# Patient Record
Sex: MALE | Race: WHITE | NOT HISPANIC OR LATINO
[De-identification: names, ages, dates, MRNs, and addresses within clinical notes are randomized per-mention and may not be internally consistent; named-entity substitution may affect disease eponyms.]

---

## 2018-07-16 ENCOUNTER — RESULT REVIEW (OUTPATIENT)
Age: 78
End: 2018-07-16

## 2022-11-01 ENCOUNTER — INPATIENT (INPATIENT)
Facility: HOSPITAL | Age: 82
LOS: 5 days | Discharge: HOME HEALTH | DRG: 57 | End: 2022-11-07
Attending: GENERAL ACUTE CARE HOSPITAL | Admitting: GENERAL ACUTE CARE HOSPITAL
Payer: COMMERCIAL

## 2022-11-01 VITALS
OXYGEN SATURATION: 96 % | TEMPERATURE: 98 F | WEIGHT: 175.05 LBS | HEIGHT: 67 IN | SYSTOLIC BLOOD PRESSURE: 121 MMHG | HEART RATE: 74 BPM | RESPIRATION RATE: 18 BRPM | DIASTOLIC BLOOD PRESSURE: 75 MMHG

## 2022-11-01 DIAGNOSIS — Z98.890 OTHER SPECIFIED POSTPROCEDURAL STATES: Chronic | ICD-10-CM

## 2022-11-01 DIAGNOSIS — R13.10 DYSPHAGIA, UNSPECIFIED: ICD-10-CM

## 2022-11-01 LAB
ALBUMIN SERPL ELPH-MCNC: 2.8 G/DL — LOW (ref 3.3–5)
ALP SERPL-CCNC: 97 U/L — SIGNIFICANT CHANGE UP (ref 40–120)
ALT FLD-CCNC: 21 U/L — SIGNIFICANT CHANGE UP (ref 10–45)
ANION GAP SERPL CALC-SCNC: 7 MMOL/L — SIGNIFICANT CHANGE UP (ref 5–17)
AST SERPL-CCNC: 31 U/L — SIGNIFICANT CHANGE UP (ref 10–40)
BASE EXCESS BLDV CALC-SCNC: 6.9 MMOL/L — HIGH (ref -2–3)
BASOPHILS # BLD AUTO: 0.04 K/UL — SIGNIFICANT CHANGE UP (ref 0–0.2)
BASOPHILS NFR BLD AUTO: 0.8 % — SIGNIFICANT CHANGE UP (ref 0–2)
BILIRUB SERPL-MCNC: 1.3 MG/DL — HIGH (ref 0.2–1.2)
BUN SERPL-MCNC: 11 MG/DL — SIGNIFICANT CHANGE UP (ref 7–23)
CA-I SERPL-SCNC: 1.17 MMOL/L — SIGNIFICANT CHANGE UP (ref 1.15–1.33)
CALCIUM SERPL-MCNC: 8.3 MG/DL — LOW (ref 8.4–10.5)
CHLORIDE BLDV-SCNC: 105 MMOL/L — SIGNIFICANT CHANGE UP (ref 96–108)
CHLORIDE SERPL-SCNC: 106 MMOL/L — SIGNIFICANT CHANGE UP (ref 96–108)
CO2 BLDV-SCNC: 35 MMOL/L — HIGH (ref 22–26)
CO2 SERPL-SCNC: 30 MMOL/L — SIGNIFICANT CHANGE UP (ref 22–31)
CREAT SERPL-MCNC: 1.01 MG/DL — SIGNIFICANT CHANGE UP (ref 0.5–1.3)
EGFR: 73 ML/MIN/1.73M2 — SIGNIFICANT CHANGE UP
EOSINOPHIL # BLD AUTO: 0.1 K/UL — SIGNIFICANT CHANGE UP (ref 0–0.5)
EOSINOPHIL NFR BLD AUTO: 1.9 % — SIGNIFICANT CHANGE UP (ref 0–6)
FLUAV AG NPH QL: SIGNIFICANT CHANGE UP
FLUBV AG NPH QL: SIGNIFICANT CHANGE UP
GAS PNL BLDV: 141 MMOL/L — SIGNIFICANT CHANGE UP (ref 136–145)
GAS PNL BLDV: SIGNIFICANT CHANGE UP
GLUCOSE BLDV-MCNC: 99 MG/DL — SIGNIFICANT CHANGE UP (ref 70–99)
GLUCOSE SERPL-MCNC: 108 MG/DL — HIGH (ref 70–99)
HCO3 BLDV-SCNC: 33 MMOL/L — HIGH (ref 22–29)
HCT VFR BLD CALC: 38.2 % — LOW (ref 39–50)
HCT VFR BLDA CALC: 39 % — SIGNIFICANT CHANGE UP (ref 39–51)
HGB BLD CALC-MCNC: 13.1 G/DL — SIGNIFICANT CHANGE UP (ref 12.6–17.4)
HGB BLD-MCNC: 12.9 G/DL — LOW (ref 13–17)
IMM GRANULOCYTES NFR BLD AUTO: 0.2 % — SIGNIFICANT CHANGE UP (ref 0–0.9)
LACTATE BLDV-MCNC: 1.6 MMOL/L — SIGNIFICANT CHANGE UP (ref 0.5–2)
LYMPHOCYTES # BLD AUTO: 1.04 K/UL — SIGNIFICANT CHANGE UP (ref 1–3.3)
LYMPHOCYTES # BLD AUTO: 20.3 % — SIGNIFICANT CHANGE UP (ref 13–44)
MAGNESIUM SERPL-MCNC: 2 MG/DL — SIGNIFICANT CHANGE UP (ref 1.6–2.6)
MCHC RBC-ENTMCNC: 31.5 PG — SIGNIFICANT CHANGE UP (ref 27–34)
MCHC RBC-ENTMCNC: 33.8 GM/DL — SIGNIFICANT CHANGE UP (ref 32–36)
MCV RBC AUTO: 93.2 FL — SIGNIFICANT CHANGE UP (ref 80–100)
MONOCYTES # BLD AUTO: 0.45 K/UL — SIGNIFICANT CHANGE UP (ref 0–0.9)
MONOCYTES NFR BLD AUTO: 8.8 % — SIGNIFICANT CHANGE UP (ref 2–14)
NEUTROPHILS # BLD AUTO: 3.49 K/UL — SIGNIFICANT CHANGE UP (ref 1.8–7.4)
NEUTROPHILS NFR BLD AUTO: 68 % — SIGNIFICANT CHANGE UP (ref 43–77)
NRBC # BLD: 0 /100 WBCS — SIGNIFICANT CHANGE UP (ref 0–0)
NT-PROBNP SERPL-SCNC: 424 PG/ML — HIGH (ref 0–300)
PCO2 BLDV: 52 MMHG — SIGNIFICANT CHANGE UP (ref 42–55)
PH BLDV: 7.41 — SIGNIFICANT CHANGE UP (ref 7.32–7.43)
PLATELET # BLD AUTO: 138 K/UL — LOW (ref 150–400)
PO2 BLDV: 28 MMHG — SIGNIFICANT CHANGE UP (ref 25–45)
POTASSIUM BLDV-SCNC: 2.6 MMOL/L — CRITICAL LOW (ref 3.5–5.1)
POTASSIUM SERPL-MCNC: 2.9 MMOL/L — CRITICAL LOW (ref 3.5–5.3)
POTASSIUM SERPL-SCNC: 2.9 MMOL/L — CRITICAL LOW (ref 3.5–5.3)
PROT SERPL-MCNC: 6.4 G/DL — SIGNIFICANT CHANGE UP (ref 6–8.3)
RBC # BLD: 4.1 M/UL — LOW (ref 4.2–5.8)
RBC # FLD: 15.7 % — HIGH (ref 10.3–14.5)
RSV RNA NPH QL NAA+NON-PROBE: SIGNIFICANT CHANGE UP
SAO2 % BLDV: 45.1 % — LOW (ref 67–88)
SARS-COV-2 RNA SPEC QL NAA+PROBE: SIGNIFICANT CHANGE UP
SODIUM SERPL-SCNC: 143 MMOL/L — SIGNIFICANT CHANGE UP (ref 135–145)
TROPONIN T, HIGH SENSITIVITY RESULT: 30 NG/L — SIGNIFICANT CHANGE UP (ref 0–51)
TROPONIN T, HIGH SENSITIVITY RESULT: 32 NG/L — SIGNIFICANT CHANGE UP (ref 0–51)
TSH SERPL-MCNC: 0.68 UIU/ML — SIGNIFICANT CHANGE UP (ref 0.27–4.2)
WBC # BLD: 5.13 K/UL — SIGNIFICANT CHANGE UP (ref 3.8–10.5)
WBC # FLD AUTO: 5.13 K/UL — SIGNIFICANT CHANGE UP (ref 3.8–10.5)

## 2022-11-01 PROCEDURE — 70450 CT HEAD/BRAIN W/O DYE: CPT | Mod: 26

## 2022-11-01 PROCEDURE — 99285 EMERGENCY DEPT VISIT HI MDM: CPT

## 2022-11-01 PROCEDURE — 71250 CT THORAX DX C-: CPT | Mod: 26,MA

## 2022-11-01 PROCEDURE — 71045 X-RAY EXAM CHEST 1 VIEW: CPT | Mod: 26

## 2022-11-01 PROCEDURE — 93010 ELECTROCARDIOGRAM REPORT: CPT

## 2022-11-01 RX ORDER — ATORVASTATIN CALCIUM 80 MG/1
40 TABLET, FILM COATED ORAL AT BEDTIME
Refills: 0 | Status: DISCONTINUED | OUTPATIENT
Start: 2022-11-01 | End: 2022-11-07

## 2022-11-01 RX ORDER — FLUDROCORTISONE ACETATE 0.1 MG/1
0.1 TABLET ORAL DAILY
Refills: 0 | Status: DISCONTINUED | OUTPATIENT
Start: 2022-11-01 | End: 2022-11-07

## 2022-11-01 RX ORDER — FUROSEMIDE 40 MG
40 TABLET ORAL DAILY
Refills: 0 | Status: DISCONTINUED | OUTPATIENT
Start: 2022-11-01 | End: 2022-11-03

## 2022-11-01 RX ORDER — POTASSIUM CHLORIDE 20 MEQ
10 PACKET (EA) ORAL
Refills: 0 | Status: COMPLETED | OUTPATIENT
Start: 2022-11-01 | End: 2022-11-01

## 2022-11-01 RX ORDER — MIDODRINE HYDROCHLORIDE 2.5 MG/1
5 TABLET ORAL THREE TIMES A DAY
Refills: 0 | Status: DISCONTINUED | OUTPATIENT
Start: 2022-11-01 | End: 2022-11-03

## 2022-11-01 RX ORDER — POTASSIUM CHLORIDE 20 MEQ
40 PACKET (EA) ORAL ONCE
Refills: 0 | Status: COMPLETED | OUTPATIENT
Start: 2022-11-01 | End: 2022-11-01

## 2022-11-01 RX ORDER — HEPARIN SODIUM 5000 [USP'U]/ML
5000 INJECTION INTRAVENOUS; SUBCUTANEOUS EVERY 8 HOURS
Refills: 0 | Status: DISCONTINUED | OUTPATIENT
Start: 2022-11-01 | End: 2022-11-07

## 2022-11-01 RX ORDER — ASPIRIN/CALCIUM CARB/MAGNESIUM 324 MG
81 TABLET ORAL DAILY
Refills: 0 | Status: DISCONTINUED | OUTPATIENT
Start: 2022-11-01 | End: 2022-11-07

## 2022-11-01 RX ORDER — DORZOLAMIDE HYDROCHLORIDE 20 MG/ML
1 SOLUTION/ DROPS OPHTHALMIC THREE TIMES A DAY
Refills: 0 | Status: DISCONTINUED | OUTPATIENT
Start: 2022-11-01 | End: 2022-11-07

## 2022-11-01 RX ORDER — LATANOPROST 0.05 MG/ML
1 SOLUTION/ DROPS OPHTHALMIC; TOPICAL AT BEDTIME
Refills: 0 | Status: DISCONTINUED | OUTPATIENT
Start: 2022-11-01 | End: 2022-11-07

## 2022-11-01 RX ADMIN — Medication 100 MILLIEQUIVALENT(S): at 15:03

## 2022-11-01 RX ADMIN — Medication 100 MILLIEQUIVALENT(S): at 17:26

## 2022-11-01 RX ADMIN — Medication 100 MILLIEQUIVALENT(S): at 16:09

## 2022-11-01 RX ADMIN — ATORVASTATIN CALCIUM 40 MILLIGRAM(S): 80 TABLET, FILM COATED ORAL at 22:10

## 2022-11-01 RX ADMIN — Medication 40 MILLIGRAM(S): at 22:11

## 2022-11-01 RX ADMIN — Medication 40 MILLIEQUIVALENT(S): at 20:13

## 2022-11-01 RX ADMIN — Medication 40 MILLIEQUIVALENT(S): at 15:03

## 2022-11-01 RX ADMIN — HEPARIN SODIUM 5000 UNIT(S): 5000 INJECTION INTRAVENOUS; SUBCUTANEOUS at 22:10

## 2022-11-01 NOTE — ED ADULT TRIAGE NOTE - CHIEF COMPLAINT QUOTE
Pt presents to ED with c/o slurred speech, uneasiness, blurred vision x months.  Pt had recent MRI showed history of TIA's    Pt has been seem by outpatient neuro and pt's daughter would like him to be evaluated.  PT takes daily 81 mg

## 2022-11-01 NOTE — ED PROVIDER NOTE - ATTENDING CONTRIBUTION TO CARE
attending Desiree: 82yM h/o HTN, HLD p/w worsening slurred speech, difficulty swallowing, orthopnea over the past month. Pt had recent outpatient MRI showed history of TIA 1 month ago, supposed to be adhering to thickened liquids diet but does not and daughter reports coughing fits after eating. Also with worsening LE edema b/l and orthopnea. Exam as above. Will obtain ekg, labs, review outpatient MRI, cxr eval for aspiration PNA, pt will require admission

## 2022-11-01 NOTE — H&P ADULT - HISTORY OF PRESENT ILLNESS
83yo M pmhx of HTN, HLD comes to ED w/ slurred speech, difficulty swallowing, orthopnea worsening over the past month. Pt had recent MRI showed history of TIA 1 month prior and was seen by outpatient neurologist Dr. Navya Roblero. Pt's daughter wanted him to be further evaluated due to the worsening symptoms prompting the visit to the ED today. Their symptoms are moderate, constant, non mediating with rest, associated with difficulty swallowing, slurred speech, attention deficit, cognitive slowing, anxiety, blurred vision. Denies chest pain, SOB, fall, trauma, n/v/d, sick contacts, urinary symptoms, stool symptoms. Patient denies hx of CHF however takes lasix regularly for peripheral edema 83yo M pmhx of HTN, HLD comes to ED w/ slurred speech, difficulty swallowing,  worsening over the past month.   overall sx started one month ago and pt was seen by neuro Dr. Navya Roblero..MRI per daughter showed "multiple strokes "   he was seen and worked up by cardio inclduing echo and 24 hr holter but jarquin "was negative "    Denies chest pain, SOB, fall, trauma, n/v/d, sick contacts, urinary symptoms.  reports cough to solids   has difficulty swallowing but Patient denies hx of CHF however takes lasix regularly for peripheral edema?

## 2022-11-01 NOTE — ED PROVIDER NOTE - OBJECTIVE STATEMENT
83yo M pmhx of HTN, HLD comes to ED w/ slurred speech, difficulty swallowing, orthopnea worsening over the past month. Pt had recent MRI showed history of TIA 1 month prior and was seen by outpatient neurologist Dr. Navya Roblero. Pt's daughter wanted him to be further evaluated due to the worsening symptoms prompting the visit to the ED today. Their symptoms are moderate, constant, non mediating with rest, associated with difficulty swallowing, slurred speech, attention deficit, cognitive slowing, anxiety, blurred vision. Denies chest pain, SOB, fall, trauma, n/v/d, sick contacts, urinary symptoms, stool symptoms. Patient denies hx of CHF however takes lasix regularly for peripheral edema.

## 2022-11-01 NOTE — ED PROVIDER NOTE - CLINICAL SUMMARY MEDICAL DECISION MAKING FREE TEXT BOX
Impression: 83yo M pmhx of HTN, HLD comes to ED w/ slurred speech, difficulty swallowing, orthopnea worsening over the past month. Their symptoms of slurred speech, difficulty swallowing, orthopnea and exam findings of b/l lower extremity swelling +1 pitting edema in the setting of recent TIA are concerning for worsening dysarthria from TIA sequela, CHF exacerbation.     Ordered labs, imaging, medications for diagnosis, management, and treatment.

## 2022-11-01 NOTE — ED ADULT NURSE REASSESSMENT NOTE - NS ED NURSE REASSESS COMMENT FT1
Pt A+Ox3, medication infusing, resting comfortably in stretcher. Pt and family at bedside aware of plan of care. Admitted to telemetry for difficulty swallowing and orthopnea; pending admission bed.

## 2022-11-01 NOTE — ED ADULT NURSE NOTE - NSIMPLEMENTINTERV_GEN_ALL_ED
Implemented All Fall Risk Interventions:  [place] to call system. Call bell, personal items and telephone within reach. Instruct patient to call for assistance. Room bathroom lighting operational. Non-slip footwear when patient is off stretcher. Physically safe environment: no spills, clutter or unnecessary equipment. Stretcher in lowest position, wheels locked, appropriate side rails in place. Provide visual cue, wrist band, yellow gown, etc. Monitor gait and stability. Monitor for mental status changes and reorient to person, place, and time. Review medications for side effects contributing to fall risk. Reinforce activity limits and safety measures with patient and family.

## 2022-11-01 NOTE — ED PROVIDER NOTE - NS ED ROS FT
Constitutional: no fevers, chills  HEENT: no cough, rhinorrhea  Cardiac: no chest pain, palpitations  Respiratory: no SOB  GI: no n/v, abd pain, bloody or dark stools  : no dysuria, frequency, or hematuria  MSK: no joint pain  Skin: no rashes  Neuro: difficulty swallowing, slurred speech. no headache, change in vision, focal weakness  Psych: negative

## 2022-11-01 NOTE — ED PROVIDER NOTE - PHYSICAL EXAMINATION
General: non-toxic, NAD  HEENT: NCAT, PERRL  Cardiac: RRR, no murmurs, 2+ peripheral pulses  Chest: CTAB  Abdomen: soft, non-distended, bowel sounds present, no ttp, no rebound or guarding  Extremities: b/l lower extremity swelling +1 pitting edema. no calf tenderness, or leg size discrepancies  Skin: no rashes  Neuro: AAOx4, 5+motor, sensory grossly intact  Psych: mood and affect appropriate

## 2022-11-02 DIAGNOSIS — Z86.79 PERSONAL HISTORY OF OTHER DISEASES OF THE CIRCULATORY SYSTEM: ICD-10-CM

## 2022-11-02 DIAGNOSIS — R53.1 WEAKNESS: ICD-10-CM

## 2022-11-02 DIAGNOSIS — I63.9 CEREBRAL INFARCTION, UNSPECIFIED: ICD-10-CM

## 2022-11-02 LAB
ALBUMIN SERPL ELPH-MCNC: 2.7 G/DL — LOW (ref 3.3–5)
ALP SERPL-CCNC: 88 U/L — SIGNIFICANT CHANGE UP (ref 40–120)
ALT FLD-CCNC: 21 U/L — SIGNIFICANT CHANGE UP (ref 10–45)
ANION GAP SERPL CALC-SCNC: 8 MMOL/L — SIGNIFICANT CHANGE UP (ref 5–17)
ANION GAP SERPL CALC-SCNC: 8 MMOL/L — SIGNIFICANT CHANGE UP (ref 5–17)
AST SERPL-CCNC: 28 U/L — SIGNIFICANT CHANGE UP (ref 10–40)
BASOPHILS # BLD AUTO: 0.04 K/UL — SIGNIFICANT CHANGE UP (ref 0–0.2)
BASOPHILS NFR BLD AUTO: 0.8 % — SIGNIFICANT CHANGE UP (ref 0–2)
BILIRUB SERPL-MCNC: 1.9 MG/DL — HIGH (ref 0.2–1.2)
BUN SERPL-MCNC: 9 MG/DL — SIGNIFICANT CHANGE UP (ref 7–23)
BUN SERPL-MCNC: 9 MG/DL — SIGNIFICANT CHANGE UP (ref 7–23)
CALCIUM SERPL-MCNC: 8.3 MG/DL — LOW (ref 8.4–10.5)
CALCIUM SERPL-MCNC: 8.3 MG/DL — LOW (ref 8.4–10.5)
CHLORIDE SERPL-SCNC: 106 MMOL/L — SIGNIFICANT CHANGE UP (ref 96–108)
CHLORIDE SERPL-SCNC: 107 MMOL/L — SIGNIFICANT CHANGE UP (ref 96–108)
CO2 SERPL-SCNC: 29 MMOL/L — SIGNIFICANT CHANGE UP (ref 22–31)
CO2 SERPL-SCNC: 30 MMOL/L — SIGNIFICANT CHANGE UP (ref 22–31)
CREAT SERPL-MCNC: 0.9 MG/DL — SIGNIFICANT CHANGE UP (ref 0.5–1.3)
CREAT SERPL-MCNC: 0.95 MG/DL — SIGNIFICANT CHANGE UP (ref 0.5–1.3)
EGFR: 79 ML/MIN/1.73M2 — SIGNIFICANT CHANGE UP
EGFR: 84 ML/MIN/1.73M2 — SIGNIFICANT CHANGE UP
EOSINOPHIL # BLD AUTO: 0.25 K/UL — SIGNIFICANT CHANGE UP (ref 0–0.5)
EOSINOPHIL NFR BLD AUTO: 5.1 % — SIGNIFICANT CHANGE UP (ref 0–6)
GLUCOSE SERPL-MCNC: 80 MG/DL — SIGNIFICANT CHANGE UP (ref 70–99)
GLUCOSE SERPL-MCNC: 87 MG/DL — SIGNIFICANT CHANGE UP (ref 70–99)
HCT VFR BLD CALC: 38.8 % — LOW (ref 39–50)
HGB BLD-MCNC: 12.8 G/DL — LOW (ref 13–17)
IMM GRANULOCYTES NFR BLD AUTO: 0.2 % — SIGNIFICANT CHANGE UP (ref 0–0.9)
LYMPHOCYTES # BLD AUTO: 1.31 K/UL — SIGNIFICANT CHANGE UP (ref 1–3.3)
LYMPHOCYTES # BLD AUTO: 26.7 % — SIGNIFICANT CHANGE UP (ref 13–44)
MAGNESIUM SERPL-MCNC: 1.9 MG/DL — SIGNIFICANT CHANGE UP (ref 1.6–2.6)
MAGNESIUM SERPL-MCNC: 1.9 MG/DL — SIGNIFICANT CHANGE UP (ref 1.6–2.6)
MCHC RBC-ENTMCNC: 31.4 PG — SIGNIFICANT CHANGE UP (ref 27–34)
MCHC RBC-ENTMCNC: 33 GM/DL — SIGNIFICANT CHANGE UP (ref 32–36)
MCV RBC AUTO: 95.1 FL — SIGNIFICANT CHANGE UP (ref 80–100)
MONOCYTES # BLD AUTO: 0.54 K/UL — SIGNIFICANT CHANGE UP (ref 0–0.9)
MONOCYTES NFR BLD AUTO: 11 % — SIGNIFICANT CHANGE UP (ref 2–14)
MRSA PCR RESULT.: SIGNIFICANT CHANGE UP
NEUTROPHILS # BLD AUTO: 2.75 K/UL — SIGNIFICANT CHANGE UP (ref 1.8–7.4)
NEUTROPHILS NFR BLD AUTO: 56.2 % — SIGNIFICANT CHANGE UP (ref 43–77)
NRBC # BLD: 0 /100 WBCS — SIGNIFICANT CHANGE UP (ref 0–0)
PHOSPHATE SERPL-MCNC: 1.9 MG/DL — LOW (ref 2.5–4.5)
PLATELET # BLD AUTO: 120 K/UL — LOW (ref 150–400)
POTASSIUM SERPL-MCNC: 3 MMOL/L — LOW (ref 3.5–5.3)
POTASSIUM SERPL-MCNC: 3.4 MMOL/L — LOW (ref 3.5–5.3)
POTASSIUM SERPL-SCNC: 3 MMOL/L — LOW (ref 3.5–5.3)
POTASSIUM SERPL-SCNC: 3.4 MMOL/L — LOW (ref 3.5–5.3)
PROT SERPL-MCNC: 6.4 G/DL — SIGNIFICANT CHANGE UP (ref 6–8.3)
RBC # BLD: 4.08 M/UL — LOW (ref 4.2–5.8)
RBC # FLD: 15.3 % — HIGH (ref 10.3–14.5)
S AUREUS DNA NOSE QL NAA+PROBE: DETECTED
SODIUM SERPL-SCNC: 143 MMOL/L — SIGNIFICANT CHANGE UP (ref 135–145)
SODIUM SERPL-SCNC: 145 MMOL/L — SIGNIFICANT CHANGE UP (ref 135–145)
WBC # BLD: 4.9 K/UL — SIGNIFICANT CHANGE UP (ref 3.8–10.5)
WBC # FLD AUTO: 4.9 K/UL — SIGNIFICANT CHANGE UP (ref 3.8–10.5)

## 2022-11-02 PROCEDURE — 76705 ECHO EXAM OF ABDOMEN: CPT | Mod: 26

## 2022-11-02 PROCEDURE — 93306 TTE W/DOPPLER COMPLETE: CPT | Mod: 26

## 2022-11-02 RX ORDER — LANOLIN ALCOHOL/MO/W.PET/CERES
3 CREAM (GRAM) TOPICAL ONCE
Refills: 0 | Status: COMPLETED | OUTPATIENT
Start: 2022-11-02 | End: 2022-11-02

## 2022-11-02 RX ORDER — THIAMINE MONONITRATE (VIT B1) 100 MG
100 TABLET ORAL DAILY
Refills: 0 | Status: DISCONTINUED | OUTPATIENT
Start: 2022-11-02 | End: 2022-11-07

## 2022-11-02 RX ORDER — POTASSIUM CHLORIDE 20 MEQ
10 PACKET (EA) ORAL
Refills: 0 | Status: COMPLETED | OUTPATIENT
Start: 2022-11-02 | End: 2022-11-02

## 2022-11-02 RX ORDER — POTASSIUM PHOSPHATE, MONOBASIC POTASSIUM PHOSPHATE, DIBASIC 236; 224 MG/ML; MG/ML
30 INJECTION, SOLUTION INTRAVENOUS EVERY 6 HOURS
Refills: 0 | Status: DISCONTINUED | OUTPATIENT
Start: 2022-11-02 | End: 2022-11-02

## 2022-11-02 RX ORDER — CHLORHEXIDINE GLUCONATE 213 G/1000ML
1 SOLUTION TOPICAL
Refills: 0 | Status: DISCONTINUED | OUTPATIENT
Start: 2022-11-02 | End: 2022-11-07

## 2022-11-02 RX ORDER — POTASSIUM PHOSPHATE, MONOBASIC POTASSIUM PHOSPHATE, DIBASIC 236; 224 MG/ML; MG/ML
30 INJECTION, SOLUTION INTRAVENOUS ONCE
Refills: 0 | Status: COMPLETED | OUTPATIENT
Start: 2022-11-02 | End: 2022-11-02

## 2022-11-02 RX ORDER — FOLIC ACID 0.8 MG
1 TABLET ORAL DAILY
Refills: 0 | Status: DISCONTINUED | OUTPATIENT
Start: 2022-11-02 | End: 2022-11-07

## 2022-11-02 RX ORDER — POTASSIUM CHLORIDE 20 MEQ
40 PACKET (EA) ORAL ONCE
Refills: 0 | Status: COMPLETED | OUTPATIENT
Start: 2022-11-02 | End: 2022-11-02

## 2022-11-02 RX ORDER — POTASSIUM PHOSPHATE, MONOBASIC POTASSIUM PHOSPHATE, DIBASIC 236; 224 MG/ML; MG/ML
30 INJECTION, SOLUTION INTRAVENOUS EVERY 6 HOURS
Refills: 0 | Status: COMPLETED | OUTPATIENT
Start: 2022-11-02 | End: 2022-11-03

## 2022-11-02 RX ADMIN — Medication 81 MILLIGRAM(S): at 12:14

## 2022-11-02 RX ADMIN — Medication 100 MILLIEQUIVALENT(S): at 08:27

## 2022-11-02 RX ADMIN — HEPARIN SODIUM 5000 UNIT(S): 5000 INJECTION INTRAVENOUS; SUBCUTANEOUS at 21:48

## 2022-11-02 RX ADMIN — POTASSIUM PHOSPHATE, MONOBASIC POTASSIUM PHOSPHATE, DIBASIC 83.33 MILLIMOLE(S): 236; 224 INJECTION, SOLUTION INTRAVENOUS at 12:15

## 2022-11-02 RX ADMIN — HEPARIN SODIUM 5000 UNIT(S): 5000 INJECTION INTRAVENOUS; SUBCUTANEOUS at 05:56

## 2022-11-02 RX ADMIN — DORZOLAMIDE HYDROCHLORIDE 1 DROP(S): 20 SOLUTION/ DROPS OPHTHALMIC at 05:55

## 2022-11-02 RX ADMIN — DORZOLAMIDE HYDROCHLORIDE 1 DROP(S): 20 SOLUTION/ DROPS OPHTHALMIC at 17:57

## 2022-11-02 RX ADMIN — DORZOLAMIDE HYDROCHLORIDE 1 DROP(S): 20 SOLUTION/ DROPS OPHTHALMIC at 21:48

## 2022-11-02 RX ADMIN — Medication 40 MILLIEQUIVALENT(S): at 12:14

## 2022-11-02 RX ADMIN — LATANOPROST 1 DROP(S): 0.05 SOLUTION/ DROPS OPHTHALMIC; TOPICAL at 22:10

## 2022-11-02 RX ADMIN — Medication 100 MILLIEQUIVALENT(S): at 03:54

## 2022-11-02 RX ADMIN — ATORVASTATIN CALCIUM 40 MILLIGRAM(S): 80 TABLET, FILM COATED ORAL at 21:48

## 2022-11-02 RX ADMIN — Medication 40 MILLIGRAM(S): at 05:56

## 2022-11-02 RX ADMIN — FLUDROCORTISONE ACETATE 0.1 MILLIGRAM(S): 0.1 TABLET ORAL at 05:56

## 2022-11-02 RX ADMIN — Medication 3 MILLIGRAM(S): at 22:10

## 2022-11-02 RX ADMIN — Medication 100 MILLIEQUIVALENT(S): at 01:50

## 2022-11-02 NOTE — CONSULT NOTE ADULT - PROBLEM SELECTOR RECOMMENDATION 2
+ orthostatics  c/w midodrine as ordered   continue to monitor + orthostatics  c/w midodrine and florinef as ordered   continue to monitor

## 2022-11-02 NOTE — PHYSICAL THERAPY INITIAL EVALUATION ADULT - GENERAL OBSERVATIONS, REHAB EVAL
Pt rec'd semisupine in bed, in NAD, +tele, +IV, dtr at bedside. Agreeable to PT. RN cleared pt to be seen.

## 2022-11-02 NOTE — PATIENT PROFILE ADULT - FALL HARM RISK - HARM RISK INTERVENTIONS
Assistance with ambulation/Assistance OOB with selected safe patient handling equipment/Communicate Risk of Fall with Harm to all staff/Discuss with provider need for PT consult/Monitor gait and stability/Provide patient with walking aids - walker, cane, crutches/Reinforce activity limits and safety measures with patient and family/Sit up slowly, dangle for a short time, stand at bedside before walking/Tailored Fall Risk Interventions/Visual Cue: Yellow wristband and red socks/Bed in lowest position, wheels locked, appropriate side rails in place/Call bell, personal items and telephone in reach/Instruct patient to call for assistance before getting out of bed or chair/Non-slip footwear when patient is out of bed/[place] to call system/Physically safe environment - no spills, clutter or unnecessary equipment/Purposeful Proactive Rounding/Room/bathroom lighting operational, light cord in reach

## 2022-11-02 NOTE — PROGRESS NOTE ADULT - SUBJECTIVE AND OBJECTIVE BOX
Date of service: 22 @ 19:44      Patient is a 82y old  Male who presents with a chief complaint of                                                              INTERVAL HPI/OVERNIGHT EVENTS:    REVIEW OF SYSTEMS:     CONSTITUTIONAL: No weakness, fevers or chills  EYES/ENT: No visual changes , no ear ache   NECK: No pain or stiffness  RESPIRATORY: No cough, wheezing,  No shortness of breath  CARDIOVASCULAR: No chest pain or palpitations  GASTROINTESTINAL: No abdominal pain  . No nausea, vomiting, or hematemesis; No diarrhea or constipation. No melena or hematochezia.  GENITOURINARY: No dysuria, frequency or hematuria  NEUROLOGICAL: No numbness or weakness  SKIN: No itching, burning, rashes, or lesions                                                                                                                                                                                                                                                                                 Medications:  MEDICATIONS  (STANDING):  aspirin enteric coated 81 milliGRAM(s) Oral daily  atorvastatin 40 milliGRAM(s) Oral at bedtime  chlorhexidine 2% Cloths 1 Application(s) Topical <User Schedule>  dorzolamide 2% Ophthalmic Solution 1 Drop(s) Both EYES three times a day  fludroCORTISONE 0.1 milliGRAM(s) Oral daily  furosemide   Injectable 40 milliGRAM(s) IV Push daily  heparin   Injectable 5000 Unit(s) SubCutaneous every 8 hours  latanoprost 0.005% Ophthalmic Solution 1 Drop(s) Both EYES at bedtime  midodrine. 5 milliGRAM(s) Oral three times a day    MEDICATIONS  (PRN):       Allergies    No Known Allergies    Intolerances      Vital Signs Last 24 Hrs  T(C): 36.6 (2022 11:13), Max: 36.9 (2022 21:31)  T(F): 97.9 (2022 11:13), Max: 98.4 (2022 21:31)  HR: 70 (2022 16:15) (62 - 75)  BP: 132/73 (2022 18:11) (125/68 - 190/81)  BP(mean): --  RR: 18 (2022 16:15) (16 - 18)  SpO2: 97% (2022 16:15) (94% - 97%)    Parameters below as of 2022 16:15  Patient On (Oxygen Delivery Method): room air      CAPILLARY BLOOD GLUCOSE           @ 07:01  -   @ 07:00  --------------------------------------------------------  IN: 0 mL / OUT: 2200 mL / NET: -2200 mL     @ 07:01  -   @ 19:44  --------------------------------------------------------  IN: 240 mL / OUT: 1250 mL / NET: -1010 mL      Physical Exam:    Daily     Daily Weight in k.3 (2022 08:32)  General: NAD   HEENT:  Nonicteric, PERRLA  CV:  RRR, S1S2   Lungs:  CTA B/L, no wheezes, rales, rhonchi  Abdomen:  Soft, non-tender, no distended, positive BS  Extremities: edema   Neuro:  AAOx3, non-focal, grossly intact                                                                                                                                                                                                                                                                                                LABS:                               12.8   4.90  )-----------( 120      ( 2022 06:44 )             38.8                          143  |  106  |  9   ----------------------------<  80  3.4<L>   |  29  |  0.95    Ca    8.3<L>      2022 06:44  Phos  1.9       Mg     1.9         TPro  6.4  /  Alb  2.7<L>  /  TBili  1.9<H>  /  DBili  x   /  AST  28  /  ALT  21  /  AlkPhos  88                         RADIOLOGY & ADDITIONAL TESTS         I personally reviewed: [  ]EKG   [  ]CXR    [  ] CT      A/P:         Discussed with :     [place] consultants' Notes   Time spent :

## 2022-11-02 NOTE — CONSULT NOTE ADULT - SUBJECTIVE AND OBJECTIVE BOX
CHIEF COMPLAINT:     HISTORY OF PRESENT ILLNESS:  81yo M pmhx of HTN, HLD comes to ED w/ slurred speech, difficulty swallowing, worsening over the past month.   overall sx started one month ago and pt was seen by neuro Dr. Navya Roblero.  MRI per daughter showed "multiple strokes "   he was seen and worked up by cardio including echo and 24 hr holter but jarquin "was negative "   Denies chest pain, SOB, fall, trauma, n/v/d, sick contacts, urinary symptoms.  reports cough to solids   has difficulty swallowing but Patient denies hx of CHF however takes lasix regularly for peripheral edema?    Cardiology consulted for cardiac management.  Received history as stated above from his daughter who is bedside.  Denies chest pain, SOB, palpitations.       PAST MEDICAL & SURGICAL HISTORY:  H/O orthostatic hypotension    CVA (cerebrovascular accident)    History of hip surgery    MEDICATIONS:  aspirin enteric coated 81 milliGRAM(s) Oral daily  furosemide   Injectable 40 milliGRAM(s) IV Push daily  heparin   Injectable 5000 Unit(s) SubCutaneous every 8 hours  midodrine. 5 milliGRAM(s) Oral three times a day    atorvastatin 40 milliGRAM(s) Oral at bedtime  fludroCORTISONE 0.1 milliGRAM(s) Oral daily    chlorhexidine 2% Cloths 1 Application(s) Topical <User Schedule>  dorzolamide 2% Ophthalmic Solution 1 Drop(s) Both EYES three times a day  latanoprost 0.005% Ophthalmic Solution 1 Drop(s) Both EYES at bedtime    FAMILY HISTORY:  No pertinent family history in first degree relatives    SOCIAL HISTORY:    [ ] Non-smoker  [ ] Smoker  [ ] Alcohol    Allergies    No Known Allergies    Intolerances    REVIEW OF SYSTEMS:  CONSTITUTIONAL: No fever, weight loss, + fatigue  EYES: No eye pain, visual disturbances, or discharge  ENMT:  No difficulty hearing, tinnitus, vertigo; No sinus or throat pain  NECK: No pain or stiffness  RESPIRATORY: No cough, wheezing, chills or hemoptysis; No Shortness of Breath  CARDIOVASCULAR: No chest pain, palpitations, passing out, dizziness, or leg swelling  GASTROINTESTINAL: No abdominal or epigastric pain. No nausea, vomiting, or hematemesis; No diarrhea or constipation. No melena or hematochezia.  GENITOURINARY: No dysuria, frequency, hematuria, or incontinence  NEUROLOGICAL: No headaches, memory loss, loss of strength, numbness, or tremors  SKIN: No itching, burning, rashes, or lesions   LYMPH Nodes: No enlarged glands  ENDOCRINE: No heat or cold intolerance; No hair loss  MUSCULOSKELETAL: No joint pain or swelling; No muscle, back, or extremity pain  PSYCHIATRIC: No depression, anxiety, mood swings, or difficulty sleeping  HEME/LYMPH: No easy bruising, or bleeding gums  ALLERY AND IMMUNOLOGIC: No hives or eczema	    [ ] All others negative	  [ ] Unable to obtain    PHYSICAL EXAM:  T(C): 36.6 (11-02-22 @ 05:42), Max: 36.9 (11-01-22 @ 21:31)  HR: 62 (11-02-22 @ 05:42) (62 - 72)  BP: 155/73 (11-02-22 @ 05:42) (125/68 - 197/84)  RR: 18 (11-02-22 @ 05:42) (16 - 18)  SpO2: 95% (11-02-22 @ 05:42) (95% - 98%)  Wt(kg): --  I&O's Summary    01 Nov 2022 07:01  -  02 Nov 2022 07:00  --------------------------------------------------------  IN: 0 mL / OUT: 2200 mL / NET: -2200 mL    02 Nov 2022 07:01  -  02 Nov 2022 13:08  --------------------------------------------------------  IN: 0 mL / OUT: 750 mL / NET: -750 mL    Appearance: NAD	  HEENT:  dry oral mucosa, PERRL, EOMI	  Lymphatic: No lymphadenopathy  Cardiovascular: Normal S1 S2, No JVD, No murmurs, No edema  Respiratory: Decreased BS  Psychiatry: A & O x 3, Mood & affect appropriate  Gastrointestinal: Soft, Non-tender, + BS	  Skin: No rashes, No ecchymoses, No cyanosis	  Neurologic: Non-focal  Extremities: Normal range of motion, No clubbing, cyanosis or edema  Vascular: Peripheral pulses palpable 2+ bilaterally    TELEMETRY: SR 	60-70  ECG:  < from: Transthoracic Echocardiogram (11.02.22 @ 06:26) >  Patient name: JE AKERS  YOB: 1940   Age: 82 (M)   MR#: [id number]  Study Date: 11/2/2022  [facility]imah Vas[facility] RDCS  Study quality: Technically fair  Referring Physician: Pablo Conde MD  Blood Pressure: 155/73 mmHg  Height: 170 cm  Weight: 79 kg  BSA: 1.9 m2  ------------------------------------------------------------------------  PROCEDURE: Transthoracic echocardiogram with 2-D, M-Mode  and complete spectral and color flow Doppler.  INDICATION: Abnormal electrocardiogram (ECG) (EKG) (R94.31)  ------------------------------------------------------------------------  Dimensions:    Normal Values:  LA:     3.9    2.0 - 4.0 cm  Ao:     3.7    2.0 - 3.8 cm  SEPTUM: 1.0    0.6 - 1.2 cm  PWT:0.8    0.6 - 1.1 cm  LVIDd:  3.9    3.0 - 5.6 cm  LVIDs:  2.2    1.8 - 4.0 cm  Derived variables:  LVMI: 55 g/m2  RWT: 0.41  Fractional short: 44 %  EF (Visual Estimate):  %  EF ([place] Rule): 78 %Doppler Peak Velocity (m/sec):  AoV=1.5  ------------------------------------------------------------------------  Observations:  Mitral Valve: Normal mitral valve. Minimal mitral  regurgitation.  Aortic Valve/Aorta: Calcified trileaflet aortic valve with  normal opening. Peak transaortic valve gradient equals 9 mm  Hg. No aortic valve regurgitation seen. Peak left  ventricular outflow tract gradient equals 7 mm Hg, mean  gradient is equal to 4 mm Hg, LVOT velocity time integral  equals 32 cm.  Aortic Root: 3.7 cm.  Ascending Aorta: 3.9 cm.  LVOT diameter: 2 cm.  Left Atrium: Normal left atrium.  LA volume index = 26  cc/m2.  Left Ventricle: Hyperdynamic left ventricular systolic  function. There is a false tendon in the LV cavity (normal  variant). Normal left ventricular internal dimensions and  wall thicknesses. Normal diastolic function.  Right Heart: Normal right atrium. Normal right ventricular  size and function. Normal tricuspid valve. Minimal  tricuspid regurgitation. Pulmonic valve not well  visualized, probably normal. No pulmonic regurgitation.  Pericardium/Pleura: Normal pericardium with no pericardial  effusion.  Hemodynamic: Estimated right atrial pressure equals 3 mmHg.  Inadequate tricuspid regurgitation Doppler envelope  precludes estimation of RVSP.  ------------------------------------------------------------------------  Conclusions:  1. Normal mitral valve. Minimal mitral regurgitation.  2. Calcified trileaflet aortic valve with normal opening.  No aortic valve regurgitation seen.  3. Hyperdynamic left ventricular systolic function. There  is a false tendon in the LV cavity (normal variant).  4. Normal right ventricular size and function.  *** No previous Echo exam.  ------------------------------------------------------------------------  Confirmed on  11/2/2022 - 10:07:37 by Anthony Mosqueda M.D.  ------------------------------------------------------------------------    < end of copied text >  	  RADIOLOGY: < from: CT Head No Cont (11.01.22 @ 18:40) >  ACC: [id number] EXAM:  CT BRAIN                          PROCEDURE DATE:  11/01/2022      INTERPRETATION:  CLINICAL INDICATION: Worsening dysphagia    TECHNIQUE: Noncontrast CT of the head was performed.    Multiple contiguous axial images were acquired from the skull base to the   vertex without the administration of intravenous contrast. Coronal and   sagittal reformations were made.    COMPARISON: None.    FINDINGS:  Mild prominence of the sulci and ventricles are consistent with   age-appropriate volume loss. There is no intraparenchymal hematoma, mass   effect or midline shift. The basal cisterns are patent.  No abnormal   extra-axial fluid collections are present.    Mild mucosal thickening of the bilateral ethmoid and sphenoid sinuses.   The mastoid air cells and middle ear cavities are clear. The intraorbital   compartments are unremarkable.    The soft tissues of the scalp are unremarkable. The calvarium is intact.      IMPRESSION:    No acute hemorrhage or mass effect.    --- End of Report ---    < end of copied text >  < from: CT Chest No Cont (11.01.22 @ 15:34) >  ACC: [id number] EXAM:  CT CHEST                          PROCEDURE DATE:  11/01/2022      INTERPRETATION:  CLINICAL INFORMATION: Worsening orthopnea and dysphagia.    COMPARISON: Chest x-ray 11/1/2022.    CONTRAST/COMPLICATIONS: None.    PROCEDURE:  CT scan of the chest was obtained without intravenous contrast.    FINDINGS:    LYMPH NODES/MEDIASTINUM: No lymphadenopathy. Lower paraesophageal varices.    HEART/VASCULATURE: Aortic and coronary artery calcifications. Enlarged   heart. No pericardial effusion.    AIRWAYS/LUNGS/PLEURA: Clear lungs. No pleural effusions or endobronchial   lesions. Mild air trapping.    UPPER ABDOMEN: Nodular shrunken liver, compatible with cirrhosis. Right   upper pole renal cyst measuring 4 cm. Small volume ascites. Surgical   clips in the right upper quadrant.    BONES/SOFT TISSUES: Degenerative changes.    IMPRESSION:    Clear lungs.    --- End of Report ---    < end of copied text >  < from: Xray Chest 1 View AP/PA (11.01.22 @ 13:30) >  ACC: [id number] EXAM:  XR CHEST AP OR PA 1V                          PROCEDURE DATE:  11/01/2022      INTERPRETATION:  CLINICAL INFORMATION: Chest Pain    EXAM: Frontal view of the chest.    COMPARISON: No similar prior studies available for comparison.    FINDINGS:      The heart size cannot be assessed on this projection.  The lungs are clear.  No pleural effusion or pneumothorax.    IMPRESSION:  Clear lungs.    --- End of Report ---    < end of copied text >    OTHER: 	  	  LABS:	 	    CARDIAC MARKERS: Troponin T, High Sensitivity Result: 30: Specimen not hemolyzed Troponin T, High Sensitivity Result: 32: Specimen not hemolyzed                         12.8   4.90  )-----------( 120      ( 02 Nov 2022 06:44 )             38.8     11-02    143  |  106  |  9   ----------------------------<  80  3.4<L>   |  29  |  0.95    Ca    8.3<L>      02 Nov 2022 06:44  Phos  1.9     11-02  Mg     1.9     11-02    TPro  6.4  /  Alb  2.7<L>  /  TBili  1.9<H>  /  DBili  x   /  AST  28  /  ALT  21  /  AlkPhos  88  11-02    proBNP: Serum Pro-Brain Natriuretic Peptide: 424 pg/mL (11-01 @ 13:47)    Lipid Profile:   HgA1c:   TSH: Thyroid Stimulating Hormone, Serum: 0.68 uIU/mL (11-01 @ 13:47)

## 2022-11-02 NOTE — PHYSICAL THERAPY INITIAL EVALUATION ADULT - ADDITIONAL COMMENTS
Per pt and dtr at bedside, pt lives in a house with wife. Has 5 steps to enter (+BHR) and bedroom/bathroom on main level. Reports being independent with functional mobility/ADLs using cane. Reports he owns RW as well. +drive locally. Reporting blurry vision, however pt also reports this is not new 2/2 he has glaucoma and uses reading and distances glasses.

## 2022-11-02 NOTE — PHYSICAL THERAPY INITIAL EVALUATION ADULT - PERTINENT HX OF CURRENT PROBLEM, REHAB EVAL
81yo M pmhx of HTN, HLD comes to ED w/ slurred speech, difficulty swallowing,  worsening over the past month. Overall, sx started one month ago and pt was seen by neuro Dr. Navya Roblero. MRI per daughter showed "multiple strokes". He was seen and worked up by cardio including echo and 24 hr holter but jarquin "was negative". Denies chest pain, SOB, fall, trauma, n/v/d, sick contacts, urinary symptoms. Reports cough to solids. Has difficulty swallowing but Patient denies hx of CHF however takes lasix regularly for peripheral edema? CXR/CT Chest: clear lungs, CT Head: negative. US Abdomen (RUQ): Heterogeneous liver with coarsened echotexture and nodular contour consistent with cirrhosis.

## 2022-11-03 LAB
A1C WITH ESTIMATED AVERAGE GLUCOSE RESULT: 5.3 % — SIGNIFICANT CHANGE UP (ref 4–5.6)
ANION GAP SERPL CALC-SCNC: 5 MMOL/L — SIGNIFICANT CHANGE UP (ref 5–17)
BUN SERPL-MCNC: 10 MG/DL — SIGNIFICANT CHANGE UP (ref 7–23)
CALCIUM SERPL-MCNC: 8.1 MG/DL — LOW (ref 8.4–10.5)
CHLORIDE SERPL-SCNC: 107 MMOL/L — SIGNIFICANT CHANGE UP (ref 96–108)
CHOLEST SERPL-MCNC: 106 MG/DL — SIGNIFICANT CHANGE UP
CO2 SERPL-SCNC: 31 MMOL/L — SIGNIFICANT CHANGE UP (ref 22–31)
CREAT SERPL-MCNC: 0.89 MG/DL — SIGNIFICANT CHANGE UP (ref 0.5–1.3)
EGFR: 84 ML/MIN/1.73M2 — SIGNIFICANT CHANGE UP
ESTIMATED AVERAGE GLUCOSE: 105 MG/DL — SIGNIFICANT CHANGE UP (ref 68–114)
FOLATE SERPL-MCNC: 6.6 NG/ML — SIGNIFICANT CHANGE UP
GLUCOSE SERPL-MCNC: 85 MG/DL — SIGNIFICANT CHANGE UP (ref 70–99)
HDLC SERPL-MCNC: 46 MG/DL — SIGNIFICANT CHANGE UP
LIPID PNL WITH DIRECT LDL SERPL: 50 MG/DL — SIGNIFICANT CHANGE UP
MAGNESIUM SERPL-MCNC: 1.9 MG/DL — SIGNIFICANT CHANGE UP (ref 1.6–2.6)
NON HDL CHOLESTEROL: 60 MG/DL — SIGNIFICANT CHANGE UP
PHOSPHATE SERPL-MCNC: 4.2 MG/DL — SIGNIFICANT CHANGE UP (ref 2.5–4.5)
POTASSIUM SERPL-MCNC: 3.6 MMOL/L — SIGNIFICANT CHANGE UP (ref 3.5–5.3)
POTASSIUM SERPL-SCNC: 3.6 MMOL/L — SIGNIFICANT CHANGE UP (ref 3.5–5.3)
SODIUM SERPL-SCNC: 143 MMOL/L — SIGNIFICANT CHANGE UP (ref 135–145)
TRIGL SERPL-MCNC: 51 MG/DL — SIGNIFICANT CHANGE UP
TSH SERPL-MCNC: 0.85 UIU/ML — SIGNIFICANT CHANGE UP (ref 0.27–4.2)
VIT B12 SERPL-MCNC: 677 PG/ML — SIGNIFICANT CHANGE UP (ref 232–1245)

## 2022-11-03 RX ORDER — MIDODRINE HYDROCHLORIDE 2.5 MG/1
5 TABLET ORAL THREE TIMES A DAY
Refills: 0 | Status: DISCONTINUED | OUTPATIENT
Start: 2022-11-03 | End: 2022-11-07

## 2022-11-03 RX ORDER — LANOLIN ALCOHOL/MO/W.PET/CERES
3 CREAM (GRAM) TOPICAL ONCE
Refills: 0 | Status: COMPLETED | OUTPATIENT
Start: 2022-11-03 | End: 2022-11-03

## 2022-11-03 RX ORDER — LISINOPRIL 2.5 MG/1
2.5 TABLET ORAL DAILY
Refills: 0 | Status: DISCONTINUED | OUTPATIENT
Start: 2022-11-03 | End: 2022-11-05

## 2022-11-03 RX ADMIN — Medication 1 TABLET(S): at 12:07

## 2022-11-03 RX ADMIN — POTASSIUM PHOSPHATE, MONOBASIC POTASSIUM PHOSPHATE, DIBASIC 83.33 MILLIMOLE(S): 236; 224 INJECTION, SOLUTION INTRAVENOUS at 06:56

## 2022-11-03 RX ADMIN — HEPARIN SODIUM 5000 UNIT(S): 5000 INJECTION INTRAVENOUS; SUBCUTANEOUS at 16:17

## 2022-11-03 RX ADMIN — MIDODRINE HYDROCHLORIDE 5 MILLIGRAM(S): 2.5 TABLET ORAL at 06:55

## 2022-11-03 RX ADMIN — DORZOLAMIDE HYDROCHLORIDE 1 DROP(S): 20 SOLUTION/ DROPS OPHTHALMIC at 06:55

## 2022-11-03 RX ADMIN — DORZOLAMIDE HYDROCHLORIDE 1 DROP(S): 20 SOLUTION/ DROPS OPHTHALMIC at 21:32

## 2022-11-03 RX ADMIN — DORZOLAMIDE HYDROCHLORIDE 1 DROP(S): 20 SOLUTION/ DROPS OPHTHALMIC at 16:17

## 2022-11-03 RX ADMIN — Medication 3 MILLIGRAM(S): at 21:38

## 2022-11-03 RX ADMIN — CHLORHEXIDINE GLUCONATE 1 APPLICATION(S): 213 SOLUTION TOPICAL at 06:56

## 2022-11-03 RX ADMIN — Medication 100 MILLIGRAM(S): at 12:07

## 2022-11-03 RX ADMIN — Medication 40 MILLIGRAM(S): at 06:55

## 2022-11-03 RX ADMIN — ATORVASTATIN CALCIUM 40 MILLIGRAM(S): 80 TABLET, FILM COATED ORAL at 21:31

## 2022-11-03 RX ADMIN — POTASSIUM PHOSPHATE, MONOBASIC POTASSIUM PHOSPHATE, DIBASIC 83.33 MILLIMOLE(S): 236; 224 INJECTION, SOLUTION INTRAVENOUS at 00:56

## 2022-11-03 RX ADMIN — HEPARIN SODIUM 5000 UNIT(S): 5000 INJECTION INTRAVENOUS; SUBCUTANEOUS at 06:55

## 2022-11-03 RX ADMIN — FLUDROCORTISONE ACETATE 0.1 MILLIGRAM(S): 0.1 TABLET ORAL at 06:55

## 2022-11-03 RX ADMIN — LISINOPRIL 2.5 MILLIGRAM(S): 2.5 TABLET ORAL at 17:25

## 2022-11-03 RX ADMIN — LATANOPROST 1 DROP(S): 0.05 SOLUTION/ DROPS OPHTHALMIC; TOPICAL at 21:32

## 2022-11-03 RX ADMIN — Medication 81 MILLIGRAM(S): at 12:07

## 2022-11-03 RX ADMIN — Medication 1 MILLIGRAM(S): at 12:07

## 2022-11-03 RX ADMIN — HEPARIN SODIUM 5000 UNIT(S): 5000 INJECTION INTRAVENOUS; SUBCUTANEOUS at 21:32

## 2022-11-03 NOTE — SWALLOW BEDSIDE ASSESSMENT ADULT - ESOPHAGEAL PHASE
Intermittent eructation s/p trials. Patient endorsed intermittent "regurgitation" episodes over the past year that have worsened.

## 2022-11-03 NOTE — SWALLOW BEDSIDE ASSESSMENT ADULT - COMMENTS
Neurology consult-->Impression: 1) Strokes are chronic, no acute strokes on MRI --> B/L cerebellar can contribute to unsteady gait   2) weakness and mental status changes over the last year likely 2/2 neurodegenerative d/o 3) orthostasis  Cardiology consult for Cardiac management.     11/01 CXR IMPRESSION: Clear lungs.  CT HEAD IMPRESSION: No acute hemorrhage or mass effect.    SWALLOW HISTORY: No reports in SCM or in PACS prior to this admission.

## 2022-11-03 NOTE — SWALLOW BEDSIDE ASSESSMENT ADULT - PHARYNGEAL PHASE
mild latency in the swallow response, palpable hyolaryngeal elevation, multiple swallows (1-3x) indicative of pharyngeal residue. No overt s/s aspiration observed with all trials administered. mild latency in the swallow response, palpable hyolaryngeal elevation, multiple swallows (1-3x) indicative of pharyngeal residue for all trials. No overt s/s aspiration observed with all trials administered.

## 2022-11-03 NOTE — PROGRESS NOTE ADULT - ASSESSMENT
83yo M pmhx of orthostatics Hypotension , Edema , HLD comes to ED w/ slurred speech, difficulty swallowing,  worsening over the past month.   overall sx started one month ago and pt was seen by neuro Dr. Navya Roblero..MRI per daughter showed "multiple strokes "   he was seen and worked up by cardio inclduing echo and 24 hr holter but jarquin "was negative "    Denies chest pain, SOB, fall, trauma, n/v/d, sick contacts, urinary symptoms.  reports cough to solids   has difficulty swallowing but Patient denies hx of CHF however takes lasix regularly for peripheral edema?     - Slurred speech/ dysphagia:  neuro checks   neuro consult noted : likley old infarcts ..no need for further eval   cardio input     - dysphagia : s/s     - cirrhosis on CT :US noted   pt with hx of cirrhosis     - edema:? sec t chf +/- florinef   dc lasix    orthostatic hypotension : midodrine and florinef    HTN urgency : will start small dose of lisinopril   maintain -160

## 2022-11-03 NOTE — PROGRESS NOTE ADULT - SUBJECTIVE AND OBJECTIVE BOX
Date of service: 22 @ 17:46      Patient is a 82y old  Male who presents with a chief complaint of                                                              INTERVAL HPI/OVERNIGHT EVENTS:    REVIEW OF SYSTEMS:     CONSTITUTIONAL: No weakness, fevers or chills  EYES/ENT: No visual changes , no ear ache   NECK: No pain or stiffness  RESPIRATORY: No cough, wheezing,  No shortness of breath  CARDIOVASCULAR: No chest pain or palpitations  GASTROINTESTINAL: No abdominal pain  . No nausea, vomiting, or hematemesis; No diarrhea or constipation. No melena or hematochezia.  GENITOURINARY: No dysuria, frequency or hematuria  NEUROLOGICAL: No numbness or weakness  SKIN: No itching, burning, rashes, or lesions                                                                                                                                                                                                                                                                                 Medications:  MEDICATIONS  (STANDING):  aspirin enteric coated 81 milliGRAM(s) Oral daily  atorvastatin 40 milliGRAM(s) Oral at bedtime  chlorhexidine 2% Cloths 1 Application(s) Topical <User Schedule>  dorzolamide 2% Ophthalmic Solution 1 Drop(s) Both EYES three times a day  fludroCORTISONE 0.1 milliGRAM(s) Oral daily  folic acid 1 milliGRAM(s) Oral daily  heparin   Injectable 5000 Unit(s) SubCutaneous every 8 hours  latanoprost 0.005% Ophthalmic Solution 1 Drop(s) Both EYES at bedtime  lisinopril 2.5 milliGRAM(s) Oral daily  midodrine. 5 milliGRAM(s) Oral three times a day  multivitamin 1 Tablet(s) Oral daily  thiamine 100 milliGRAM(s) Oral daily    MEDICATIONS  (PRN):       Allergies    No Known Allergies    Intolerances      Vital Signs Last 24 Hrs  T(C): 36.6 (2022 16:20), Max: 36.6 (2022 16:20)  T(F): 97.8 (2022 16:20), Max: 97.8 (2022 16:20)  HR: 61 (2022 16:20) (61 - 69)  BP: 195/83 (2022 16:20) (132/73 - 195/83)  BP(mean): --  RR: 18 (2022 16:20) (18 - 18)  SpO2: 96% (2022 16:20) (93% - 99%)    Parameters below as of 2022 11:00  Patient On (Oxygen Delivery Method): room air      CAPILLARY BLOOD GLUCOSE           @ 07:  -   @ 07:00  --------------------------------------------------------  IN: 240 mL / OUT: 1250 mL / NET: -1010 mL     @ 07:  -   @ 17:46  --------------------------------------------------------  IN: 480 mL / OUT: 0 mL / NET: 480 mL      Physical Exam:    Daily     Daily Weight in k.3 (2022 08:41)  General:  Well appearing, NAD, not cachetic  HEENT:  Nonicteric, PERRLA  CV:  RRR, S1S2   Lungs:  CTA B/L, no wheezes, rales, rhonchi  Abdomen:  Soft, non-tender, no distended, positive BS  Extremities:  no edema   Neuro:  AAOx3, non-focal, grossly intact                                                                                                                                                                                                                                                                                                LABS:                               12.8   4.90  )-----------( 120      ( 2022 06:44 )             38.8                          143  |  107  |  10  ----------------------------<  85  3.6   |  31  |  0.89    Ca    8.1<L>      2022 07:40  Phos  4.2       Mg     1.9         TPro  6.4  /  Alb  2.7<L>  /  TBili  1.9<H>  /  DBili  x   /  AST  28  /  ALT  21  /  AlkPhos  88                         RADIOLOGY & ADDITIONAL TESTS         I personally reviewed: [  ]EKG   [  ]CXR    [  ] CT      A/P:         Discussed with :     [place] consultants' Notes   Time spent :

## 2022-11-03 NOTE — SWALLOW BEDSIDE ASSESSMENT ADULT - SLP PERTINENT HISTORY OF CURRENT PROBLEM
81yo M with HTN, HLD, prior strokes, orthostatics hypotension follows with neuro Dr. Navya Roblero p/w generalized weakness, slurred speech, difficulty swallowing,  worsening over the past month but has been worsening for the last year.  started asa 2 weeks ago after MRI showed chronic appearing infarcts/ neuro called for evaluation.  MRI brain 10/5/22: FLAIR changes c/w small vessel disease.  multiple chornic tiny bilateral cerebellar infarcts. small left frontal DVA; chronic post fossa subdural hygroma 6mm

## 2022-11-03 NOTE — PROGRESS NOTE ADULT - ASSESSMENT
83yo M pmhx of HTN, HLD comes to ED w/ slurred speech, difficulty swallowing, worsening over the past month.

## 2022-11-03 NOTE — SWALLOW BEDSIDE ASSESSMENT ADULT - SWALLOW EVAL: PATIENT/FAMILY GOALS STATEMENT
Daughter Viji at the bedside endorsed pt received an MBS in October 2021 at [facility] with recommendations mildly thick liquids, however no aspiration observed. No changes to solids. Pt has had a baseline cough for the past year (not seen at time of evaluation). Daughter Viji at the bedside endorsed pt received an MBS in October 2021 at [facility] with recommendations mildly thick liquids, however no aspiration observed. No recommendations for changes to solids. Daughter stated pt has been drinking 1 drink per day that is thickened, however rest of liquid intake is thin. Pt has had a baseline cough for the past year (not seen at time of evaluation). Daughter Viji at the bedside endorsed pt received an MBS in October 2021 at [facility] with recommendations for soft-bite sized solids and mildly thick liquids, however no aspiration observed on examination as per daughter. Daughter stated pt has been drinking 1 drink per day that is thickened, however rest of liquid intake is thin. Pt has had a baseline cough for the past year (not seen at time of evaluation).

## 2022-11-03 NOTE — PROGRESS NOTE ADULT - SUBJECTIVE AND OBJECTIVE BOX
Subjective: Patient seen and examined. No new events except as noted.     REVIEW OF SYSTEMS:    CONSTITUTIONAL: + weakness, fevers or chills  EYES/ENT: No visual changes;  No vertigo or throat pain   NECK: No pain or stiffness  RESPIRATORY: No cough, wheezing, hemoptysis; No shortness of breath  CARDIOVASCULAR: No chest pain or palpitations  GASTROINTESTINAL: No abdominal or epigastric pain. No nausea, vomiting, or hematemesis; No diarrhea or constipation. No melena or hematochezia.  GENITOURINARY: No dysuria, frequency or hematuria  NEUROLOGICAL: No numbness or weakness  SKIN: No itching, burning, rashes, or lesions   All other review of systems is negative unless indicated above.    MEDICATIONS:  MEDICATIONS  (STANDING):  aspirin enteric coated 81 milliGRAM(s) Oral daily  atorvastatin 40 milliGRAM(s) Oral at bedtime  chlorhexidine 2% Cloths 1 Application(s) Topical <User Schedule>  dorzolamide 2% Ophthalmic Solution 1 Drop(s) Both EYES three times a day  fludroCORTISONE 0.1 milliGRAM(s) Oral daily  folic acid 1 milliGRAM(s) Oral daily  furosemide   Injectable 40 milliGRAM(s) IV Push daily  heparin   Injectable 5000 Unit(s) SubCutaneous every 8 hours  latanoprost 0.005% Ophthalmic Solution 1 Drop(s) Both EYES at bedtime  midodrine. 5 milliGRAM(s) Oral three times a day  multivitamin 1 Tablet(s) Oral daily  thiamine 100 milliGRAM(s) Oral daily    PHYSICAL EXAM:  T(C): 36.3 (11-03-22 @ 11:00), Max: 36.5 (11-02-22 @ 20:42)  HR: 63 (11-03-22 @ 11:00) (61 - 70)  BP: 179/90 (11-03-22 @ 12:04) (132/73 - 194/79)  RR: 18 (11-03-22 @ 11:00) (18 - 18)  SpO2: 95% (11-03-22 @ 11:00) (93% - 99%)  Wt(kg): --  I&O's Summary    02 Nov 2022 07:01  -  03 Nov 2022 07:00  --------------------------------------------------------  IN: 240 mL / OUT: 1250 mL / NET: -1010 mL    Appearance: Normal	  HEENT: Normal oral mucosa, PERRL, EOMI	  Lymphatic: No lymphadenopathy , no edema  Cardiovascular: Normal S1 S2, No JVD, No murmurs , Peripheral pulses palpable 2+ bilaterally  Respiratory: Lungs clear to auscultation, normal effort 	  Gastrointestinal:  Soft, Non-tender, + BS	  Skin: No rashes, No ecchymoses, No cyanosis, warm to touch  Musculoskeletal: Normal range of motion, normal strength  Psychiatry:  Mood & affect appropriate  Ext: No edema    LABS:    CARDIAC MARKERS:                       12.8   4.90  )-----------( 120      ( 02 Nov 2022 06:44 )             38.8     11-03    143  |  107  |  10  ----------------------------<  85  3.6   |  31  |  0.89    Ca    8.1<L>      03 Nov 2022 07:40  Phos  4.2     11-03  Mg     1.9     11-03    TPro  6.4  /  Alb  2.7<L>  /  TBili  1.9<H>  /  DBili  x   /  AST  28  /  ALT  21  /  AlkPhos  88  11-02    proBNP:   Lipid Profile:   HgA1c:   TSH:     TELEMETRY: 	    ECG:  	  RADIOLOGY:   DIAGNOSTIC TESTING:  [ ] Echocardiogram:  [ ]  Catheterization:  [ ] Stress Test:    OTHER: 	 Subjective: Patient seen and examined. No new events except as noted.   Pt feels ok, concerned about it blood pressure.  Discussed with Dr. Manriquez -190 this am but orthostatics still significantly positive.  Would d/c lasix for now, pt is -3.2L with decreased PO intake - S&S today, soft diet - rec IV maintenance fluids if remains with decreased PO intake.      REVIEW OF SYSTEMS:    CONSTITUTIONAL: + weakness, fevers or chills  EYES/ENT: No visual changes;  No vertigo or throat pain   NECK: No pain or stiffness  RESPIRATORY: No cough, wheezing, hemoptysis; No shortness of breath  CARDIOVASCULAR: No chest pain or palpitations  GASTROINTESTINAL: No abdominal or epigastric pain. No nausea, vomiting, or hematemesis; No diarrhea or constipation. No melena or hematochezia.  GENITOURINARY: No dysuria, frequency or hematuria  NEUROLOGICAL: No numbness or weakness  SKIN: No itching, burning, rashes, or lesions   All other review of systems is negative unless indicated above.    MEDICATIONS:  MEDICATIONS  (STANDING):  aspirin enteric coated 81 milliGRAM(s) Oral daily  atorvastatin 40 milliGRAM(s) Oral at bedtime  chlorhexidine 2% Cloths 1 Application(s) Topical <User Schedule>  dorzolamide 2% Ophthalmic Solution 1 Drop(s) Both EYES three times a day  fludroCORTISONE 0.1 milliGRAM(s) Oral daily  folic acid 1 milliGRAM(s) Oral daily  furosemide   Injectable 40 milliGRAM(s) IV Push daily  heparin   Injectable 5000 Unit(s) SubCutaneous every 8 hours  latanoprost 0.005% Ophthalmic Solution 1 Drop(s) Both EYES at bedtime  midodrine. 5 milliGRAM(s) Oral three times a day  multivitamin 1 Tablet(s) Oral daily  thiamine 100 milliGRAM(s) Oral daily    PHYSICAL EXAM:  T(C): 36.3 (11-03-22 @ 11:00), Max: 36.5 (11-02-22 @ 20:42)  HR: 63 (11-03-22 @ 11:00) (61 - 70)  BP: 179/90 (11-03-22 @ 12:04) (132/73 - 194/79)  RR: 18 (11-03-22 @ 11:00) (18 - 18)  SpO2: 95% (11-03-22 @ 11:00) (93% - 99%)  Wt(kg): --  I&O's Summary    02 Nov 2022 07:01  -  03 Nov 2022 07:00  --------------------------------------------------------  IN: 240 mL / OUT: 1250 mL / NET: -1010 mL    Appearance: NAD  HEENT: dry oral mucosa, PERRL, EOMI	  Lymphatic: No lymphadenopathy , no edema  Cardiovascular: Normal S1 S2, No JVD, No murmurs , Peripheral pulses palpable 2+ bilaterally  Respiratory: Lungs clear to auscultation, normal effort 	  Gastrointestinal:  Soft, Non-tender, + BS	  Skin: No rashes, No ecchymoses, No cyanosis, warm to touch  Musculoskeletal: Normal range of motion, normal strength  Psychiatry:  Mood & affect appropriate  Ext: + BL LE edeam    LABS:    CARDIAC MARKERS:                       12.8   4.90  )-----------( 120      ( 02 Nov 2022 06:44 )             38.8     11-03    143  |  107  |  10  ----------------------------<  85  3.6   |  31  |  0.89    Ca    8.1<L>      03 Nov 2022 07:40  Phos  4.2     11-03  Mg     1.9     11-03    TPro  6.4  /  Alb  2.7<L>  /  TBili  1.9<H>  /  DBili  x   /  AST  28  /  ALT  21  /  AlkPhos  88  11-02    proBNP:   Lipid Profile:   HgA1c:   TSH:     TELEMETRY: 	    ECG:  	  RADIOLOGY:   DIAGNOSTIC TESTING:  [ ] Echocardiogram:  [ ]  Catheterization:  [ ] Stress Test:    OTHER:

## 2022-11-03 NOTE — SWALLOW BEDSIDE ASSESSMENT ADULT - ASR SWALLOW ASPIRATION MONITOR
Monitor for s/s aspiration/laryngeal penetration. If noted:  D/C p.o. intake, provide non-oral nutrition/hydration/meds, and contact this service @ [phone]/change of breathing pattern/cough/gurgly voice/fever/pneumonia/throat clearing/upper respiratory infection

## 2022-11-03 NOTE — SWALLOW BEDSIDE ASSESSMENT ADULT - SWALLOW EVAL: DIAGNOSIS
Pt is an 81 y/o male p/w generalized weakness, slurred speech, and difficulty swallowing. MRI brain 10/5/22: FLAIR changes c/w small vessel disease. Multiple chronic tiny bilateral cerebellar infarcts. Pt p/w suspected pharyngo-esophageal dysphagia remarkable for mild latency in the swallow response, palpable hyolaryngeal elevation, multiple swallows (1-3x) indicative of pharyngeal residue, and intermittent eructation s/p trials. No overt s/s aspiration observed with all trials administered. Patient endorsed intermittent "regurgitation" episodes over the past year that have worsened.

## 2022-11-03 NOTE — SWALLOW BEDSIDE ASSESSMENT ADULT - SLP GENERAL OBSERVATIONS
Pt encountered upright in bed, on room air, awake/alert, Aox4. Daughter Viji at the bedside. Hoarse vocal quality that pt endorsed has slightly worsened over the past year ?LPR. Speech grossly intelligible, no dysarthria present. Patient able to follow directives and answer open ended questions for purposes of exam.

## 2022-11-04 PROCEDURE — 74230 X-RAY XM SWLNG FUNCJ C+: CPT | Mod: 26

## 2022-11-04 RX ADMIN — FLUDROCORTISONE ACETATE 0.1 MILLIGRAM(S): 0.1 TABLET ORAL at 05:40

## 2022-11-04 RX ADMIN — LATANOPROST 1 DROP(S): 0.05 SOLUTION/ DROPS OPHTHALMIC; TOPICAL at 22:00

## 2022-11-04 RX ADMIN — MIDODRINE HYDROCHLORIDE 5 MILLIGRAM(S): 2.5 TABLET ORAL at 05:39

## 2022-11-04 RX ADMIN — DORZOLAMIDE HYDROCHLORIDE 1 DROP(S): 20 SOLUTION/ DROPS OPHTHALMIC at 13:12

## 2022-11-04 RX ADMIN — HEPARIN SODIUM 5000 UNIT(S): 5000 INJECTION INTRAVENOUS; SUBCUTANEOUS at 22:01

## 2022-11-04 RX ADMIN — DORZOLAMIDE HYDROCHLORIDE 1 DROP(S): 20 SOLUTION/ DROPS OPHTHALMIC at 05:43

## 2022-11-04 RX ADMIN — DORZOLAMIDE HYDROCHLORIDE 1 DROP(S): 20 SOLUTION/ DROPS OPHTHALMIC at 22:00

## 2022-11-04 RX ADMIN — HEPARIN SODIUM 5000 UNIT(S): 5000 INJECTION INTRAVENOUS; SUBCUTANEOUS at 13:14

## 2022-11-04 RX ADMIN — Medication 100 MILLIGRAM(S): at 12:15

## 2022-11-04 RX ADMIN — LISINOPRIL 2.5 MILLIGRAM(S): 2.5 TABLET ORAL at 05:40

## 2022-11-04 RX ADMIN — CHLORHEXIDINE GLUCONATE 1 APPLICATION(S): 213 SOLUTION TOPICAL at 05:40

## 2022-11-04 RX ADMIN — Medication 81 MILLIGRAM(S): at 12:15

## 2022-11-04 RX ADMIN — Medication 1 MILLIGRAM(S): at 12:15

## 2022-11-04 RX ADMIN — Medication 1 TABLET(S): at 12:15

## 2022-11-04 RX ADMIN — HEPARIN SODIUM 5000 UNIT(S): 5000 INJECTION INTRAVENOUS; SUBCUTANEOUS at 05:40

## 2022-11-04 NOTE — SWALLOW VFSS/MBS ASSESSMENT ADULT - UNSUCCESSFUL STRATEGIES TRIALED DURING PROCEDURE
nonproductive volitional cough following clinician cue productive volitional cough following clinician cue Supraglottic swallow: PAS 8/nonproductive volitional cough following clinician cue

## 2022-11-04 NOTE — PROGRESS NOTE ADULT - SUBJECTIVE AND OBJECTIVE BOX
Subjective: Patient seen and examined. No new events except as noted.     REVIEW OF SYSTEMS:    CONSTITUTIONAL: No weakness, fevers or chills  EYES/ENT: No visual changes;  No vertigo or throat pain   NECK: No pain or stiffness  RESPIRATORY: No cough, wheezing, hemoptysis; No shortness of breath  CARDIOVASCULAR: No chest pain or palpitations  GASTROINTESTINAL: No abdominal or epigastric pain. No nausea, vomiting, or hematemesis; No diarrhea or constipation. No melena or hematochezia.  GENITOURINARY: No dysuria, frequency or hematuria  NEUROLOGICAL: No numbness or weakness  SKIN: No itching, burning, rashes, or lesions   All other review of systems is negative unless indicated above.    MEDICATIONS:  MEDICATIONS  (STANDING):  aspirin enteric coated 81 milliGRAM(s) Oral daily  atorvastatin 40 milliGRAM(s) Oral at bedtime  chlorhexidine 2% Cloths 1 Application(s) Topical <User Schedule>  dorzolamide 2% Ophthalmic Solution 1 Drop(s) Both EYES three times a day  fludroCORTISONE 0.1 milliGRAM(s) Oral daily  folic acid 1 milliGRAM(s) Oral daily  heparin   Injectable 5000 Unit(s) SubCutaneous every 8 hours  latanoprost 0.005% Ophthalmic Solution 1 Drop(s) Both EYES at bedtime  lisinopril 2.5 milliGRAM(s) Oral daily  midodrine. 5 milliGRAM(s) Oral three times a day  multivitamin 1 Tablet(s) Oral daily  thiamine 100 milliGRAM(s) Oral daily      PHYSICAL EXAM:  T(C): 36.3 (11-04-22 @ 04:18), Max: 36.6 (11-03-22 @ 16:20)  HR: 60 (11-04-22 @ 04:18) (60 - 78)  BP: 130/68 (11-04-22 @ 04:18) (129/77 - 195/83)  RR: 18 (11-04-22 @ 04:18) (18 - 18)  SpO2: 94% (11-04-22 @ 04:18) (94% - 96%)  Wt(kg): --  I&O's Summary    03 Nov 2022 07:01  -  04 Nov 2022 07:00  --------------------------------------------------------  IN: 480 mL / OUT: 0 mL / NET: 480 mL          Appearance: Normal	  HEENT:   Normal oral mucosa, PERRL, EOMI	  Lymphatic: No lymphadenopathy , no edema  Cardiovascular: Normal S1 S2, No JVD, No murmurs , Peripheral pulses palpable 2+ bilaterally  Respiratory: Lungs clear to auscultation, normal effort 	  Gastrointestinal:  Soft, Non-tender, + BS	  Skin: No rashes, No ecchymoses, No cyanosis, warm to touch  Musculoskeletal: Normal range of motion, normal strength  Psychiatry:  Mood & affect appropriate  Ext: No edema      LABS:    CARDIAC MARKERS:            11-03    143  |  107  |  10  ----------------------------<  85  3.6   |  31  |  0.89    Ca    8.1<L>      03 Nov 2022 07:40  Phos  4.2     11-03  Mg     1.9     11-03      proBNP:   Lipid Profile:   HgA1c:   TSH:             TELEMETRY: 	    ECG:  	  RADIOLOGY:   DIAGNOSTIC TESTING:  [ ] Echocardiogram:  [ ]  Catheterization:  [ ] Stress Test:    OTHER:

## 2022-11-04 NOTE — SWALLOW VFSS/MBS ASSESSMENT ADULT - ORAL PHASE
Residue in oral cavity/Incomplete tongue to palate contact/Uncontrolled bolus / spillover in justin-pharynx within functional limits/Residue in oral cavity

## 2022-11-04 NOTE — SWALLOW VFSS/MBS ASSESSMENT ADULT - ORAL PHASE COMMENTS
min-moderate residue in oral cavity Mildly thick liquids: premature spillage up to max in valleculae and minimum along aryepiglottic folds Mildly thick liquids: premature spillage up to max in valleculae and minimum along aryepiglottic folds  min oral cavity residue

## 2022-11-04 NOTE — SWALLOW VFSS/MBS ASSESSMENT ADULT - ROSENBEK'S PENETRATION ASPIRATION SCALE
(8) contrast passes glottis, visible subglottic residue remains, absent patient response (aspiration) Compensatory strategies employed, however unsuccessful/(8) contrast passes glottis, visible subglottic residue remains, absent patient response (aspiration)

## 2022-11-04 NOTE — PROGRESS NOTE ADULT - ASSESSMENT
81yo M pmhx of orthostatics Hypotension , Edema , HLD comes to ED w/ slurred speech, difficulty swallowing,  worsening over the past month.   overall sx started one month ago and pt was seen by neuro Dr. Navya Roblero..MRI per daughter showed "multiple strokes "   he was seen and worked up by cardio inclduing echo and 24 hr holter but jarquin "was negative "    Denies chest pain, SOB, fall, trauma, n/v/d, sick contacts, urinary symptoms.  reports cough to solids   has difficulty swallowing but Patient denies hx of CHF however takes lasix regularly for peripheral edema?     - Slurred speech/ dysphagia:  neuro checks   neuro consult noted : likley old infarcts ..no need for further eval   cardio input     - dysphagia : d/w S/S : failed MBS   will re eval       - cirrhosis on CT :US noted   pt with hx of cirrhosis     - edema:? sec t chf +/- florinef   dc lasix    orthostatic hypotension : midodrine and florinef    HTN urgency : will start small dose of lisinopril   maintain -160

## 2022-11-04 NOTE — SWALLOW VFSS/MBS ASSESSMENT ADULT - ASPIRATION DURING THE SWALLOW - SILENT
Material from over laryngeal surface of the epiglottis and over arytenoids; pt inconsistently sensate w/ cough; cued throat clear/cough does not appear to fully eject material from laryngeal vestibule./Trace/Mild Aspiration from material along the laryngeal surface of the epiglottis and over arytenoids; pt inconsistently sensate with cough; w/ volitional and cued cough, material ejected from vocal folds, however, material remains in laryngeal vestibule./Mild/Moderate Aspiration from material along the laryngeal surface of the epiglottis and over arytenoids; pt inconsistently sensate with cough; w/ volitional and cued cough, material ejected from vocal folds and sub-glottic space, however, min material remains in laryngeal vestibule./Mild/Moderate Aspiration from material over laryngeal surface of the epiglottis and over arytenoids; pt inconsistently sensate w/ cough; cued throat clear/cough does not appear to fully eject material from laryngeal vestibule./Trace/Mild

## 2022-11-04 NOTE — SWALLOW VFSS/MBS ASSESSMENT ADULT - DIAGNOSTIC IMPRESSIONS
Pt is an 83yo M with HTN, HLD, prior strokes, orthostatics hypotension p/w generalized weakness, slurred speech, difficulty swallowing, noted w/ chronic b/l cerebellar infarcts. Pt p/w mild oral and severe pharyngeal dysphagia. Swallow characterized by premature spillage of less viscous consistencies, severe pharyngeal residue with reduced clearance despite multiple swallows, with GROSS ASPIRATION with puree and all thickened liquids. Pt remains at high risk of aspiration with P.O. diet. Esophageal Phase: trace retention and air distention noted throughout trials.     Unsuccessful compensatory strategies: Supraglottic swallow unsuccessful in maintaining airway protection w/ noted continued aspiration on remaining material in airway.    Disorders: reduced tongue-palate seal, delayed initiation of swallow, reduced base of tongue retraction, reduced/absent epiglottis inversion, reduced anterior hyo-laryngeal excursion, pharyngeal contractibility. reduced UES dilation and distention. Pt is an 83yo M with HTN, HLD, prior strokes, orthostatics hypotension p/w generalized weakness, slurred speech, difficulty swallowing, noted w/ chronic b/l cerebellar infarcts. Pt p/w mild oral and severe pharyngeal dysphagia. Swallow characterized by premature spillage of less viscous consistencies, severe pharyngeal residue, multiple swallows clears material to min-mod residue, with GROSS ASPIRATION with puree and all thickened liquids. Pt remains at high risk of aspiration with P.O. diet. Esophageal Phase: trace retention and air distention noted throughout trials.     Unsuccessful compensatory strategies: Supraglottic swallow unsuccessful in maintaining airway protection w/ noted continued aspiration on remaining material in airway.    Disorders: reduced tongue-palate seal, delayed initiation of swallow, reduced base of tongue retraction, reduced/absent epiglottis inversion, reduced anterior hyo-laryngeal excursion, pharyngeal contractibility. reduced UES dilation and distention. Pt is an 83yo M with HTN, HLD, prior strokes, orthostatics hypotension p/w generalized weakness, slurred speech, difficulty swallowing, noted w/ chronic b/l cerebellar infarcts. Pt p/w mild oral and severe pharyngeal dysphagia. Swallow characterized by premature spillage of less viscous consistencies, severe pharyngeal residue, multiple swallows clears material to min-mod residue, with GROSS ASPIRATION with puree and all thickened liquids. Pt remains at high risk of aspiration with P.O. diet. Esophageal Phase: trace retention and air distention noted throughout trials.     Unsuccessful compensatory strategies: Supraglottic swallow unsuccessful in maintaining airway protection and resulted in aspiration     Disorders: reduced tongue-palate seal, delayed initiation of swallow, reduced base of tongue retraction, reduced/absent epiglottis inversion, reduced anterior hyo-laryngeal excursion, pharyngeal contractibility. reduced UES dilation and distention.

## 2022-11-04 NOTE — PROGRESS NOTE ADULT - SUBJECTIVE AND OBJECTIVE BOX
Date of service: 11-04-22 @ 15:55      Patient is a 82y old  Male who presents with a chief complaint of                                                              INTERVAL HPI/OVERNIGHT EVENTS:    REVIEW OF SYSTEMS:     CONSTITUTIONAL: No weakness, fevers or chills  EYES/ENT: No visual changes , no ear ache   NECK: No pain or stiffness  RESPIRATORY: No cough, wheezing,  No shortness of breath  CARDIOVASCULAR: No chest pain or palpitations  GASTROINTESTINAL: No abdominal pain  . No nausea, vomiting, or hematemesis; No diarrhea or constipation. No melena or hematochezia.  GENITOURINARY: No dysuria, frequency or hematuria  NEUROLOGICAL: No numbness or weakness  SKIN: No itching, burning, rashes, or lesions                                                                                                                                                                                                                                                                                 Medications:  MEDICATIONS  (STANDING):  aspirin enteric coated 81 milliGRAM(s) Oral daily  atorvastatin 40 milliGRAM(s) Oral at bedtime  chlorhexidine 2% Cloths 1 Application(s) Topical <User Schedule>  dorzolamide 2% Ophthalmic Solution 1 Drop(s) Both EYES three times a day  fludroCORTISONE 0.1 milliGRAM(s) Oral daily  folic acid 1 milliGRAM(s) Oral daily  heparin   Injectable 5000 Unit(s) SubCutaneous every 8 hours  latanoprost 0.005% Ophthalmic Solution 1 Drop(s) Both EYES at bedtime  lisinopril 2.5 milliGRAM(s) Oral daily  midodrine. 5 milliGRAM(s) Oral three times a day  multivitamin 1 Tablet(s) Oral daily  thiamine 100 milliGRAM(s) Oral daily    MEDICATIONS  (PRN):       Allergies    No Known Allergies    Intolerances      Vital Signs Last 24 Hrs  T(C): 36.9 (04 Nov 2022 12:24), Max: 36.9 (04 Nov 2022 12:24)  T(F): 98.4 (04 Nov 2022 12:24), Max: 98.4 (04 Nov 2022 12:24)  HR: 67 (04 Nov 2022 12:24) (60 - 78)  BP: 162/71 (04 Nov 2022 12:24) (129/77 - 195/83)  BP(mean): --  RR: 18 (04 Nov 2022 12:24) (18 - 18)  SpO2: 97% (04 Nov 2022 12:24) (94% - 97%)    Parameters below as of 04 Nov 2022 12:24  Patient On (Oxygen Delivery Method): room air      CAPILLARY BLOOD GLUCOSE          11-03 @ 07:01  -  11-04 @ 07:00  --------------------------------------------------------  IN: 480 mL / OUT: 0 mL / NET: 480 mL    11-04 @ 07:01  -  11-04 @ 15:55  --------------------------------------------------------  IN: 0 mL / OUT: 0 mL / NET: 0 mL      Physical Exam:    Daily     Daily   General:  Well appearing, NAD, not cachetic  HEENT:  Nonicteric, PERRLA  CV:  RRR, S1S2   Lungs:  CTA B/L, no wheezes, rales, rhonchi  Abdomen:  Soft, non-tender, no distended, positive BS  Extremities:  2+ pulses, no c/c, no edema  Skin:  Warm and dry, no rashes  :  No salinas  Neuro:  AAOx3, non-focal, grossly intact                                                                                                                                                                                                                                                                                                LABS:                            11-03    143  |  107  |  10  ----------------------------<  85  3.6   |  31  |  0.89    Ca    8.1<L>      03 Nov 2022 07:40  Phos  4.2     11-03  Mg     1.9     11-03                         RADIOLOGY & ADDITIONAL TESTS         I personally reviewed: [  ]EKG   [  ]CXR    [  ] CT      A/P:         Discussed with :     [place] consultants' Notes   Time spent :

## 2022-11-04 NOTE — SWALLOW VFSS/MBS ASSESSMENT ADULT - ASPIRATION AFTER SWALLOW - SILENT
With repeat dry swallows, material noted to further descend along anterior surface of trachea. from remaining material in laryngeal vestibule and vocal folds With repeat dry swallows, material noted to further descend along anterior surface of trachea. from remaining material in laryngeal vestibule and vocal folds/Mild

## 2022-11-05 DIAGNOSIS — R13.10 DYSPHAGIA, UNSPECIFIED: ICD-10-CM

## 2022-11-05 PROCEDURE — 74230 X-RAY XM SWLNG FUNCJ C+: CPT | Mod: 26

## 2022-11-05 RX ORDER — LISINOPRIL 2.5 MG/1
2.5 TABLET ORAL
Refills: 0 | Status: DISCONTINUED | OUTPATIENT
Start: 2022-11-05 | End: 2022-11-06

## 2022-11-05 RX ORDER — LANOLIN ALCOHOL/MO/W.PET/CERES
3 CREAM (GRAM) TOPICAL ONCE
Refills: 0 | Status: COMPLETED | OUTPATIENT
Start: 2022-11-05 | End: 2022-11-05

## 2022-11-05 RX ADMIN — HEPARIN SODIUM 5000 UNIT(S): 5000 INJECTION INTRAVENOUS; SUBCUTANEOUS at 05:30

## 2022-11-05 RX ADMIN — DORZOLAMIDE HYDROCHLORIDE 1 DROP(S): 20 SOLUTION/ DROPS OPHTHALMIC at 05:29

## 2022-11-05 RX ADMIN — HEPARIN SODIUM 5000 UNIT(S): 5000 INJECTION INTRAVENOUS; SUBCUTANEOUS at 13:38

## 2022-11-05 RX ADMIN — ATORVASTATIN CALCIUM 40 MILLIGRAM(S): 80 TABLET, FILM COATED ORAL at 21:39

## 2022-11-05 RX ADMIN — LATANOPROST 1 DROP(S): 0.05 SOLUTION/ DROPS OPHTHALMIC; TOPICAL at 21:39

## 2022-11-05 RX ADMIN — Medication 3 MILLIGRAM(S): at 23:18

## 2022-11-05 RX ADMIN — HEPARIN SODIUM 5000 UNIT(S): 5000 INJECTION INTRAVENOUS; SUBCUTANEOUS at 21:38

## 2022-11-05 RX ADMIN — DORZOLAMIDE HYDROCHLORIDE 1 DROP(S): 20 SOLUTION/ DROPS OPHTHALMIC at 21:39

## 2022-11-05 RX ADMIN — CHLORHEXIDINE GLUCONATE 1 APPLICATION(S): 213 SOLUTION TOPICAL at 05:30

## 2022-11-05 RX ADMIN — DORZOLAMIDE HYDROCHLORIDE 1 DROP(S): 20 SOLUTION/ DROPS OPHTHALMIC at 13:40

## 2022-11-05 NOTE — SWALLOW VFSS/MBS ASSESSMENT ADULT - POSITIONING
+ Chin tuck + L head turn w/ chin tuck w/ x2 subsequent swallows w/ supraglottic swallow./Lateral + L head turn. + L head turn w/ 2 subsequent swallows. + R head turn w/ 2 subsequent swallows. + R head turn w/ chin tuck. + L head turn w/ chin tuck w/ supraglottic swallow. + L head turn w/ chin tuck w/ 2 subsequent swallows w/ supraglottic swallow./Lateral

## 2022-11-05 NOTE — PROGRESS NOTE ADULT - ASSESSMENT
81yo M pmhx of HTN, HLD comes to ED w/ slurred speech, difficulty swallowing, worsening over the past month.

## 2022-11-05 NOTE — SWALLOW VFSS/MBS ASSESSMENT ADULT - RECOMMENDED CONSISTENCY
Based on results of this exam, primary recommendations for NPO, with non-oral nutrition/hydration/medications and repeat MBS
NPO, with non-oral nutrition/hydration/medications.

## 2022-11-05 NOTE — CONSULT NOTE ADULT - PROBLEM SELECTOR RECOMMENDATION 9
- Diet per SLP  - Can consider scopolamine to aid with secretion management   - Likely neurologic in etiology, recommend MRI   - No further ENT intervention at this time   - Call with questions or concerns
slurred speech, dysphagia    - CTH - negative    - CT A/P - cirrohosis - pending abd U/S  TTE - EF 78%, min MR, false tendon in the LV cavity (normal variant)  monitor on tele   neuro checks  neuro consult

## 2022-11-05 NOTE — SWALLOW VFSS/MBS ASSESSMENT ADULT - UNSUCCESSFUL STRATEGIES TRIALED DURING PROCEDURE
chin tuck + L head turn w/ chin tuck w/ x2 subsequent swallows w/ supraglottic swallow + L head turn. + L head turn w/ 2 subsequent swallows. + R head turn w/ 2 subsequent swallows. + R head turn w/ chin tuck. + L head turn w/ chin tuck w/ supraglottic swallow. + L head turn w/ chin tuck w/ 2 subsequent swallows w/ supraglottic swallow.

## 2022-11-05 NOTE — SWALLOW VFSS/MBS ASSESSMENT ADULT - ASPIRATION DURING THE SWALLOW - SILENT
over the laryngeal surface of the epiglottis/Mild Trace-moderate aspiration inconsistently observed during the swallow over the laryngeal surface of the epiglottis and over the arytenoids. Pt inconsistently sensate w/ cough; cued throat clear/cough does not appear to fully eject material from anterior surface of trachea

## 2022-11-05 NOTE — SWALLOW VFSS/MBS ASSESSMENT ADULT - MODE OF PRESENTATION
spoon spoon/fed by clinician Of note, end of study-->SOME of thin puree trials mislabeled as "THICK - tsp L head turn chin tuck x2 swallows supraglottic", which are all thin puree trials./spoon/fed by clinician

## 2022-11-05 NOTE — SWALLOW VFSS/MBS ASSESSMENT ADULT - SLP GENERAL OBSERVATIONS
Pt received in radiology suite, secure in DANIEL chair. Aox4, pleasant and cooperative, able to follow all complex directives, verbally expresses all wants/needs. Pt reports baseline phlegm w/ coughing up tan colored secretions prior to exam., which was also observed throughout study.
Pt received in radiology suite, secure in DANIEL chair. Aox4, pleasant and cooperative, able to follow all complex directives, verbally expresses all wants/needs. Pt reports baseline phlegm w/ coughing up tan colored secretions prior to exam.

## 2022-11-05 NOTE — SWALLOW VFSS/MBS ASSESSMENT ADULT - SPECIFY REASON(S)
to instrumentally assess swallow safety/function; r/o dysphagia.
to instrumentally assess swallow safety/function; r/o dysphagia.

## 2022-11-05 NOTE — SWALLOW VFSS/MBS ASSESSMENT ADULT - DIAGNOSTIC IMPRESSIONS
Pt is an 83yo M with HTN, HLD, prior strokes, orthostatics hypotension p/w generalized weakness, slurred speech, difficulty swallowing, noted w/ chronic b/l cerebellar infarcts. Pt p/w mild oral and severe pharyngeal dysphagia of unknown etiology given no acute changes. Given conservative textures with implementation of numerous compensatory strategies; airway protection is not maintained resulting in gross aspiration of all textures. Given swallow profile, patient remains at high risk for aspiration PNA. However, patient has been consuming a nonrestrictive diet at home for the past year with no notable complications and no reported acute changes on head/chest imaging. Suggest patient/caregiver/physician discussion regarding goals of care as they relate to nutrition/hydration. Consider (a) NPO with long-term enteral feeding route, which does not eliminate risk for aspiration PNA & will likely negatively impact QOL; versus (b) PO comfort feeding despite high risk for PNA/malnutrition/dehydration.    Disorders: reduced lingual strength, reduced base of tongue retraction, reduced/absent epiglottis inversion, reduced anterior hyo-laryngeal excursion, pharyngeal contractibility, reduced UES dilation and distension

## 2022-11-05 NOTE — SWALLOW VFSS/MBS ASSESSMENT ADULT - ADDITIONAL INFORMATION
+Anterior cervical osteophytes ~C3-7   +epiglottis abutting osteophyte ~C3 on initial dry swallow   +calcifications of anterior tracheal wall  +calcifications of laryngeal structures
+Anterior cervical osteophytes ~C3-7   +epiglottis abutting osteophyte ~C3 on initial dry swallow   +calcifications of anterior tracheal wall  +calcifications of laryngeal structures

## 2022-11-05 NOTE — PROGRESS NOTE ADULT - ASSESSMENT
81yo M pmhx of orthostatics Hypotension , Edema , HLD comes to ED w/ slurred speech, difficulty swallowing,  worsening over the past month.   overall sx started one month ago and pt was seen by neuro Dr. Navya Roblero..MRI per daughter showed "multiple strokes "   he was seen and worked up by cardio inclduing echo and 24 hr holter but jarquin "was negative "    Denies chest pain, SOB, fall, trauma, n/v/d, sick contacts, urinary symptoms.  reports cough to solids   has difficulty swallowing but Patient denies hx of CHF however takes lasix regularly for peripheral edema?     - Slurred speech/ dysphagia:  neuro checks   neuro consult noted : likley old infarcts ..no need for further eval   cardio input     - dysphagia : d/w S/S : failed MBS   consult GI       - cirrhosis on CT :US noted   pt with hx of cirrhosis     - edema:? sec t chf +/- florinef   dc lasix    orthostatic hypotension : midodrine and florinef    HTN urgency : increase  lisinopril   maintain -160

## 2022-11-05 NOTE — SWALLOW VFSS/MBS ASSESSMENT ADULT - ESOPHAGEAL STAGE
Trace retention and air distension noted in proximal esophagus Trace retention and air distention noted in proximal esophagus

## 2022-11-05 NOTE — PROGRESS NOTE ADULT - SUBJECTIVE AND OBJECTIVE BOX
Neurology Progress Note    S: Patient seen and examined. No new events overnight.  going for  eval this AM     Medication:  aspirin enteric coated 81 milliGRAM(s) Oral daily  atorvastatin 40 milliGRAM(s) Oral at bedtime  chlorhexidine 2% Cloths 1 Application(s) Topical <User Schedule>  dorzolamide 2% Ophthalmic Solution 1 Drop(s) Both EYES three times a day  fludroCORTISONE 0.1 milliGRAM(s) Oral daily  folic acid 1 milliGRAM(s) Oral daily  heparin   Injectable 5000 Unit(s) SubCutaneous every 8 hours  latanoprost 0.005% Ophthalmic Solution 1 Drop(s) Both EYES at bedtime  lisinopril 2.5 milliGRAM(s) Oral daily  midodrine. 5 milliGRAM(s) Oral three times a day  multivitamin 1 Tablet(s) Oral daily  thiamine 100 milliGRAM(s) Oral daily      Vitals:  Vital Signs Last 24 Hrs  T(C): 36.6 (05 Nov 2022 09:25), Max: 36.9 (04 Nov 2022 12:24)  T(F): 97.8 (05 Nov 2022 09:25), Max: 98.5 (05 Nov 2022 04:33)  HR: 70 (05 Nov 2022 09:25) (67 - 77)  BP: 155/77 (05 Nov 2022 09:25) (153/73 - 162/71)  BP(mean): --  RR: 18 (05 Nov 2022 09:25) (18 - 18)  SpO2: 95% (05 Nov 2022 09:25) (92% - 97%)    Parameters below as of 05 Nov 2022 09:25  Patient On (Oxygen Delivery Method): room air    Orthostatic VS    11-04-22 @ 10:02  Lying BP: 178/76 HR: 67   Sitting BP: 151/73 HR: 70  Standing BP: 136/75 HR: 76  Site: --   Mode: --      General Exam:   General Appearance: Appropriately dressed and in no acute distress       Head: Normocephalic, atraumatic and no dysmorphic features  Ear, Nose, and Throat: Moist mucous membranes  CVS: S1S2+  Resp: No SOB, no wheeze or rhonchi  Abd: soft NTND  Extremities: No edema, no cyanosis  Skin: No bruises, no rashes     Neurological Exam:  Mental Status: Awake, alert and oriented x 2.  Able to follow simple and complex verbal commands. Able to name and repeat. fluent speech. No obvious aphasia or dysarthria noted.   Cranial Nerves: PERRL, EOMI, VFFC, sensation V1-V3 intact,  no obvious facial asymmetry , equal elevation of palate, scm/trap 5/5, tongue is midline on protrusion. no obvious papilledema on fundoscopic exam. Hearing is grossly intact.   Motor: Normal bulk, tone and strength throughout. Fine finger movements were intact and symmetric. no tremors or drift noted.    Sensation: Intact to light touch and pinprick throughout. no right/left confusion. no extinction to tactile on DSS.   Reflexes: 1+ throughout at biceps, brachioradialis, triceps, patellars and ankles bilaterally and equal. No clonus. R toe and L toe were both downgoing.  Coordination: No dysmetria on FNF or HKS  Gait: unsteady     I personally reviewed the below data/images/labs:

## 2022-11-05 NOTE — SWALLOW VFSS/MBS ASSESSMENT ADULT - SLP PERTINENT HISTORY OF CURRENT PROBLEM
81yo M with HTN, HLD, prior strokes, orthostatics hypotension follows with neuro Dr. Navya Roblero p/w generalized weakness, slurred speech, difficulty swallowing,  worsening over the past month but has been worsening for the last year.  started asa 2 weeks ago after MRI showed chronic appearing infarcts/ neuro called for evaluation.  MRI brain 10/5/22: FLAIR changes c/w small vessel disease.  multiple chornic tiny bilateral cerebellar infarcts. small left frontal DVA; chronic post fossa subdural hygroma 6mm
81yo M with HTN, HLD, prior strokes, orthostatics hypotension follows with neuro Dr. Navya Roblero p/w generalized weakness, slurred speech, difficulty swallowing,  worsening over the past month but has been worsening for the last year.  started asa 2 weeks ago after MRI showed chronic appearing infarcts/ neuro called for evaluation.  MRI brain 10/5/22: FLAIR changes c/w small vessel disease.  multiple chornic tiny bilateral cerebellar infarcts. small left frontal DVA; chronic post fossa subdural hygroma 6mm

## 2022-11-05 NOTE — SWALLOW VFSS/MBS ASSESSMENT ADULT - ORAL PHASE
Trace oral residue Piecemeal deglutition; mild-moderate oral residue Piecemeal deglutition; mild oral residue/Reduced anterior - posterior transport Piecemeal deglutition; mild oral residue/Delayed oral transit time

## 2022-11-05 NOTE — SWALLOW VFSS/MBS ASSESSMENT ADULT - THE ABOVE FINDINGS WERE DISCUSSED WITH
pt; daughter; JENNIFER Jamison; MD Conde/Physician/Nursing/Patient/Family
ADELIA Hanna, CHAVO Addison, patient's daughter, patient

## 2022-11-05 NOTE — CONSULT NOTE ADULT - SUBJECTIVE AND OBJECTIVE BOX
CC: dysphagia     HPI: 83yo M pmhx of HTN, HLD comes to ED w/ slurred speech, difficulty swallowing, worsening over the past month. Overall sx started one month ago and pt was seen by neuro Dr. Navya Roblero. MRI per daughter showed "multiple strokes." He was seen and worked up by cardio including echo and 24 hr holter but jarquin "was negative." Reports cough to solids, has difficulty swallowing, and has a hoarse voice. Also reports a chronic dry cough not associated with food or drink as well. Reportedly had MBS done 1 year ago which did not demonstrate aspiration however recommended thickened liquids at that time. Pt had MBS done today which demonstrated gross aspiration of all textures. Pt denies any pain but admits to sensation of acid reflux. Denies odynophagia, dysphonia, sob, dyspnea.      PAST MEDICAL & SURGICAL HISTORY:  H/O orthostatic hypotension      CVA (cerebrovascular accident)      History of hip surgery        Allergies    No Known Allergies    Intolerances      MEDICATIONS  (STANDING):  aspirin enteric coated 81 milliGRAM(s) Oral daily  atorvastatin 40 milliGRAM(s) Oral at bedtime  chlorhexidine 2% Cloths 1 Application(s) Topical <User Schedule>  dorzolamide 2% Ophthalmic Solution 1 Drop(s) Both EYES three times a day  fludroCORTISONE 0.1 milliGRAM(s) Oral daily  folic acid 1 milliGRAM(s) Oral daily  heparin   Injectable 5000 Unit(s) SubCutaneous every 8 hours  latanoprost 0.005% Ophthalmic Solution 1 Drop(s) Both EYES at bedtime  lisinopril 2.5 milliGRAM(s) Oral daily  midodrine. 5 milliGRAM(s) Oral three times a day  multivitamin 1 Tablet(s) Oral daily  thiamine 100 milliGRAM(s) Oral daily    MEDICATIONS  (PRN):      Social History: no pertinent social history     Family history:  No pertinent family history in first degree relatives.    ROS:   ENT: all negative except as noted in HPI   CV: denies palpitations  Pulm: denies SOB, cough, hemoptysis  GI: denies change in appetite, indigestion, n/v  : denies pertinent urinary symptoms, urgency  Neuro: denies numbness/tingling, loss of sensation  Psych: denies anxiety  MS: denies muscle weakness, instability  Heme: denies easy bruising or bleeding  Endo: denies heat/cold intolerance, excessive sweating  Vascular: denies LE edema    Vital Signs Last 24 Hrs  T(C): 36.9 (05 Nov 2022 12:30), Max: 36.9 (05 Nov 2022 04:33)  T(F): 98.4 (05 Nov 2022 12:30), Max: 98.5 (05 Nov 2022 04:33)  HR: 68 (05 Nov 2022 12:30) (68 - 77)  BP: 180/84 (05 Nov 2022 12:30) (153/73 - 180/84)  BP(mean): --  RR: 18 (05 Nov 2022 12:30) (18 - 18)  SpO2: 95% (05 Nov 2022 12:30) (92% - 95%)    Parameters below as of 05 Nov 2022 12:30  Patient On (Oxygen Delivery Method): room air           PHYSICAL EXAM:  Gen: NAD  Skin: No rashes, bruises, or lesions  Head: Normocephalic, Atraumatic  Face: no edema, erythema, or fluctuance. Parotid glands soft without mass  Eyes: no scleral injection  Nose: Nares bilaterally patent, no discharge  Mouth: No Stridor / Drooling / Trismus.  Mucosa moist, tongue/uvula midline, oropharynx clear  Neck: Flat, supple, no lymphadenopathy, trachea midline, no masses  Lymphatic: No lymphadenopathy  Resp: breathing easily, no stridor  CV: no peripheral edema/cyanosis  GI: nondistended   Peripheral vascular: no JVD or edema  Neuro: facial nerve intact, no facial droop        Fiberoptic Indirect laryngoscopy:  (Scope #2 used)  Reason for Laryngoscopy: dysphagia     Patient was unable to cooperate with mirror.  +pooling of secretions with gross aspiration. B/L vocal cords mobile with good contact, however +poor excursion. +Small benign appearing cyst in right vallecular space. Nasopharynx, oropharynx, and hypopharynx clear, no bleeding. Tongue base, posterior pharyngeal wall, epiglottis, and subglottis appear normal. No erythema, edema or lesions. Airway patent, no foreign body visualized. No glottic/supraglottic edema. Arytenoids, vestibular folds, ventricles, pyriform sinuses, and aryepiglottic folds appear normal bilaterally.

## 2022-11-05 NOTE — PROGRESS NOTE ADULT - SUBJECTIVE AND OBJECTIVE BOX
Subjective: Patient seen and examined. No new events except as noted.     REVIEW OF SYSTEMS:    CONSTITUTIONAL: + weakness, fevers or chills  EYES/ENT: No visual changes;  No vertigo or throat pain   NECK: No pain or stiffness  RESPIRATORY: No cough, wheezing, hemoptysis; No shortness of breath  CARDIOVASCULAR: No chest pain or palpitations  GASTROINTESTINAL: No abdominal or epigastric pain. No nausea, vomiting, or hematemesis; No diarrhea or constipation. No melena or hematochezia.  GENITOURINARY: No dysuria, frequency or hematuria  NEUROLOGICAL: No numbness or weakness  SKIN: No itching, burning, rashes, or lesions   All other review of systems is negative unless indicated above.    MEDICATIONS:  MEDICATIONS  (STANDING):  aspirin enteric coated 81 milliGRAM(s) Oral daily  atorvastatin 40 milliGRAM(s) Oral at bedtime  chlorhexidine 2% Cloths 1 Application(s) Topical <User Schedule>  dorzolamide 2% Ophthalmic Solution 1 Drop(s) Both EYES three times a day  fludroCORTISONE 0.1 milliGRAM(s) Oral daily  folic acid 1 milliGRAM(s) Oral daily  heparin   Injectable 5000 Unit(s) SubCutaneous every 8 hours  latanoprost 0.005% Ophthalmic Solution 1 Drop(s) Both EYES at bedtime  lisinopril 2.5 milliGRAM(s) Oral daily  midodrine. 5 milliGRAM(s) Oral three times a day  multivitamin 1 Tablet(s) Oral daily  thiamine 100 milliGRAM(s) Oral daily    PHYSICAL EXAM:  Vital Signs Last 24 Hrs  T(C): 36.9 (05 Nov 2022 04:33), Max: 36.9 (04 Nov 2022 12:24)  T(F): 98.5 (05 Nov 2022 04:33), Max: 98.5 (05 Nov 2022 04:33)  HR: 70 (05 Nov 2022 04:33) (67 - 77)  BP: 159/81 (05 Nov 2022 04:33) (153/73 - 162/71)  BP(mean): --  RR: 18 (05 Nov 2022 04:33) (18 - 18)  SpO2: 92% (05 Nov 2022 04:33) (92% - 97%)    Parameters below as of 05 Nov 2022 04:33  Patient On (Oxygen Delivery Method): room air    I&O's Summary    04 Nov 2022 07:01  -  05 Nov 2022 07:00  --------------------------------------------------------  IN: 0 mL / OUT: 0 mL / NET: 0 mL    Appearance: Normal	  HEENT: Normal oral mucosa, PERRL, EOMI	  Lymphatic: No lymphadenopathy , no edema  Cardiovascular: Normal S1 S2, No JVD, No murmurs , Peripheral pulses palpable 2+ bilaterally  Respiratory: Lungs clear to auscultation, normal effort 	  Gastrointestinal:  Soft, Non-tender, + BS	  Skin: No rashes, No ecchymoses, No cyanosis, warm to touch  Musculoskeletal: Normal range of motion, normal strength  Psychiatry:  Mood & affect appropriate  Ext: No edema    LABS:    CARDIAC MARKERS:    11-03    143  |  107  |  10  ----------------------------<  85  3.6   |  31  |  0.89    Ca    8.1<L>      03 Nov 2022 07:40  Phos  4.2     11-03  Mg     1.9     11-03    proBNP:   Lipid Profile:   HgA1c:   TSH:     TELEMETRY: SR 60-80  ECG:  	  RADIOLOGY:   DIAGNOSTIC TESTING:  [ ] Echocardiogram:  [ ]  Catheterization:  [ ] Stress Test:    OTHER:

## 2022-11-05 NOTE — SWALLOW VFSS/MBS ASSESSMENT ADULT - LARYNGEAL PENETRATION DURING THE SWALLOW - SILENT
deep; over the laryngeal surface of the epiglottis, not fully ejected. Trace material in laryngeal vestibule/Moderate

## 2022-11-05 NOTE — SWALLOW VFSS/MBS ASSESSMENT ADULT - ASPIRATION AFTER SWALLOW - SILENT
with repeat dry swallows, moderate material noted to descend below the vocal cords along the anterior surface of the trachea. Pt inconsistently sensate with cough, which is ineffective in reducing/eliminating material from subglottic space. with repeat dry swallows, inconsistent trace-moderate material noted to descend below the vocal cords along the anterior surface of the trachea. Pt inconsistently sensate with cough, which is ineffective in reducing/eliminating material from subglottic space.

## 2022-11-05 NOTE — PROGRESS NOTE ADULT - ASSESSMENT
81yo M with HTN, HLD, prior strokes, orthostatics hypotension follows with neuro Dr. Navya Roblero p/w generalized weakness, slurred speech, difficulty swallowing,  worsening over the past month but has been worsening for the last year.  started asa 2 weeks ago after MRI showed chronic appearing infarcts/ neuro called for evaluation.    MRI brain 10/5/22: FLAIR changes c/w small vessel disease.  multiple chornic tiny bilateral cerebellar infarcts. small left frontal DVA; chronic post fossa subdural hygroma 6mm   MRA H unremarkable   + orthostasis   TSH WNL   CTH no acute findings   TTE done   o/e AAOx2-3, FISCHER   A1c 5.3  LDL 50   Impression:   1) Strokes are chronic, no acute strokes on MRI --> B/L cerebellar can contribute to unsteady gait   2) weakness and mental status changes over the last year likely 2/2 neurodegenerative d/o   3) orthostasis     - f/u SS eval   - severely  orthostatics BP lying 169/78--> standing 112/70 --> on midodrine.  compression stockings ; fludrocortisone added. trend orthostatics  -  ASA 81mg PO daily.  - High dose statin therapy - atorvastatin 40mg PO daily. LDL goal <70mg/dL.  - CD    - eeg in or outpatient   - cardio recs appreciated   - telemetry  - RPR, TSH, B12   - PT/OT/SS/SLP, OOBC  - check FS, glucose control <180  - GI/DVT ppx  - Thank you for allowing me to participate in the care of this patient. Call with questions.   - spoke with daughter at bedside   Lonnie Guaman MD  Vascular Neurology  Office: [phone]  83yo M with HTN, HLD, prior strokes, orthostatics hypotension follows with neuro Dr. Navya Roblero p/w generalized weakness, slurred speech, difficulty swallowing,  worsening over the past month but has been worsening for the last year.  started asa 2 weeks ago after MRI showed chronic appearing infarcts/ neuro called for evaluation.    MRI brain 10/5/22: FLAIR changes c/w small vessel disease.  multiple chornic tiny bilateral cerebellar infarcts. 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    3) orthostasis     - f/u SS eval   - would alsoi consider sending Myasthenia labs to look for alternative explanation of dysphagia.  send Ach Abs (binding, blocking, modulating) and also MUSK Ab   - may also benefit from FDG PET brain imaging to look for hypometabolism in brainstem, putamen    - severely  orthostatics BP lying 169/78--> standing 112/70 --> on midodrine.  compression stockings ; fludrocortisone added. trend orthostatics  -  ASA 81mg PO daily.  - High dose statin therapy - atorvastatin 40mg PO daily. LDL goal <70mg/dL.  - CD    - eeg in or outpatient   - cardio recs appreciated   - telemetry  - RPR, TSH, B12   - PT/OT/SS/SLP, OOBC  - check FS, glucose control <180  - GI/DVT ppx  - Thank you for allowing me to participate in the care of this patient. Call with questions.   - spoke with daughter at bedside   Lonnie Guaman MD  Vascular Neurology  Office: [phone]

## 2022-11-05 NOTE — PROGRESS NOTE ADULT - SUBJECTIVE AND OBJECTIVE BOX
Date of service: 22 @ 23:51      Patient is a 82y old  Male who presents with a chief complaint of CVA (2022 16:59)                                                               INTERVAL HPI/OVERNIGHT EVENTS:    REVIEW OF SYSTEMS:     CONSTITUTIONAL: No weakness, fevers or chills  EYES/ENT: No visual changes , no ear ache   NECK: No pain or stiffness  RESPIRATORY: No cough, wheezing,  No shortness of breath  CARDIOVASCULAR: No chest pain or palpitations  GASTROINTESTINAL: No abdominal pain  . No nausea, vomiting, or hematemesis; No diarrhea or constipation. No melena or hematochezia.  GENITOURINARY: No dysuria, frequency or hematuria  NEUROLOGICAL: No numbness or weakness  SKIN: No itching, burning, rashes, or lesions                                                                                                                                                                                                                                                                                 Medications:  MEDICATIONS  (STANDING):  aspirin enteric coated 81 milliGRAM(s) Oral daily  atorvastatin 40 milliGRAM(s) Oral at bedtime  chlorhexidine 2% Cloths 1 Application(s) Topical <User Schedule>  dorzolamide 2% Ophthalmic Solution 1 Drop(s) Both EYES three times a day  fludroCORTISONE 0.1 milliGRAM(s) Oral daily  folic acid 1 milliGRAM(s) Oral daily  heparin   Injectable 5000 Unit(s) SubCutaneous every 8 hours  latanoprost 0.005% Ophthalmic Solution 1 Drop(s) Both EYES at bedtime  lisinopril 2.5 milliGRAM(s) Oral two times a day  midodrine. 5 milliGRAM(s) Oral three times a day  multivitamin 1 Tablet(s) Oral daily  thiamine 100 milliGRAM(s) Oral daily    MEDICATIONS  (PRN):       Allergies    No Known Allergies    Intolerances      Vital Signs Last 24 Hrs  T(C): 36.9 (2022 20:05), Max: 36.9 (2022 04:33)  T(F): 98.5 (2022 20:05), Max: 98.5 (2022 04:33)  HR: 64 (2022 20:05) (64 - 70)  BP: 149/75 (2022 20:05) (149/75 - 187/81)  BP(mean): --  RR: 17 (2022 20:05) (17 - 18)  SpO2: 96% (2022 20:05) (92% - 96%)    Parameters below as of 2022 20:05  Patient On (Oxygen Delivery Method): room air      CAPILLARY BLOOD GLUCOSE           @ 07:01  -   @ 07:00  --------------------------------------------------------  IN: 0 mL / OUT: 0 mL / NET: 0 mL      Physical Exam:    Daily     Daily Weight in k.6 (2022 12:36)  General:  Well appearing, NAD, not cachetic  HEENT:  Nonicteric, PERRLA  CV:  RRR, S1S2   Lungs:  scaterred ronchi   Abdomen:  Soft, non-tender, no distended, positive BS  Extremities:  edema   Neuro:  AAOx3, non-focal, grossly intact                                                                                                                                                                                                                                                                                                LABS:                                                     RADIOLOGY & ADDITIONAL TESTS         I personally reviewed: [  ]EKG   [  ]CXR    [  ] CT      A/P:         Discussed with :     [place] consultants' Notes   Time spent :

## 2022-11-05 NOTE — SWALLOW VFSS/MBS ASSESSMENT ADULT - COMMENTS
Neurology consult-->Impression: 1) Strokes are chronic, no acute strokes on MRI 10/5/22--> B/L cerebellar can contribute to unsteady gait   2) weakness and mental status changes over the last year likely 2/2 neurodegenerative d/o 3) orthostasis  Cardiology consult for Cardiac management.     11/01 CXR IMPRESSION: Clear lungs.  CT HEAD IMPRESSION: No acute hemorrhage or mass effect.    Initial bedside evaluation 11/3/22: Rx for puree/thin liquids and MBS given pt reports of frequent regurgitation to r/o esophageal component to dysphagia.
Neurology consult-->Impression: 1) Strokes are chronic, no acute strokes on MRI 10/5/22--> B/L cerebellar can contribute to unsteady gait   2) weakness and mental status changes over the last year likely 2/2 neurodegenerative d/o 3) orthostasis  Cardiology consult for Cardiac management.     11/01 CXR IMPRESSION: Clear lungs.  CT HEAD IMPRESSION: No acute hemorrhage or mass effect.    Initial bedside evaluation 11/3/22: Rx for soft-bite sized/thin liquids and MBS given pt reports of frequent regurgitation to r/o esophageal component to dysphagia.   Patient seen for MBS 11/4 with recommendations for NPO, with non-oral nutrition/hydration/medications given gross aspiration with all textures. Repeat MBS recommended to employ compensatory strategies and assess effectiveness in airway protection.

## 2022-11-05 NOTE — PROGRESS NOTE ADULT - SUBJECTIVE AND OBJECTIVE BOX
Received call from speech and swallow specialist today. Patient had a repeat failed MBS. NPO was recommended, however patient is requesting to eat. Case d/w daughter at bedside, who would like to abide by patient's wishes and allow him to eat mince, moist diet. Patient and daughter are fully aware of recommended NPO, until further alternative is found. Patient's family is not considering PEG insertion or any other alternative for now. They are clearly aware of high aspiration risks if oral diet is re-initiated. They are also aware of possible chocking, aspiration due and impending respiratory failure with oral diet. Orders to be placed for mince moist diet as per patient and family's request

## 2022-11-06 LAB — BILIRUB DIRECT SERPL-MCNC: 0.5 MG/DL — HIGH (ref 0–0.3)

## 2022-11-06 RX ORDER — LANOLIN ALCOHOL/MO/W.PET/CERES
3 CREAM (GRAM) TOPICAL AT BEDTIME
Refills: 0 | Status: DISCONTINUED | OUTPATIENT
Start: 2022-11-06 | End: 2022-11-07

## 2022-11-06 RX ORDER — ROBINUL 0.2 MG/ML
1 INJECTION INTRAMUSCULAR; INTRAVENOUS DAILY
Refills: 0 | Status: DISCONTINUED | OUTPATIENT
Start: 2022-11-06 | End: 2022-11-07

## 2022-11-06 RX ORDER — SENNA PLUS 8.6 MG/1
2 TABLET ORAL AT BEDTIME
Refills: 0 | Status: DISCONTINUED | OUTPATIENT
Start: 2022-11-06 | End: 2022-11-07

## 2022-11-06 RX ORDER — FLUTICASONE PROPIONATE 50 MCG
1 SPRAY, SUSPENSION NASAL
Refills: 0 | Status: DISCONTINUED | OUTPATIENT
Start: 2022-11-06 | End: 2022-11-07

## 2022-11-06 RX ORDER — LORATADINE 10 MG/1
10 TABLET ORAL DAILY
Refills: 0 | Status: DISCONTINUED | OUTPATIENT
Start: 2022-11-06 | End: 2022-11-07

## 2022-11-06 RX ADMIN — LORATADINE 10 MILLIGRAM(S): 10 TABLET ORAL at 18:22

## 2022-11-06 RX ADMIN — Medication 1 TABLET(S): at 11:20

## 2022-11-06 RX ADMIN — Medication 1 MILLIGRAM(S): at 11:20

## 2022-11-06 RX ADMIN — Medication 5 MILLIGRAM(S): at 23:04

## 2022-11-06 RX ADMIN — Medication 3 MILLIGRAM(S): at 23:04

## 2022-11-06 RX ADMIN — LATANOPROST 1 DROP(S): 0.05 SOLUTION/ DROPS OPHTHALMIC; TOPICAL at 21:35

## 2022-11-06 RX ADMIN — HEPARIN SODIUM 5000 UNIT(S): 5000 INJECTION INTRAVENOUS; SUBCUTANEOUS at 21:34

## 2022-11-06 RX ADMIN — Medication 1 SPRAY(S): at 18:23

## 2022-11-06 RX ADMIN — ATORVASTATIN CALCIUM 40 MILLIGRAM(S): 80 TABLET, FILM COATED ORAL at 21:34

## 2022-11-06 RX ADMIN — DORZOLAMIDE HYDROCHLORIDE 1 DROP(S): 20 SOLUTION/ DROPS OPHTHALMIC at 21:34

## 2022-11-06 RX ADMIN — FLUDROCORTISONE ACETATE 0.1 MILLIGRAM(S): 0.1 TABLET ORAL at 05:10

## 2022-11-06 RX ADMIN — Medication 81 MILLIGRAM(S): at 11:19

## 2022-11-06 RX ADMIN — SENNA PLUS 2 TABLET(S): 8.6 TABLET ORAL at 23:04

## 2022-11-06 RX ADMIN — HEPARIN SODIUM 5000 UNIT(S): 5000 INJECTION INTRAVENOUS; SUBCUTANEOUS at 05:09

## 2022-11-06 RX ADMIN — DORZOLAMIDE HYDROCHLORIDE 1 DROP(S): 20 SOLUTION/ DROPS OPHTHALMIC at 05:11

## 2022-11-06 RX ADMIN — MIDODRINE HYDROCHLORIDE 5 MILLIGRAM(S): 2.5 TABLET ORAL at 17:12

## 2022-11-06 RX ADMIN — CHLORHEXIDINE GLUCONATE 1 APPLICATION(S): 213 SOLUTION TOPICAL at 05:11

## 2022-11-06 RX ADMIN — LISINOPRIL 2.5 MILLIGRAM(S): 2.5 TABLET ORAL at 05:12

## 2022-11-06 RX ADMIN — HEPARIN SODIUM 5000 UNIT(S): 5000 INJECTION INTRAVENOUS; SUBCUTANEOUS at 13:23

## 2022-11-06 RX ADMIN — DORZOLAMIDE HYDROCHLORIDE 1 DROP(S): 20 SOLUTION/ DROPS OPHTHALMIC at 13:23

## 2022-11-06 RX ADMIN — Medication 100 MILLIGRAM(S): at 11:20

## 2022-11-06 NOTE — CONSULT NOTE ADULT - SUBJECTIVE AND OBJECTIVE BOX
oropharyngeal dysphagia, declining peg  possible early cirrhosis  no inpt GI intervention at this time  full note to follow Chief Complaint:  Patient is a 82y old  Male who presents with a chief complaint of CVA (2022 16:59)      Date of service: 22 @ 19:01    HPI:    The patient is a 82 year old man with orthostatic hypotension who presented with slurred speech.     The patient denies dysphagia, nausea and vomiting, abdominal pain, diarrhea, unintentional weight loss, change in bowel habits or NSAID use.    He reports a history of heavy alcohol use but he stopped about 30 years ago.    He has no known history of liver diseae.    He was diagnosed with dysphagia but he does not feel any difficulty.  Allergies:  No Known Allergies      Home Medications:    Hospital Medications:  aspirin enteric coated 81 milliGRAM(s) Oral daily  atorvastatin 40 milliGRAM(s) Oral at bedtime  chlorhexidine 2% Cloths 1 Application(s) Topical <User Schedule>  dorzolamide 2% Ophthalmic Solution 1 Drop(s) Both EYES three times a day  fludroCORTISONE 0.1 milliGRAM(s) Oral daily  fluticasone propionate 50 MICROgram(s)/spray Nasal Spray 1 Spray(s) Both Nostrils two times a day  folic acid 1 milliGRAM(s) Oral daily  glycopyrrolate 1 milliGRAM(s) Oral daily  heparin   Injectable 5000 Unit(s) SubCutaneous every 8 hours  latanoprost 0.005% Ophthalmic Solution 1 Drop(s) Both EYES at bedtime  loratadine 10 milliGRAM(s) Oral daily  midodrine. 5 milliGRAM(s) Oral three times a day  multivitamin 1 Tablet(s) Oral daily  thiamine 100 milliGRAM(s) Oral daily      PMHX/PSHX:  H/O orthostatic hypotension    CVA (cerebrovascular accident)    History of hip surgery        Family history:  No pertinent family history in first degree relatives        Social History:   Denies ethanol use.  Denies illicit drug use.    ROS:     General:  No wt loss, fevers, chills, night sweats, fatigue,   Eyes:  Good vision, no reported pain  ENT:  No sore throat, pain, runny nose, dysphagia  CV:  No pain, palpitations, hypo/hypertension  Resp:  No dyspnea, cough, tachypnea, wheezing  GI:  See HPI  :  No pain, bleeding, incontinence, nocturia  Muscle:  No pain, weakness  Neuro:  No weakness, tingling, memory problems  Psych:  No fatigue, insomnia, mood problems, depression  Endocrine:  No polyuria, polydipsia, cold/heat intolerance  Heme:  No petechiae, ecchymosis, easy bruisability  Integumentary:  No rash, edema      PHYSICAL EXAM:     GENERAL:  Appears stated age, well-groomed, well-nourished, no distress  HEENT:  NC/AT,  conjunctivae anicteric, clear and pink,   NECK: supple, trachea midline  CHEST:  Full & symmetric excursion, no increased effort, breath sounds clear  HEART:  Regular rhythm, no JVD  ABDOMEN:  Soft, non-tender, non-distended, normoactive bowel sounds,  no masses , no hepatosplenomegaly  EXTREMITIES:  no cyanosis,clubbing or edema  SKIN:  No rash, erythema, or, ecchymoses, no jaundice  NEURO:  Alert, non-focal, no asterixis  PSYCH: Appropriate affect, oriented to place and time  RECTAL: Deferred      Vital Signs:  Vital Signs Last 24 Hrs  T(C): 36.5 (2022 11:04), Max: 36.9 (2022 20:05)  T(F): 97.7 (2022 11:04), Max: 98.5 (2022 20:05)  HR: 68 (2022 17:11) (62 - 71)  BP: 126/72 (2022 17:11) (126/72 - 165/76)  BP(mean): --  RR: 18 (2022 11:04) (17 - 18)  SpO2: 96% (2022 11:04) (95% - 96%)    Parameters below as of 2022 11:04  Patient On (Oxygen Delivery Method): room air      Daily     Daily Weight in k.5 (2022 07:28)    LABS: Labs personally reviewed by me:                    Imaging personally reviewed by me:

## 2022-11-06 NOTE — PROGRESS NOTE ADULT - SUBJECTIVE AND OBJECTIVE BOX
Date of service: 22 @ 19:45      Patient is a 82y old  Male who presents with a chief complaint of CVA (2022 16:59)                                                               INTERVAL HPI/OVERNIGHT EVENTS:    REVIEW OF SYSTEMS:     CONSTITUTIONAL: No weakness, fevers or chills  EYES/ENT: No visual changes , no ear ache   NECK: No pain or stiffness  RESPIRATORY: No cough, wheezing,  No shortness of breath  CARDIOVASCULAR: No chest pain or palpitations  GASTROINTESTINAL: No abdominal pain  . No nausea, vomiting, or hematemesis; No diarrhea or constipation. No melena or hematochezia.  GENITOURINARY: No dysuria, frequency or hematuria  NEUROLOGICAL: No numbness or weakness  SKIN: No itching, burning, rashes, or lesions                                                                                                                                                                                                                                                                                 Medications:  MEDICATIONS  (STANDING):  aspirin enteric coated 81 milliGRAM(s) Oral daily  atorvastatin 40 milliGRAM(s) Oral at bedtime  chlorhexidine 2% Cloths 1 Application(s) Topical <User Schedule>  dorzolamide 2% Ophthalmic Solution 1 Drop(s) Both EYES three times a day  fludroCORTISONE 0.1 milliGRAM(s) Oral daily  fluticasone propionate 50 MICROgram(s)/spray Nasal Spray 1 Spray(s) Both Nostrils two times a day  folic acid 1 milliGRAM(s) Oral daily  glycopyrrolate 1 milliGRAM(s) Oral daily  heparin   Injectable 5000 Unit(s) SubCutaneous every 8 hours  latanoprost 0.005% Ophthalmic Solution 1 Drop(s) Both EYES at bedtime  loratadine 10 milliGRAM(s) Oral daily  midodrine. 5 milliGRAM(s) Oral three times a day  multivitamin 1 Tablet(s) Oral daily  thiamine 100 milliGRAM(s) Oral daily    MEDICATIONS  (PRN):       Allergies    No Known Allergies    Intolerances      Vital Signs Last 24 Hrs  T(C): 36.5 (2022 11:04), Max: 36.9 (2022 20:05)  T(F): 97.7 (2022 11:04), Max: 98.5 (2022 20:05)  HR: 68 (2022 17:11) (62 - 71)  BP: 126/72 (2022 17:11) (126/72 - 165/76)  BP(mean): --  RR: 18 (2022 11:04) (17 - 18)  SpO2: 96% (2022 11:04) (95% - 96%)    Parameters below as of 2022 11:04  Patient On (Oxygen Delivery Method): room air      CAPILLARY BLOOD GLUCOSE           @ 07:01  -   @ 19:45  --------------------------------------------------------  IN: 480 mL / OUT: 0 mL / NET: 480 mL      Physical Exam:    Daily     Daily Weight in k.5 (2022 07:28)  General:  Well appearing, NAD, not cachetic  HEENT:  Nonicteric, PERRLA  CV:  RRR, S1S2   Lungs:  CTA B/L, no wheezes, rales, rhonchi  Abdomen:  Soft, non-tender, no distended, positive BS  Extremities:  2+ pulses, no c/c, no edema  Skin:  Warm and dry, no rashes  :  No [place]  Neuro:  AAOx3, non-focal, grossly intact                                                                                                                                                                                                                                                                                                LABS:                                                     RADIOLOGY & ADDITIONAL TESTS         I personally reviewed: [  ]EKG   [  ]CXR    [  ] CT      A/P:         Discussed with :     [place] consultants' Notes   Time spent :

## 2022-11-06 NOTE — PROGRESS NOTE ADULT - ASSESSMENT
81yo M pmhx of orthostatics Hypotension , Edema , HLD comes to ED w/ slurred speech, difficulty swallowing,  worsening over the past month.   overall sx started one month ago and pt was seen by neuro Dr. Navya Roblero..MRI per daughter showed "multiple strokes "   he was seen and worked up by cardio inclduing echo and 24 hr holter but jarquin "was negative "    Denies chest pain, SOB, fall, trauma, n/v/d, sick contacts, urinary symptoms.  reports cough to solids   has difficulty swallowing but Patient denies hx of CHF however takes lasix regularly for peripheral edema?     - Slurred speech/ dysphagia:  neuro checks   neuro consult noted : likley old infarcts ..no need for further eval   cardio input     - dysphagia : d/w S/S : failed MBS   consult GI : refusing Peg   cont diet as ordered       - cirrhosis on CT :US noted   pt with hx of cirrhosis     - edema:? sec t chf +/- florinef   dc lasix    orthostatic hypotension : midodrine and florinef    HTN urgency : increase  lisinopril   maintain -160

## 2022-11-06 NOTE — CONSULT NOTE ADULT - ASSESSMENT
81yo M with HTN, HLD, prior strokes, orthostatics hypotension follows with neuro Dr. Navya Roblero p/w generalized weakness, slurred speech, difficulty swallowing,  worsening over the past month but has been worsening for the last year.  started asa 2 weeks ago after MRI showed chronic appearing infarcts/ neuro called for evaluation.    MRI brain 10/5/22: FLAIR changes c/w small vessel disease.  multiple chornic tiny bilateral cerebellar infarcts. small left frontal DVA; chronic post fossa subdural hygroma 6mm   MRA H unremarkable   + orthostasis   TSH WNL   CTH no acute findings   TTE done   o/e AAOx2-3, FISCHER     Impression:   1) Strokes are chronic, no acute strokes on MRI --> B/L cerebellar can contribute to unsteady gait   2) weakness and mental status changes over the last year likely 2/2 neurodegenerative d/o   3) orthostasis     - severely  orthostatics BP lying 169/78--> standing 112/70 --> on midodrine.  compression stockings   -  ASA 81mg PO daily.  - High dose statin therapy - atorvastatin 40mg PO daily. LDL goal <70mg/dL.  - CD    - eeg in or outpatient   - Hemoglobin A1c and lipid panel  - cardio recs appreciated   - telemetry  - RPR, TSH, B12   - PT/OT/SS/SLP, OOBC  - check FS, glucose control <180  - GI/DVT ppx  - Counseling on diet, exercise, and medication adherence was done  - Counseling on smoking cessation and alcohol consumption offered when appropriate.  - Pain assessed and judicious use of narcotics when appropriate was discussed.    - Stroke education given when appropriate.  - Importance of fall prevention discussed.   - Differential diagnosis and plan of care discussed with patient and/or family and primary team  - Thank you for allowing me to participate in the care of this patient. Call with questions.   - spoke with daughter at bedside   Lonnie Guaman MD  Vascular Neurology  Office: [phone] 
82 year old man with possible CVA, orthostatics  -hepatitis panel, inr ordered to evaluate cirrhosis  -pt understands risk of aspiration, but he is not interested in PEG tube at this time.  -orthostatic management per medicine/cardiology  -outpt GI follow up        Advanced care planning forms were discussed. Code status including forceful chest compressions, defibrillation and intubation were discussed. The risks benefits and alternatives to pertinent gastrointestinal procedures and interventions were discussed in detail and all questions were answered. Duration: 15 Minutes.    [facility]  oJse Lau M.D.   [address] [place], [place]  Office: [phone]  
83yo M pmhx of HTN, HLD comes to ED w/ slurred speech, difficulty swallowing, worsening over the past month. Pt had MBS done today which demonstrated gross aspiration of all textures. Indirect laryngoscopy performed today showed pooling of secretions with gross aspiration, b/v vocal cords mobile with good contact however noted to have poor excursion, also noted a small benign appearing cyst in the right vallecular space. 
81yo M pmhx of HTN, HLD comes to ED w/ slurred speech, difficulty swallowing, worsening over the past month.

## 2022-11-06 NOTE — PROGRESS NOTE ADULT - SUBJECTIVE AND OBJECTIVE BOX
Subjective: Patient seen and examined. No new events except as noted.     REVIEW OF SYSTEMS:    CONSTITUTIONAL:+ weakness, fevers or chills  EYES/ENT: No visual changes;  No vertigo or throat pain   NECK: No pain or stiffness  RESPIRATORY: No cough, wheezing, hemoptysis; No shortness of breath  CARDIOVASCULAR: No chest pain or palpitations  GASTROINTESTINAL: No abdominal or epigastric pain. No nausea, vomiting, or hematemesis; No diarrhea or constipation. No melena or hematochezia.  GENITOURINARY: No dysuria, frequency or hematuria  NEUROLOGICAL: No numbness or weakness  SKIN: No itching, burning, rashes, or lesions   All other review of systems is negative unless indicated above.    MEDICATIONS:  MEDICATIONS  (STANDING):  aspirin enteric coated 81 milliGRAM(s) Oral daily  atorvastatin 40 milliGRAM(s) Oral at bedtime  chlorhexidine 2% Cloths 1 Application(s) Topical <User Schedule>  dorzolamide 2% Ophthalmic Solution 1 Drop(s) Both EYES three times a day  fludroCORTISONE 0.1 milliGRAM(s) Oral daily  folic acid 1 milliGRAM(s) Oral daily  heparin   Injectable 5000 Unit(s) SubCutaneous every 8 hours  latanoprost 0.005% Ophthalmic Solution 1 Drop(s) Both EYES at bedtime  lisinopril 2.5 milliGRAM(s) Oral two times a day  midodrine. 5 milliGRAM(s) Oral three times a day  multivitamin 1 Tablet(s) Oral daily  thiamine 100 milliGRAM(s) Oral daily      PHYSICAL EXAM:  T(C): 36.5 (11-06-22 @ 04:20), Max: 36.9 (11-05-22 @ 12:30)  HR: 69 (11-06-22 @ 04:20) (64 - 71)  BP: 162/74 (11-06-22 @ 04:20) (149/75 - 187/81)  RR: 18 (11-06-22 @ 04:20) (17 - 18)  SpO2: 95% (11-06-22 @ 04:20) (94% - 96%)  Wt(kg): --  I&O's Summary        Appearance: NAD  HEENT:   Dry oral mucosa, PERRL, EOMI	  Lymphatic: No lymphadenopathy , no edema  Cardiovascular: Normal S1 S2, No JVD, No murmurs , Peripheral pulses palpable 2+ bilaterally  Respiratory: Lungs clear to auscultation, normal effort 	  Gastrointestinal:  Soft, Non-tender, + BS	  Skin: No rashes, No ecchymoses, No cyanosis, warm to touch  Musculoskeletal: Normal range of motion, normal strength  Psychiatry:  Mood & affect appropriate  Ext: No edema      LABS:    CARDIAC MARKERS:                  proBNP:   Lipid Profile:   HgA1c:   TSH:             TELEMETRY: 	  SR 60-80  ECG:  	  RADIOLOGY:   DIAGNOSTIC TESTING:  [ ] Echocardiogram:  [ ]  Catheterization:  [ ] Stress Test:    OTHER:

## 2022-11-06 NOTE — PROGRESS NOTE ADULT - ASSESSMENT
83yo M with HTN, HLD, prior strokes, orthostatics hypotension follows with neuro Dr. Navya Roblero p/w generalized weakness, slurred speech, difficulty swallowing,  worsening over the past month but has been worsening for the last year.  started asa 2 weeks ago after MRI showed chronic appearing infarcts/ neuro called for evaluation.    MRI brain 10/5/22: FLAIR changes c/w small vessel disease.  multiple chornic tiny bilateral cerebellar infarcts. 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 --> refusing feeding tube, now on diet   - would alsoi consider sending Myasthenia labs to look for alternative explanation of dysphagia.  send Ach Abs (binding, blocking, modulating) and also MUSK Ab   - may also benefit from FDG PET brain imaging to look for hypometabolism in brainstem, putamen    - severely  orthostatics BP lying 169/78--> standing 112/70 --> on midodrine.  compression stockings ; fludrocortisone added. trend orthostatics  -  ASA 81mg PO daily.  - High dose statin therapy - atorvastatin 40mg PO daily. LDL goal <70mg/dL.  - CD    - eeg in or outpatient   - cardio recs appreciated   - telemetry  - RPR, TSH, B12   - PT/OT/SS/SLP, OOBC  - check FS, glucose control <180  - GI/DVT ppx  - Thank you for allowing me to participate in the care of this patient. Call with questions.   - spoke with daughter at bedside   Lonnie Guaman MD  Vascular Neurology  Office: [phone]

## 2022-11-06 NOTE — PROGRESS NOTE ADULT - SUBJECTIVE AND OBJECTIVE BOX
Neurology Progress Note    S: Patient seen and examined. No new events overnight. failed SS but on diet, refusing feeding tube     Medication:    MEDICATIONS  (STANDING):  aspirin enteric coated 81 milliGRAM(s) Oral daily  atorvastatin 40 milliGRAM(s) Oral at bedtime  chlorhexidine 2% Cloths 1 Application(s) Topical <User Schedule>  dorzolamide 2% Ophthalmic Solution 1 Drop(s) Both EYES three times a day  fludroCORTISONE 0.1 milliGRAM(s) Oral daily  folic acid 1 milliGRAM(s) Oral daily  heparin   Injectable 5000 Unit(s) SubCutaneous every 8 hours  latanoprost 0.005% Ophthalmic Solution 1 Drop(s) Both EYES at bedtime  midodrine. 5 milliGRAM(s) Oral three times a day  multivitamin 1 Tablet(s) Oral daily  thiamine 100 milliGRAM(s) Oral daily    MEDICATIONS  (PRN):        Vitals:  Vital Signs Last 24 Hrs  T(C): 36.5 (11-06-22 @ 04:20), Max: 36.9 (11-05-22 @ 12:30)  T(F): 97.7 (11-06-22 @ 04:20), Max: 98.5 (11-05-22 @ 20:05)  HR: 69 (11-06-22 @ 04:20) (64 - 71)  BP: 162/74 (11-06-22 @ 04:20) (149/75 - 187/81)  BP(mean): --  RR: 18 (11-06-22 @ 04:20) (17 - 18)  SpO2: 95% (11-06-22 @ 04:20) (94% - 96%)    Orthostatic VS  11-05-22 @ 12:30  Lying BP: 180/84 HR: 68  Sitting BP: 158/76 HR: 72  Standing BP: 125/76 HR: 76  Site: upper left arm  Mode: electronic      Parameters below as of 05 Nov 2022 09:25  Patient On (Oxygen Delivery Method): room air    Orthostatic VS    11-04-22 @ 10:02  Lying BP: 178/76 HR: 67   Sitting BP: 151/73 HR: 70  Standing BP: 136/75 HR: 76  Site: --   Mode: --      General Exam:   General Appearance: Appropriately dressed and in no acute distress       Head: Normocephalic, atraumatic and no dysmorphic features  Ear, Nose, and Throat: Moist mucous membranes  CVS: S1S2+  Resp: No SOB, no wheeze or rhonchi  Abd: soft NTND  Extremities: No edema, no cyanosis  Skin: No bruises, no rashes     Neurological Exam:  Mental Status: Awake, alert and oriented x 2.  Able to follow simple and complex verbal commands. Able to name and repeat. fluent speech. No obvious aphasia or dysarthria noted.   Cranial Nerves: PERRL, EOMI, VFFC, sensati-3 on V1-V3 intact,  no obvious facial asymmetry , equal elevation of palate, scm/trap 5/5, tongue is midline on protrusion. no obvious papilledema on fundoscopic exam. Hearing is grossly intact.   Motor: Normal bulk, tone and strength throughout. Fine finger movements were intact and symmetric. no tremors or drift noted.    Sensation: Intact to light touch and pinprick throughout. no right/left confusion. no extinction to tactile on DSS.   Reflexes: 1+ throughout at biceps, brachioradialis, triceps, patellars and ankles bilaterally and equal. No clonus. R toe and L toe were both downgoing.  Coordination: No dysmetria on FNF    Gait: unsteady     I personally reviewed the below data/images/labs:      < from: CT Head No Cont (11.01.22 @ 18:40) >    ACC: [id number] EXAM:  CT BRAIN                          PROCEDURE DATE:  11/01/2022          INTERPRETATION:  CLINICAL INDICATION: Worsening dysphagia    TECHNIQUE: Noncontrast CT of the head was performed.    Multiple contiguous axial images were acquired from the skull base to the   vertex without the administration of intravenous contrast. Coronal and   sagittal reformations were made.    COMPARISON: None.    FINDINGS:  Mild prominence of the sulci and ventricles are consistent with   age-appropriate volume loss. There is no intraparenchymal hematoma, mass   effect or midline shift. The basal cisterns are patent.  No abnormal   extra-axial fluid collections are present.    Mild mucosal thickening of the bilateral ethmoid and sphenoid sinuses.   The mastoid air cells and middle ear cavities are clear. The intraorbital   compartments are unremarkable.    The soft tissues of the scalp are unremarkable. The calvarium is intact.      IMPRESSION:    No acute hemorrhage or mass effect.    --- End of Report ---           JORDAN CHRISTOPHER MD; Resident Radiology  This document has been electronically signed.  KADI MONIQUE MD; Attending Radiologist  This document has been electronically signed. Nov 1 2022  7:30PM    < end of copied text >  < from: Xray Cinesophagram Swallow Function w/ Contrast (11.04.22 @ 11:47) >    ACC: [id number] EXAM:  XR SWAL FUNC NICKY VID CON STDY                          PROCEDURE DATE:  11/04/2022          INTERPRETATION:  CLINICAL INFORMATION: Dysphagia.    TECHNIQUE: A cinesophagram was performed under fluoroscopy in conjunction   with speech pathology.    FLUOROSCOPY TIME: 112 seconds.      IMPRESSION:    Laryngeal penetration and aspiration.    For further information and recommendations, please refer to the speech   pathologist's final report, which is available for review in the   electronic medical record.    --- End of Report ---          ZULEMA STEWART MD; Resident Radiologist  This document has been electronically signed.  BIBI MORLEY MD; Attending Radiologist  This document has been electronically signed. Nov 4 2022  4:34PM    < end of copied text >

## 2022-11-07 ENCOUNTER — TRANSCRIPTION ENCOUNTER (OUTPATIENT)
Age: 82
End: 2022-11-07

## 2022-11-07 VITALS
HEART RATE: 78 BPM | OXYGEN SATURATION: 97 % | RESPIRATION RATE: 18 BRPM | SYSTOLIC BLOOD PRESSURE: 133 MMHG | DIASTOLIC BLOOD PRESSURE: 79 MMHG

## 2022-11-07 LAB
APTT BLD: 33.8 SEC — SIGNIFICANT CHANGE UP (ref 27.5–35.5)
BILIRUB DIRECT SERPL-MCNC: 0.5 MG/DL — HIGH (ref 0–0.3)
BILIRUB INDIRECT FLD-MCNC: 1.1 MG/DL — HIGH (ref 0.2–1)
BILIRUB SERPL-MCNC: 1.6 MG/DL — HIGH (ref 0.2–1.2)
HAV IGM SER-ACNC: SIGNIFICANT CHANGE UP
HBV CORE IGM SER-ACNC: SIGNIFICANT CHANGE UP
HBV SURFACE AG SER-ACNC: SIGNIFICANT CHANGE UP
HCV AB S/CO SERPL IA: 0.07 S/CO — SIGNIFICANT CHANGE UP (ref 0–0.99)
HCV AB SERPL-IMP: SIGNIFICANT CHANGE UP
INR BLD: 1.26 RATIO — HIGH (ref 0.88–1.16)
PROTHROM AB SERPL-ACNC: 14.5 SEC — HIGH (ref 10.5–13.4)

## 2022-11-07 PROCEDURE — 85018 HEMOGLOBIN: CPT

## 2022-11-07 PROCEDURE — 83036 HEMOGLOBIN GLYCOSYLATED A1C: CPT

## 2022-11-07 PROCEDURE — 82803 BLOOD GASES ANY COMBINATION: CPT

## 2022-11-07 PROCEDURE — 85025 COMPLETE CBC W/AUTO DIFF WBC: CPT

## 2022-11-07 PROCEDURE — 85730 THROMBOPLASTIN TIME PARTIAL: CPT

## 2022-11-07 PROCEDURE — 86043 ACETYLCHOLN RCPTR MODLG ANTB: CPT

## 2022-11-07 PROCEDURE — 84132 ASSAY OF SERUM POTASSIUM: CPT

## 2022-11-07 PROCEDURE — 93306 TTE W/DOPPLER COMPLETE: CPT

## 2022-11-07 PROCEDURE — 83880 ASSAY OF NATRIURETIC PEPTIDE: CPT

## 2022-11-07 PROCEDURE — 84100 ASSAY OF PHOSPHORUS: CPT

## 2022-11-07 PROCEDURE — 95816 EEG AWAKE AND DROWSY: CPT | Mod: 26

## 2022-11-07 PROCEDURE — 97161 PT EVAL LOW COMPLEX 20 MIN: CPT

## 2022-11-07 PROCEDURE — 92610 EVALUATE SWALLOWING FUNCTION: CPT

## 2022-11-07 PROCEDURE — 82248 BILIRUBIN DIRECT: CPT

## 2022-11-07 PROCEDURE — 82947 ASSAY GLUCOSE BLOOD QUANT: CPT

## 2022-11-07 PROCEDURE — 86041 ACETYLCHOLN RCPTR BNDNG ANTB: CPT

## 2022-11-07 PROCEDURE — 96376 TX/PRO/DX INJ SAME DRUG ADON: CPT

## 2022-11-07 PROCEDURE — 71045 X-RAY EXAM CHEST 1 VIEW: CPT

## 2022-11-07 PROCEDURE — 86042 ACETYLCHOLN RCPTR BLCKG ANTB: CPT

## 2022-11-07 PROCEDURE — 94640 AIRWAY INHALATION TREATMENT: CPT

## 2022-11-07 PROCEDURE — 86366 MUSCLE-SPECIFIC KINASE ANTB: CPT

## 2022-11-07 PROCEDURE — 36415 COLL VENOUS BLD VENIPUNCTURE: CPT

## 2022-11-07 PROCEDURE — 80074 ACUTE HEPATITIS PANEL: CPT

## 2022-11-07 PROCEDURE — 83605 ASSAY OF LACTIC ACID: CPT

## 2022-11-07 PROCEDURE — 85014 HEMATOCRIT: CPT

## 2022-11-07 PROCEDURE — 84484 ASSAY OF TROPONIN QUANT: CPT

## 2022-11-07 PROCEDURE — 82607 VITAMIN B-12: CPT

## 2022-11-07 PROCEDURE — 87637 SARSCOV2&INF A&B&RSV AMP PRB: CPT

## 2022-11-07 PROCEDURE — 84443 ASSAY THYROID STIM HORMONE: CPT

## 2022-11-07 PROCEDURE — 92611 MOTION FLUOROSCOPY/SWALLOW: CPT

## 2022-11-07 PROCEDURE — 74230 X-RAY XM SWLNG FUNCJ C+: CPT

## 2022-11-07 PROCEDURE — 87640 STAPH A DNA AMP PROBE: CPT

## 2022-11-07 PROCEDURE — 82746 ASSAY OF FOLIC ACID SERUM: CPT

## 2022-11-07 PROCEDURE — 76705 ECHO EXAM OF ABDOMEN: CPT

## 2022-11-07 PROCEDURE — 82330 ASSAY OF CALCIUM: CPT

## 2022-11-07 PROCEDURE — 99285 EMERGENCY DEPT VISIT HI MDM: CPT

## 2022-11-07 PROCEDURE — 95816 EEG AWAKE AND DROWSY: CPT

## 2022-11-07 PROCEDURE — 71250 CT THORAX DX C-: CPT | Mod: MA

## 2022-11-07 PROCEDURE — 87641 MR-STAPH DNA AMP PROBE: CPT

## 2022-11-07 PROCEDURE — 85610 PROTHROMBIN TIME: CPT

## 2022-11-07 PROCEDURE — 80053 COMPREHEN METABOLIC PANEL: CPT

## 2022-11-07 PROCEDURE — 80061 LIPID PANEL: CPT

## 2022-11-07 PROCEDURE — 82435 ASSAY OF BLOOD CHLORIDE: CPT

## 2022-11-07 PROCEDURE — 70450 CT HEAD/BRAIN W/O DYE: CPT | Mod: MA

## 2022-11-07 PROCEDURE — 96374 THER/PROPH/DIAG INJ IV PUSH: CPT

## 2022-11-07 PROCEDURE — 83735 ASSAY OF MAGNESIUM: CPT

## 2022-11-07 PROCEDURE — 84295 ASSAY OF SERUM SODIUM: CPT

## 2022-11-07 PROCEDURE — 80048 BASIC METABOLIC PNL TOTAL CA: CPT

## 2022-11-07 PROCEDURE — 82247 BILIRUBIN TOTAL: CPT

## 2022-11-07 PROCEDURE — 97116 GAIT TRAINING THERAPY: CPT

## 2022-11-07 PROCEDURE — 97530 THERAPEUTIC ACTIVITIES: CPT

## 2022-11-07 RX ORDER — LISINOPRIL 2.5 MG/1
1 TABLET ORAL
Qty: 0 | Refills: 0 | DISCHARGE

## 2022-11-07 RX ORDER — FUROSEMIDE 40 MG
1 TABLET ORAL
Qty: 0 | Refills: 0 | DISCHARGE

## 2022-11-07 RX ORDER — MIDODRINE HYDROCHLORIDE 2.5 MG/1
1 TABLET ORAL
Qty: 0 | Refills: 0 | DISCHARGE

## 2022-11-07 RX ORDER — LORATADINE 10 MG/1
1 TABLET ORAL
Qty: 0 | Refills: 0 | DISCHARGE
Start: 2022-11-07

## 2022-11-07 RX ORDER — FLUTICASONE PROPIONATE 50 MCG
1 SPRAY, SUSPENSION NASAL
Qty: 0 | Refills: 0 | DISCHARGE
Start: 2022-11-07

## 2022-11-07 RX ORDER — FLUDROCORTISONE ACETATE 0.1 MG/1
1 TABLET ORAL
Qty: 0 | Refills: 0 | DISCHARGE

## 2022-11-07 RX ORDER — LISINOPRIL 2.5 MG/1
1 TABLET ORAL
Qty: 30 | Refills: 0
Start: 2022-11-07 | End: 2022-12-06

## 2022-11-07 RX ORDER — THIAMINE MONONITRATE (VIT B1) 100 MG
1 TABLET ORAL
Qty: 0 | Refills: 0 | DISCHARGE
Start: 2022-11-07

## 2022-11-07 RX ORDER — SENNA PLUS 8.6 MG/1
2 TABLET ORAL
Qty: 0 | Refills: 0 | DISCHARGE
Start: 2022-11-07

## 2022-11-07 RX ORDER — FOLIC ACID 0.8 MG
1 TABLET ORAL
Qty: 0 | Refills: 0 | DISCHARGE
Start: 2022-11-07

## 2022-11-07 RX ADMIN — HEPARIN SODIUM 5000 UNIT(S): 5000 INJECTION INTRAVENOUS; SUBCUTANEOUS at 05:34

## 2022-11-07 RX ADMIN — HEPARIN SODIUM 5000 UNIT(S): 5000 INJECTION INTRAVENOUS; SUBCUTANEOUS at 14:41

## 2022-11-07 RX ADMIN — Medication 1 MILLIGRAM(S): at 12:39

## 2022-11-07 RX ADMIN — Medication 81 MILLIGRAM(S): at 12:39

## 2022-11-07 RX ADMIN — LORATADINE 10 MILLIGRAM(S): 10 TABLET ORAL at 12:40

## 2022-11-07 RX ADMIN — DORZOLAMIDE HYDROCHLORIDE 1 DROP(S): 20 SOLUTION/ DROPS OPHTHALMIC at 05:33

## 2022-11-07 RX ADMIN — Medication 1 TABLET(S): at 12:39

## 2022-11-07 RX ADMIN — ROBINUL 1 MILLIGRAM(S): 0.2 INJECTION INTRAMUSCULAR; INTRAVENOUS at 12:40

## 2022-11-07 RX ADMIN — Medication 100 MILLIGRAM(S): at 12:39

## 2022-11-07 RX ADMIN — FLUDROCORTISONE ACETATE 0.1 MILLIGRAM(S): 0.1 TABLET ORAL at 05:33

## 2022-11-07 RX ADMIN — MIDODRINE HYDROCHLORIDE 5 MILLIGRAM(S): 2.5 TABLET ORAL at 12:51

## 2022-11-07 RX ADMIN — DORZOLAMIDE HYDROCHLORIDE 1 DROP(S): 20 SOLUTION/ DROPS OPHTHALMIC at 14:40

## 2022-11-07 RX ADMIN — CHLORHEXIDINE GLUCONATE 1 APPLICATION(S): 213 SOLUTION TOPICAL at 05:33

## 2022-11-07 RX ADMIN — Medication 1 SPRAY(S): at 05:34

## 2022-11-07 NOTE — PROVIDER CONTACT NOTE (OTHER) - RECOMMENDATIONS
Notify provider. Manual checked. Pt does not want any tylenol for pain. Stated he already spoke with Dr. Conde regarding pain.
BP Medications?
recheck vitals?
no oral medications

## 2022-11-07 NOTE — PROGRESS NOTE ADULT - SUBJECTIVE AND OBJECTIVE BOX
Chief Complaint:  Patient is a 82y old  Male who presents with a chief complaint of CVA (2022 16:59)      Date of service 22 @ 13:41      Interval Events:   Patient seen and examined.   BM this morning. No abdominal pain, n/v.  Eager to be discharged.     Hospital Medications:  aspirin enteric coated 81 milliGRAM(s) Oral daily  atorvastatin 40 milliGRAM(s) Oral at bedtime  bisacodyl 5 milliGRAM(s) Oral at bedtime  chlorhexidine 2% Cloths 1 Application(s) Topical <User Schedule>  dorzolamide 2% Ophthalmic Solution 1 Drop(s) Both EYES three times a day  fludroCORTISONE 0.1 milliGRAM(s) Oral daily  fluticasone propionate 50 MICROgram(s)/spray Nasal Spray 1 Spray(s) Both Nostrils two times a day  folic acid 1 milliGRAM(s) Oral daily  glycopyrrolate 1 milliGRAM(s) Oral daily  heparin   Injectable 5000 Unit(s) SubCutaneous every 8 hours  latanoprost 0.005% Ophthalmic Solution 1 Drop(s) Both EYES at bedtime  loratadine 10 milliGRAM(s) Oral daily  melatonin 3 milliGRAM(s) Oral at bedtime  midodrine. 5 milliGRAM(s) Oral three times a day  multivitamin 1 Tablet(s) Oral daily  senna 2 Tablet(s) Oral at bedtime  thiamine 100 milliGRAM(s) Oral daily        Review of Systems:  General:  No wt loss, fevers, chills, night sweats, fatigue,   Eyes:  Good vision, no reported pain  ENT:  No sore throat, pain, runny nose, dysphagia  CV:  No pain, palpitations, hypo/hypertension  Resp:  No dyspnea, cough, tachypnea, wheezing  GI:  See HPI  :  No pain, bleeding, incontinence, nocturia  Muscle:  No pain, weakness  Neuro:  No weakness, tingling, memory problems  Psych:  No fatigue, insomnia, mood problems, depression  Endocrine:  No polyuria, polydipsia, cold/heat intolerance  Heme:  No petechiae, ecchymosis, easy bruisability  Integumentary:  No rash, edema    PHYSICAL EXAM:   Vital Signs:  Vital Signs Last 24 Hrs  T(C): 36.3 (2022 11:19), Max: 36.8 (2022 05:21)  T(F): 97.4 (2022 11:19), Max: 98.3 (2022 05:21)  HR: 78 (2022 12:47) (61 - 78)  BP: 133/79 (2022 12:47) (126/72 - 193/95)  BP(mean): --  RR: 18 (2022 12:47) (18 - 18)  SpO2: 97% (:47) (94% - 97%)    Parameters below as of 2022 12:47  Patient On (Oxygen Delivery Method): room air      Daily     Daily Weight in k.7 (2022 07:25)      PHYSICAL EXAM:     GENERAL:  Appears stated age, well-groomed, well-nourished, no distress  HEENT:  NC/AT,  conjunctivae anicteric, clear and pink,   NECK: supple, trachea midline  CHEST:  Full & symmetric excursion, no increased effort, breath sounds clear  HEART:  Regular rhythm, no JVD  ABDOMEN:  Soft, non-tender, non-distended, normoactive bowel sounds,  no masses , no hepatosplenomegaly  EXTREMITIES:  no cyanosis,clubbing or edema  SKIN:  No rash, erythema, or, ecchymoses, no jaundice  NEURO:  Alert, non-focal, no asterixis  PSYCH: Appropriate affect, oriented to place and time  RECTAL: Deferred      LABS Personally reviewed by me:          TPro  x   /  Alb  x   /  TBili  1.6<H>  /  DBili  0.5<H>  /  AST  x   /  ALT  x   /  AlkPhos  x   11-07      PT/INR - ( 2022 05:52 )   PT: 14.5 sec;   INR: 1.26 ratio         PTT - ( 2022 05:52 )  PTT:33.8 sec          Imaging personally reviewed by me:

## 2022-11-07 NOTE — DISCHARGE NOTE PROVIDER - HOSPITAL COURSE
" 81yo M with HTN, HLD, prior strokes, orthostatics hypotension follows with neuro Dr. Navya Roblero p/w generalized weakness, slurred speech, difficulty swallowing,  worsening over the past month but has been worsening for the last year.  started asa 2 weeks ago after MRI showed chronic appearing infarcts/ neuro called for evaluation.  MRI brain 10/5/22: FLAIR changes c/w small vessel disease.  multiple chornic tiny bilateral cerebellar infarcts. small left frontal DVA; chronic post fossa subdural hygroma 6mm . MRA H unremarkable . CTH no acute findings . 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  Per  Neurology  may also benefit from FDG PET brain imaging to look for hypometabolism in brainstem, putamen .  Pt failed SS eval  --> refusing feeding tube, now on diet . Pt with  severe  orthostatics BP lying 169/78--> standing 112/70 --> on midodrine.  compression stockings ; fludrocortisone added. continuing -  ASA 81mg PO daily,  High dose statin therapy - atorvastatin 40mg PO daily. seen by cardiology for orthostatic hypotension. OFF Lasix and Lisinopril. Allowing permissive hypertension. Discharged home with Home PT and speech therapy.

## 2022-11-07 NOTE — PROGRESS NOTE ADULT - PROVIDER SPECIALTY LIST ADULT
Internal Medicine
Gastroenterology
Neurology
Cardiology
Internal Medicine
Internal Medicine
Neurology
Neurology
Cardiology
Cardiology
Internal Medicine
Cardiology
Cardiology

## 2022-11-07 NOTE — PROGRESS NOTE ADULT - PROBLEM SELECTOR PROBLEM 1
Generalized weakness

## 2022-11-07 NOTE — PROVIDER CONTACT NOTE (OTHER) - ACTION/TREATMENT ORDERED:
provider wants to draw labs and then retake blood pressure. blood pressure was 125/68 on recheck.   provider ok with patient pulse oximetry being checked with Q4 vitals.
No oral medications
Provider at bedside. Rechecked bp. Bladder Scan x1. Continue to monitor bp. Pt Orthostatic + started on midodrine and florinef. Okay with bp 170-180. Bowel regimen ordered.
Provider ordered no intervention

## 2022-11-07 NOTE — PROVIDER CONTACT NOTE (OTHER) - BACKGROUND
Patient admitted for Dysphagia, patient is NPO
Patient admitted for Dysphagia
Pt admitted for Dysphagia
pt admitted for slurred speech and is a neuro Q4, vitals Q4 patient. pt is NPO.

## 2022-11-07 NOTE — DISCHARGE NOTE NURSING/CASE MANAGEMENT/SOCIAL WORK - NSDCPEFALRISK_GEN_ALL_CORE
For information on Fall & Injury Prevention, visit: [email or web address] OR  [email or web address] OR  https://www.cdc.gov/steadi/patient.html

## 2022-11-07 NOTE — PROVIDER CONTACT NOTE (OTHER) - REASON
Patient NPO, cannot give oral medications
Patient's BP is180/84
Pt bp elevated, c/o of 4/10 right lower abdominal pain
elevated BP/ not continuous pulse ox

## 2022-11-07 NOTE — PROGRESS NOTE ADULT - SUBJECTIVE AND OBJECTIVE BOX
Date of service: 22 @ 23:24      Patient is a 82y old  Male who presents with a chief complaint of CVA (2022 16:59)                                                               INTERVAL HPI/OVERNIGHT EVENTS:    REVIEW OF SYSTEMS:     CONSTITUTIONAL: No weakness, fevers or chills  EYES/ENT: No visual changes , no ear ache   NECK: No pain or stiffness  RESPIRATORY: No cough, wheezing,  No shortness of breath  CARDIOVASCULAR: No chest pain or palpitations  GASTROINTESTINAL: No abdominal pain  . No nausea, vomiting, or hematemesis; No diarrhea or constipation. No melena or hematochezia.  GENITOURINARY: No dysuria, frequency or hematuria  NEUROLOGICAL: No numbness or weakness  SKIN: No itching, burning, rashes, or lesions                                                                                                                                                                                                                                                                                 Medications:  MEDICATIONS  (STANDING):  aspirin enteric coated 81 milliGRAM(s) Oral daily  atorvastatin 40 milliGRAM(s) Oral at bedtime  bisacodyl 5 milliGRAM(s) Oral at bedtime  chlorhexidine 2% Cloths 1 Application(s) Topical <User Schedule>  dorzolamide 2% Ophthalmic Solution 1 Drop(s) Both EYES three times a day  fludroCORTISONE 0.1 milliGRAM(s) Oral daily  fluticasone propionate 50 MICROgram(s)/spray Nasal Spray 1 Spray(s) Both Nostrils two times a day  folic acid 1 milliGRAM(s) Oral daily  glycopyrrolate 1 milliGRAM(s) Oral daily  heparin   Injectable 5000 Unit(s) SubCutaneous every 8 hours  latanoprost 0.005% Ophthalmic Solution 1 Drop(s) Both EYES at bedtime  loratadine 10 milliGRAM(s) Oral daily  melatonin 3 milliGRAM(s) Oral at bedtime  midodrine. 5 milliGRAM(s) Oral three times a day  multivitamin 1 Tablet(s) Oral daily  senna 2 Tablet(s) Oral at bedtime  thiamine 100 milliGRAM(s) Oral daily    MEDICATIONS  (PRN):       Allergies    No Known Allergies    Intolerances      Vital Signs Last 24 Hrs  T(C): 36.3 (2022 11:19), Max: 36.8 (2022 05:21)  T(F): 97.4 (2022 11:19), Max: 98.3 (2022 05:21)  HR: 78 (2022 12:47) (61 - 78)  BP: 133/79 (2022 12:47) (133/79 - 162/78)  BP(mean): --  RR: 18 (2022 12:47) (18 - 18)  SpO2: 97% (2022 12:47) (94% - 97%)    Parameters below as of 2022 12:47  Patient On (Oxygen Delivery Method): room air      CAPILLARY BLOOD GLUCOSE           @ :  -   @ 07:00  --------------------------------------------------------  IN: 480 mL / OUT: 0 mL / NET: 480 mL     @ 07:  -   @ 23:24  --------------------------------------------------------  IN: 480 mL / OUT: 0 mL / NET: 480 mL      Physical Exam:    Daily     Daily Weight in k.7 (2022 07:25)  General:  Well appearing, NAD, not cachetic  HEENT:  Nonicteric, PERRLA  CV:  RRR, S1S2   Lungs:  CTA B/L, no wheezes, rales, rhonchi  Abdomen:  Soft, non-tender, no distended, positive BS  Extremities:  2+ pulses, no c/c, no edema  Skin:  Warm and dry, no rashes  :  No [place]  Neuro:  AAOx3, non-focal, grossly intact                                                                                                                                                                                                                                                                                                LABS:                                TPro  x   /  Alb  x   /  TBili  1.6<H>  /  DBili  0.5<H>  /  AST  x   /  ALT  x   /  AlkPhos  x                          RADIOLOGY & ADDITIONAL TESTS         I personally reviewed: [  ]EKG   [  ]CXR    [  ] CT      A/P:         Discussed with :     [place] consultants' Notes   Time spent :

## 2022-11-07 NOTE — PROGRESS NOTE ADULT - ASSESSMENT
83yo M with HTN, HLD, prior strokes, orthostatics hypotension follows with neuro Dr. Navya Roblero p/w generalized weakness, slurred speech, difficulty swallowing,  worsening over the past month but has been worsening for the last year.  started asa 2 weeks ago after MRI showed chronic appearing infarcts/ neuro called for evaluation.    MRI brain 10/5/22: FLAIR changes c/w small vessel disease.  multiple chornic tiny bilateral cerebellar infarcts. 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 --> refusing feeding tube, now on diet   - would alsoi consider sending Myasthenia labs to look for alternative explanation of dysphagia.  send Ach Abs (binding, blocking, modulating) and also MUSK Ab   - may also benefit from FDG PET brain imaging to look for hypometabolism in brainstem, putamen    - severely  orthostatics BP lying 169/78--> standing 112/70 --> on midodrine.  compression stockings ; fludrocortisone added. trend orthostatics  -  ASA 81mg PO daily.  - High dose statin therapy - atorvastatin 40mg PO daily. LDL goal <70mg/dL.  - CD    - eeg in or outpatient   - cardio recs appreciated   - telemetry  - RPR, TSH, B12  if not done   - PT/OT/SS/SLP, OOBC  - check FS, glucose control <180  - GI/DVT ppx  - Thank you for allowing me to participate in the care of this patient. Call with questions.   - spoke with daughter at bedside   Lonnie Guaman MD  Vascular Neurology  Office: [phone]

## 2022-11-07 NOTE — EEG REPORT - NS EEG TEXT BOX
JE AKERS [id number]     Study Date: 		11-07-22    --------------------------------------------------------------------------------------------------  History:  CC/ HPI Patient is a 82y old  Male who presents with a chief complaint of CVA (05 Nov 2022 16:59)    MEDICATIONS  (STANDING):  aspirin enteric coated 81 milliGRAM(s) Oral daily  atorvastatin 40 milliGRAM(s) Oral at bedtime  bisacodyl 5 milliGRAM(s) Oral at bedtime  chlorhexidine 2% Cloths 1 Application(s) Topical <User Schedule>  dorzolamide 2% Ophthalmic Solution 1 Drop(s) Both EYES three times a day  fludroCORTISONE 0.1 milliGRAM(s) Oral daily  fluticasone propionate 50 MICROgram(s)/spray Nasal Spray 1 Spray(s) Both Nostrils two times a day  folic acid 1 milliGRAM(s) Oral daily  glycopyrrolate 1 milliGRAM(s) Oral daily  heparin   Injectable 5000 Unit(s) SubCutaneous every 8 hours  latanoprost 0.005% Ophthalmic Solution 1 Drop(s) Both EYES at bedtime  loratadine 10 milliGRAM(s) Oral daily  melatonin 3 milliGRAM(s) Oral at bedtime  midodrine. 5 milliGRAM(s) Oral three times a day  multivitamin 1 Tablet(s) Oral daily  senna 2 Tablet(s) Oral at bedtime  thiamine 100 milliGRAM(s) Oral daily    --------------------------------------------------------------------------------------------------  Study Interpretation:    [Abbreviation Key:  PDR=alpha rhythm/posterior dominant rhythm. A-P=anterior posterior.  Amplitude: ‘very low’:<20; ‘low’:20-49; ‘medium’:; ‘high’:>150uV.  Persistence for periodic/rhythmic patterns (% of epoch) ‘rare’:<1%; ‘occasional’:1-10%; ‘frequent’:10-50%; ‘abundant’:50-90%; ‘continuous’:>90%.  Persistence for sporadic discharges: ‘rare’:<1/hr; ‘occasional’:1/min-1/hr; ‘frequent’:>1/min; ‘abundant’:>1/10 sec.  RPP=rhythmic and periodic patterns; GRDA=generalized rhythmic delta activity; FIRDA=frontal intermittent GRDA; LRDA=lateralized rhythmic delta activity; TIRDA=temporal intermittent rhythmic delta activity;  LPD=PLED=lateralized periodic discharges; GPD=generalized periodic discharges; BIPDs =bilateral independent periodic discharges; Mf=multifocal; SIRPDs=stimulus induced rhythmic, periodic, or ictal appearing discharges; BIRDs=brief potentially ictal rhythmic discharges >4 Hz, lasting .5-10s; PFA (paroxysmal bursts >13 Hz or =8 Hz <10s).  Modifiers: +F=with fast component; +S=with spike component; +R=with rhythmic component.  S-B=burst suppression pattern.  Max=maximal. N1-drowsy; N2-stage II sleep; N3-slow wave sleep. SSS/BETS=small sharp spikes/benign epileptiform transients of sleep. HV=hyperventilation; PS=photic stimulation]    Daily EEG Visual Analysis  FINDINGS:      Background:  Continuity: continuous  Symmetry: symmetric  PDR: 7 Hz activity, with amplitude to 40 uV, that attenuated to eye opening.    Reactivity: present  Voltage: normal (between 20-150uV)  Anterior Posterior Gradient: present  Other background findings: none  Breach: absent    Background Slowing:  Generalized slowing: diffuse irregular delta and theta activity.  Focal slowing: none was present.    State Changes:   -Drowsiness noted with increased slowing, attenuation of fast activity, vertex transients.  -N2 sleep transients were not recorded.    Sporadic Epileptiform Discharges:    None    Rhythmic and Periodic Patterns (RPPs):  None     Electrographic and Electroclinical seizures:  None    Other Clinical Events:  None    Activation Procedures:   -Hyperventilation was not performed.    -Photic stimulation was performed and did not elicit any abnormalities.       Artifacts:  Intermittent myogenic and movement artifacts were noted.    ECG:  The heart rate on single channel ECG was predominantly between 60 BPM.    EEG Classification / Summary:  Abnormal EEG study  MIld generalized background slowing    -----------------------------------------------------------------------------------------------------    Clinical Impression:  MIld diffuse/multifocal cerebral dysfunction, not specific as to etiology.  There were no epileptiform abnormalities recorded.      -------------------------------------------------------------------------------------------------------  Frank Chandler MD  Epilepsy Fellow , [facility]  Department of Neurology, [employer] [employer] [facility]    [facility] EEG Reading Room Ph#: [phone]  Epilepsy Answering Service after 5PM and before 8:30AM: Ph#: [phone]   JE AKERS [id number]     Study Date: 		11-07-22    --------------------------------------------------------------------------------------------------  History:  CC/ HPI Patient is a 82y old  Male who presents with a chief complaint of CVA (05 Nov 2022 16:59)    MEDICATIONS  (STANDING):  aspirin enteric coated 81 milliGRAM(s) Oral daily  atorvastatin 40 milliGRAM(s) Oral at bedtime  bisacodyl 5 milliGRAM(s) Oral at bedtime  chlorhexidine 2% Cloths 1 Application(s) Topical <User Schedule>  dorzolamide 2% Ophthalmic Solution 1 Drop(s) Both EYES three times a day  fludroCORTISONE 0.1 milliGRAM(s) Oral daily  fluticasone propionate 50 MICROgram(s)/spray Nasal Spray 1 Spray(s) Both Nostrils two times a day  folic acid 1 milliGRAM(s) Oral daily  glycopyrrolate 1 milliGRAM(s) Oral daily  heparin   Injectable 5000 Unit(s) SubCutaneous every 8 hours  latanoprost 0.005% Ophthalmic Solution 1 Drop(s) Both EYES at bedtime  loratadine 10 milliGRAM(s) Oral daily  melatonin 3 milliGRAM(s) Oral at bedtime  midodrine. 5 milliGRAM(s) Oral three times a day  multivitamin 1 Tablet(s) Oral daily  senna 2 Tablet(s) Oral at bedtime  thiamine 100 milliGRAM(s) Oral daily    --------------------------------------------------------------------------------------------------  Study Interpretation:    [Abbreviation Key:  PDR=alpha rhythm/posterior dominant rhythm. A-P=anterior posterior.  Amplitude: ‘very low’:<20; ‘low’:20-49; ‘medium’:; ‘high’:>150uV.  Persistence for periodic/rhythmic patterns (% of epoch) ‘rare’:<1%; ‘occasional’:1-10%; ‘frequent’:10-50%; ‘abundant’:50-90%; ‘continuous’:>90%.  Persistence for sporadic discharges: ‘rare’:<1/hr; ‘occasional’:1/min-1/hr; ‘frequent’:>1/min; ‘abundant’:>1/10 sec.  RPP=rhythmic and periodic patterns; GRDA=generalized rhythmic delta activity; FIRDA=frontal intermittent GRDA; LRDA=lateralized rhythmic delta activity; TIRDA=temporal intermittent rhythmic delta activity;  LPD=PLED=lateralized periodic discharges; GPD=generalized periodic discharges; BIPDs =bilateral independent periodic discharges; Mf=multifocal; SIRPDs=stimulus induced rhythmic, periodic, or ictal appearing discharges; BIRDs=brief potentially ictal rhythmic discharges >4 Hz, lasting .5-10s; PFA (paroxysmal bursts >13 Hz or =8 Hz <10s).  Modifiers: +F=with fast component; +S=with spike component; +R=with rhythmic component.  S-B=burst suppression pattern.  Max=maximal. N1-drowsy; N2-stage II sleep; N3-slow wave sleep. SSS/BETS=small sharp spikes/benign epileptiform transients of sleep. HV=hyperventilation; PS=photic stimulation]    Daily EEG Visual Analysis  FINDINGS:      Background:  Continuity: continuous  Symmetry: symmetric  PDR: 7 Hz activity, with amplitude to 40 uV, that attenuated to eye opening.    Reactivity: present  Voltage: normal (between 20-150uV)  Anterior Posterior Gradient: present  Other background findings: none  Breach: absent    Background Slowing:  Generalized slowing: diffuse irregular delta and theta activity.  Focal slowing: none was present.    State Changes:   -Drowsiness noted with increased slowing, attenuation of fast activity, vertex transients.  -N2 sleep transients were not recorded.    Sporadic Epileptiform Discharges:    None    Rhythmic and Periodic Patterns (RPPs):  None     Electrographic and Electroclinical seizures:  None    Other Clinical Events:  None    Activation Procedures:   -Hyperventilation was not performed.    -Photic stimulation was performed and did not elicit any abnormalities.       Artifacts:  Intermittent myogenic and movement artifacts were noted.    ECG:  The heart rate on single channel ECG was predominantly between 60 BPM.    EEG Classification / Summary:  Abnormal EEG study  MIld generalized background slowing    -----------------------------------------------------------------------------------------------------    Clinical Impression:  MIld diffuse/multifocal cerebral dysfunction, not specific as to etiology.  There were no epileptiform abnormalities recorded.      -------------------------------------------------------------------------------------------------------  Frank Chandler MD  Epilepsy Fellow , [facility]  Department of Neurology, [facility]    [facility] EEG Reading Room Ph#: [phone]  Epilepsy Answering Service after 5PM and before 8:30AM: Ph#: [phone]    Korey Gore MD  Neurology Attending Physician

## 2022-11-07 NOTE — DISCHARGE NOTE NURSING/CASE MANAGEMENT/SOCIAL WORK - PATIENT PORTAL LINK FT
You can access the FollowMyHealth Patient Portal offered by [facility] by registering at the following website: [email or web address]. By joining [facility]’s FollowMyHealth portal, you will also be able to view your health information using other applications (apps) compatible with our system.

## 2022-11-07 NOTE — DISCHARGE NOTE PROVIDER - NSDCCPCAREPLAN_GEN_ALL_CORE_FT
PRINCIPAL DISCHARGE DIAGNOSIS  Diagnosis: Dysphagia  Assessment and Plan of Treatment: YOu were seen by Neurology   you did not have an acute stroke  Follow up with Neurology for further testing to rule out other neuro degenerative conditions  You may benefit from FDG PET imaging of brain  continue speech/swalow  therapy      SECONDARY DISCHARGE DIAGNOSES  Diagnosis: Speech disturbance  Assessment and Plan of Treatment: Follow up with Neurology  continue speech therapy    Diagnosis: Orthostatic hypotension  Assessment and Plan of Treatment: continue current medications  change positions gradually, especially getting out of bed  Follow up with cardiology   You may use compression stockings when out of bed and remove when in bed

## 2022-11-07 NOTE — PROGRESS NOTE ADULT - ASSESSMENT
81yo M pmhx of orthostatics Hypotension , Edema , HLD comes to ED w/ slurred speech, difficulty swallowing,  worsening over the past month.   overall sx started one month ago and pt was seen by neuro Dr. Navya Roblero..MRI per daughter showed "multiple strokes "   he was seen and worked up by cardio inclduing echo and 24 hr holter but jarquin "was negative "    Denies chest pain, SOB, fall, trauma, n/v/d, sick contacts, urinary symptoms.  reports cough to solids   has difficulty swallowing but Patient denies hx of CHF however takes lasix regularly for peripheral edema?     - Slurred speech/ dysphagia:  neuro checks   neuro consult noted : likley old infarcts ..no need for further eval   cardio input     - dysphagia : d/w S/S : failed MBS   consult GI : refusing Peg   cont diet as ordered       - cirrhosis on CT :US noted   pt with hx of cirrhosis     - edema:? sec t chf +/- florinef   dc lasix    orthostatic hypotension : midodrine and florinef    HTN urgency : maintain -160   cont lisinopril ..d/w     pt is not sx     discussed HCP:PT is full code

## 2022-11-07 NOTE — PROGRESS NOTE ADULT - PROBLEM SELECTOR PLAN 1
slurred speech, dysphagia    - CTH - negative    - CT A/P - cirrohosis - abd U/S reviewed  TTE - EF 78%, min MR, false tendon in the LV cavity (normal variant)  ASA/Statin  monitor on tele   neuro checks  neuro following

## 2022-11-07 NOTE — PROGRESS NOTE ADULT - SUBJECTIVE AND OBJECTIVE BOX
Neurology Progress Note    S: Patient seen and examined. No new events overnight. failed SS but on diet, refusing feeding tube     Medication:    MEDICATIONS  (STANDING):  aspirin enteric coated 81 milliGRAM(s) Oral daily  atorvastatin 40 milliGRAM(s) Oral at bedtime  bisacodyl 5 milliGRAM(s) Oral at bedtime  chlorhexidine 2% Cloths 1 Application(s) Topical <User Schedule>  dorzolamide 2% Ophthalmic Solution 1 Drop(s) Both EYES three times a day  fludroCORTISONE 0.1 milliGRAM(s) Oral daily  fluticasone propionate 50 MICROgram(s)/spray Nasal Spray 1 Spray(s) Both Nostrils two times a day  folic acid 1 milliGRAM(s) Oral daily  glycopyrrolate 1 milliGRAM(s) Oral daily  heparin   Injectable 5000 Unit(s) SubCutaneous every 8 hours  latanoprost 0.005% Ophthalmic Solution 1 Drop(s) Both EYES at bedtime  loratadine 10 milliGRAM(s) Oral daily  melatonin 3 milliGRAM(s) Oral at bedtime  midodrine. 5 milliGRAM(s) Oral three times a day  multivitamin 1 Tablet(s) Oral daily  senna 2 Tablet(s) Oral at bedtime  thiamine 100 milliGRAM(s) Oral daily    MEDICATIONS  (PRN):        Vitals:  Vital Signs Last 24 Hrs  T(C): 36.3 (11-07-22 @ 11:19), Max: 36.8 (11-07-22 @ 05:21)  T(F): 97.4 (11-07-22 @ 11:19), Max: 98.3 (11-07-22 @ 05:21)  HR: 67 (11-07-22 @ 11:19) (61 - 68)  BP: 162/78 (11-07-22 @ 11:19) (126/72 - 193/95)  BP(mean): --  RR: 18 (11-07-22 @ 11:19) (18 - 18)  SpO2: 95% (11-07-22 @ 11:19) (94% - 96%)    Orthostatic VS  11-06-22 @ 11:04  Lying BP: 165/76 HR: 62  Sitting BP: 152/74 HR: 67  Standing BP: 144/76 HR: 67  Site: upper right arm  Mode: electronic  Orthostatic VS  11-05-22 @ 12:30  Lying BP: 180/84 HR: 68  Sitting BP: 158/76 HR: 72  Standing BP: 125/76 HR: 76  Site: upper left arm  Mode: electronic      Orthostatic VS  11-05-22 @ 12:30  Lying BP: 180/84 HR: 68  Sitting BP: 158/76 HR: 72  Standing BP: 125/76 HR: 76  Site: upper left arm  Mode: electronic      Parameters below as of 05 Nov 2022 09:25  Patient On (Oxygen Delivery Method): room air    Orthostatic VS    11-04-22 @ 10:02  Lying BP: 178/76 HR: 67   Sitting BP: 151/73 HR: 70  Standing BP: 136/75 HR: 76  Site: --   Mode: --      General Exam:   General Appearance: Appropriately dressed and in no acute distress       Head: Normocephalic, atraumatic and no dysmorphic features  Ear, Nose, and Throat: Moist mucous membranes  CVS: S1S2+  Resp: No SOB, no wheeze or rhonchi  Abd: soft NTND  Extremities: No edema, no cyanosis  Skin: No bruises, no rashes     Neurological Exam:  Mental Status: Awake, alert and oriented x 2.  Able to follow simple and complex verbal commands. Able to name and repeat. fluent speech. No obvious aphasia or dysarthria noted.   Cranial Nerves: PERRL, EOMI, VFFC, sensati-3 on V1-V3 intact,  no obvious facial asymmetry , equal elevation of palate, scm/trap 5/5, tongue is midline on protrusion. no obvious papilledema on fundoscopic exam. Hearing is grossly intact.   Motor: Normal bulk, tone and strength throughout. Fine finger movements were intact and symmetric. no tremors or drift noted.    Sensation: Intact to light touch and pinprick throughout. no right/left confusion. no extinction to tactile on DSS.   Reflexes: 1+ throughout at biceps, brachioradialis, triceps, patellars and ankles bilaterally and equal. No clonus. R toe and L toe were both downgoing.  Coordination: No dysmetria on FNF    Gait: unsteady     I personally reviewed the below data/images/labs:  no new labs     < from: CT Head No Cont (11.01.22 @ 18:40) >    ACC: [id number] EXAM:  CT BRAIN                          PROCEDURE DATE:  11/01/2022          INTERPRETATION:  CLINICAL INDICATION: Worsening dysphagia    TECHNIQUE: Noncontrast CT of the head was performed.    Multiple contiguous axial images were acquired from the skull base to the   vertex without the administration of intravenous contrast. Coronal and   sagittal reformations were made.    COMPARISON: None.    FINDINGS:  Mild prominence of the sulci and ventricles are consistent with   age-appropriate volume loss. There is no intraparenchymal hematoma, mass   effect or midline shift. The basal cisterns are patent.  No abnormal   extra-axial fluid collections are present.    Mild mucosal thickening of the bilateral ethmoid and sphenoid sinuses.   The mastoid air cells and middle ear cavities are clear. The intraorbital   compartments are unremarkable.    The soft tissues of the scalp are unremarkable. The calvarium is intact.      IMPRESSION:    No acute hemorrhage or mass effect.    --- End of Report ---           JORDAN CHRISTOPHER MD; Resident Radiology  This document has been electronically signed.  KADI MONIQUE MD; Attending Radiologist  This document has been electronically signed. Nov 1 2022  7:30PM    < end of copied text >  < from: Xray Cinesophagram Swallow Function w/ Contrast (11.04.22 @ 11:47) >    ACC: [id number] EXAM:  XR SWAL FUNC NICKY VID CON STDY                          PROCEDURE DATE:  11/04/2022          INTERPRETATION:  CLINICAL INFORMATION: Dysphagia.    TECHNIQUE: A cinesophagram was performed under fluoroscopy in conjunction   with speech pathology.    FLUOROSCOPY TIME: 112 seconds.      IMPRESSION:    Laryngeal penetration and aspiration.    For further information and recommendations, please refer to the speech   pathologist's final report, which is available for review in the   electronic medical record.    --- End of Report ---          ZULEMA STEWART MD; Resident Radiologist  This document has been electronically signed.  BIBI MORLEY MD; Attending Radiologist  This document has been electronically signed. Nov 4 2022  4:34PM    < end of copied text >

## 2022-11-07 NOTE — DISCHARGE NOTE PROVIDER - CARE PROVIDER_API CALL
Juan Roblero  NEUROLOGY  [address], [address]  [place] [address]  Phone: [phone]  Fax: [phone]  Follow Up Time: 1 week    Mann Ponce)  Internal Medicine  [address] [facility], [address]th Floor [place] [address]  Phone: [phone]  Fax: [phone]  Follow Up Time: 1 week   Mann Ponce)  Internal Medicine  [address] [facility], [address]th Floor [place] [address]  Phone: [phone]  Fax: [phone]  Follow Up Time: 1 week    Lonnie Guaman)  Neurology; Vascular Neurology  [address] [facility]  [place] [address]  Phone: [phone]  Fax: [phone]  Follow Up Time: 1 week    Abdoul Manriquez ()  Cardiology; Internal Medicine  [address] [facility], [address]  [place] [address]  Phone: [phone]  Fax: [phone]  Follow Up Time: 1 week

## 2022-11-07 NOTE — PROGRESS NOTE ADULT - SUBJECTIVE AND OBJECTIVE BOX
Subjective: Patient seen and examined. No new events except as noted.   Feels good.    REVIEW OF SYSTEMS:    CONSTITUTIONAL: + weakness, fevers or chills  EYES/ENT: No visual changes;  No vertigo or throat pain   NECK: No pain or stiffness  RESPIRATORY: No cough, wheezing, hemoptysis; No shortness of breath  CARDIOVASCULAR: No chest pain or palpitations  GASTROINTESTINAL: No abdominal or epigastric pain. No nausea, vomiting, or hematemesis; No diarrhea or constipation. No melena or hematochezia.  GENITOURINARY: No dysuria, frequency or hematuria  NEUROLOGICAL: No numbness or weakness  SKIN: No itching, burning, rashes, or lesions   All other review of systems is negative unless indicated above.    MEDICATIONS:  MEDICATIONS  (STANDING):  aspirin enteric coated 81 milliGRAM(s) Oral daily  atorvastatin 40 milliGRAM(s) Oral at bedtime  chlorhexidine 2% Cloths 1 Application(s) Topical <User Schedule>  dorzolamide 2% Ophthalmic Solution 1 Drop(s) Both EYES three times a day  fludroCORTISONE 0.1 milliGRAM(s) Oral daily  folic acid 1 milliGRAM(s) Oral daily  heparin   Injectable 5000 Unit(s) SubCutaneous every 8 hours  latanoprost 0.005% Ophthalmic Solution 1 Drop(s) Both EYES at bedtime  lisinopril 2.5 milliGRAM(s) Oral two times a day  midodrine. 5 milliGRAM(s) Oral three times a day  multivitamin 1 Tablet(s) Oral daily  thiamine 100 milliGRAM(s) Oral daily    PHYSICAL EXAM:  Vital Signs Last 24 Hrs  T(C): 36.8 (07 Nov 2022 05:21), Max: 36.8 (07 Nov 2022 05:21)  T(F): 98.3 (07 Nov 2022 05:21), Max: 98.3 (07 Nov 2022 05:21)  HR: 61 (07 Nov 2022 05:21) (61 - 68)  BP: 147/76 (07 Nov 2022 05:21) (126/72 - 193/95)  BP(mean): --  RR: 18 (07 Nov 2022 05:21) (18 - 18)  SpO2: 94% (07 Nov 2022 05:21) (94% - 96%)    Parameters below as of 07 Nov 2022 05:21  Patient On (Oxygen Delivery Method): room air    I&O's Summary    06 Nov 2022 07:01  -  07 Nov 2022 07:00  --------------------------------------------------------  IN: 480 mL / OUT: 0 mL / NET: 480 mL    07 Nov 2022 07:01  -  07 Nov 2022 10:31  --------------------------------------------------------  IN: 240 mL / OUT: 0 mL / NET: 240 mL    Appearance: NAD  HEENT: Dry oral mucosa, PERRL, EOMI	  Lymphatic: No lymphadenopathy , no edema  Cardiovascular: Normal S1 S2, No JVD, No murmurs , Peripheral pulses palpable 2+ bilaterally  Respiratory: Lungs clear to auscultation, normal effort 	  Gastrointestinal:  Soft, Non-tender, + BS	  Skin: No rashes, No ecchymoses, No cyanosis, warm to touch  Musculoskeletal: Normal range of motion, normal strength  Psychiatry:  Mood & affect appropriate  Ext: No edema    LABS:    CARDIAC MARKERS:    TPro  x   /  Alb  x   /  TBili  1.6<H>  /  DBili  0.5<H>  /  AST  x   /  ALT  x   /  AlkPhos  x   11-07    proBNP:   Lipid Profile:   HgA1c:   TSH:     TELEMETRY: SB/SR  ECG:  	  RADIOLOGY:   DIAGNOSTIC TESTING:  [ ] Echocardiogram:  [ ]  Catheterization:  [ ] Stress Test:    OTHER:

## 2022-11-07 NOTE — PROGRESS NOTE ADULT - ASSESSMENT
82 year old man with possible CVA, orthostatics  -hepatitis panel, inr ordered to evaluate cirrhosis  -pt understands risk of aspiration, but he is not interested in PEG tube at this time.  -orthostatic management per medicine/cardiology  -outpt GI follow up        Attending supervision statement: I have personally seen and examined the patient. I fully participated in the care of this patient. I have made amendments to the documentation where necessary, and agree with the history, physical exam, and plan as outlined by the ACP.    [facility]  Jose Lau M.D.   [address] [place], [place]  Office: [phone]

## 2022-11-07 NOTE — PROVIDER CONTACT NOTE (OTHER) - SITUATION
Patient NPO, cannot give oral medications
Patient's BP is180/84
pt BP on admission to unit 190/81  pt admitted with order for continuous pulse oximetry monitoring
Pt bp elevated, c/o of 4/10 right lower abdominal pain

## 2022-11-07 NOTE — PROVIDER CONTACT NOTE (OTHER) - ASSESSMENT
patient denies chest pain, shortness of breath.   unit does not have a working SPO2 monitor to use for patient.
AAOx4. VSS; denies CP, SOB, no acute events noted on tele.
Patient A&Ox4. Patient failed MBS. Patient's vitals are stable.
Patient is A&Ox4. Patient denies headache, chest pain, SOB, and lightheadedness. Patient is Orthostatic hypotension positive

## 2022-11-07 NOTE — DISCHARGE NOTE PROVIDER - PROVIDER TOKENS
PROVIDER:[TOKEN:[[id number]],FOLLOWUP:[1 week]],PROVIDER:[TOKEN:[[id number]],FOLLOWUP:[1 week]] PROVIDER:[TOKEN:[[id number]],FOLLOWUP:[1 week]],PROVIDER:[TOKEN:[[id number]],FOLLOWUP:[1 week]],PROVIDER:[TOKEN:[[id number]],FOLLOWUP:[1 week]]

## 2022-11-07 NOTE — PROGRESS NOTE ADULT - TIME BILLING
Advanced care planning was discussed with patient and family.  Advanced care planning forms were reviewed and discussed as appropriate.  Differential diagnosis and plan of care discussed with patient after the evaluation.   Pain assessed and judicious use of narcotics when appropriate was discussed.  Importance of Fall prevention discussed.  Counseling on Smoking and Alcohol cessation was offered when appropriate.  Counseling on Diet, exercise, and medication compliance was done.
pt, daughter , gi and np
pt, daughter , np and cardio
pt, np
pt, daughter , np
pt, daughter , np, cardio
Advanced care planning was discussed with patient and family.  Advanced care planning forms were reviewed and discussed as appropriate.  Differential diagnosis and plan of care discussed with patient after the evaluation.   Pain assessed and judicious use of narcotics when appropriate was discussed.  Importance of Fall prevention discussed.  Counseling on Smoking and Alcohol cessation was offered when appropriate.  Counseling on Diet, exercise, and medication compliance was done.
pt, daughter , np, cardio
Advanced care planning was discussed with patient and family.  Advanced care planning forms were reviewed and discussed as appropriate.  Differential diagnosis and plan of care discussed with patient after the evaluation.   Pain assessed and judicious use of narcotics when appropriate was discussed.  Importance of Fall prevention discussed.  Counseling on Smoking and Alcohol cessation was offered when appropriate.  Counseling on Diet, exercise, and medication compliance was done.

## 2022-11-07 NOTE — PROGRESS NOTE ADULT - PROBLEM SELECTOR PROBLEM 2
H/O orthostatic hypotension

## 2022-11-07 NOTE — DISCHARGE NOTE PROVIDER - NSDCFUSCHEDAPPT_GEN_ALL_CORE_FT
Evette Jones  [facility] Physician [facility]  UROLOGY [address]  Scheduled Appointment: 11/28/2022

## 2022-11-07 NOTE — DISCHARGE NOTE PROVIDER - NSDCMRMEDTOKEN_GEN_ALL_CORE_FT
aspirin 81 mg oral delayed release tablet: 1 tab(s) orally once a day  atorvastatin 40 mg oral tablet: 1 tab(s) orally once a day  dorzolamide 2% ophthalmic solution: 1 drop(s) in each eye 2 times a day  fludrocortisone 0.1 mg oral tablet: 1 tab(s) orally once a day  furosemide 20 mg oral tablet: 1 tab(s) orally every other day  Lumigan 0.01% ophthalmic solution: 1 drop(s) in each eye once a day (in the evening)  midodrine 5 mg oral tablet: 1 tab(s) orally 3 times a day.    NOTE: Pharmacy last filled July 2022 for 90 days supply   aspirin 81 mg oral delayed release tablet: 1 tab(s) orally once a day  atorvastatin 40 mg oral tablet: 1 tab(s) orally once a day  bisacodyl 5 mg oral delayed release tablet: 1 tab(s) orally once a day (at bedtime)  dorzolamide 2% ophthalmic solution: 1 drop(s) in each eye 2 times a day  fludrocortisone 0.1 mg oral tablet: 1 tab(s) orally once a day  fluticasone 50 mcg/inh nasal spray: 1 spray(s) nasal 2 times a day  folic acid 1 mg oral tablet: 1 tab(s) orally once a day  loratadine 10 mg oral tablet: 1 tab(s) orally once a day  Lumigan 0.01% ophthalmic solution: 1 drop(s) in each eye once a day (in the evening)  midodrine 5 mg oral tablet: 1 tab(s) orally 3 times a day.      Multiple Vitamins oral tablet: 1 tab(s) orally once a day  senna leaf extract oral tablet: 2 tab(s) orally once a day (at bedtime)  thiamine 100 mg oral tablet: 1 tab(s) orally once a day   aspirin 81 mg oral delayed release tablet: 1 tab(s) orally once a day  atorvastatin 40 mg oral tablet: 1 tab(s) orally once a day  bisacodyl 5 mg oral delayed release tablet: 1 tab(s) orally once a day (at bedtime)  dorzolamide 2% ophthalmic solution: 1 drop(s) in each eye 2 times a day  fludrocortisone 0.1 mg oral tablet: 1 tab(s) orally once a day  ( Hold for sytolic BP is more than 130 )  fluticasone 50 mcg/inh nasal spray: 1 spray(s) nasal 2 times a day  folic acid 1 mg oral tablet: 1 tab(s) orally once a day  lisinopril 2.5 mg oral tablet: 1 tab(s) orally once a day  ( If systolic BP is at least 160 and above )  loratadine 10 mg oral tablet: 1 tab(s) orally once a day  Lumigan 0.01% ophthalmic solution: 1 drop(s) in each eye once a day (in the evening)  midodrine 5 mg oral tablet: 1 tab(s) orally 3 times a day.  ( If systolic BP is more  than 130 )      Multiple Vitamins oral tablet: 1 tab(s) orally once a day  senna leaf extract oral tablet: 2 tab(s) orally once a day (at bedtime)  thiamine 100 mg oral tablet: 1 tab(s) orally once a day

## 2022-11-09 LAB — ACRM BINDING ANTIBODY: 0.07 NMOL/L — SIGNIFICANT CHANGE UP (ref 0–0.24)

## 2022-11-10 LAB
ACHR BLOCK AB SER-ACNC: 15 % — SIGNIFICANT CHANGE UP (ref 0–25)
ACHR MOD AB SER-ACNC: 0 % — SIGNIFICANT CHANGE UP

## 2022-11-15 LAB — MUSK IGG SER IA-MCNC: SIGNIFICANT CHANGE UP

## 2022-11-28 ENCOUNTER — APPOINTMENT (OUTPATIENT)
Dept: UROLOGY | Facility: CLINIC | Age: 82
End: 2022-11-28

## 2022-11-28 VITALS
HEART RATE: 72 BPM | SYSTOLIC BLOOD PRESSURE: 172 MMHG | HEIGHT: 67 IN | WEIGHT: 175 LBS | RESPIRATION RATE: 15 BRPM | BODY MASS INDEX: 27.47 KG/M2 | DIASTOLIC BLOOD PRESSURE: 81 MMHG | TEMPERATURE: 97.8 F

## 2022-11-28 DIAGNOSIS — Z86.69 PERSONAL HISTORY OF OTHER DISEASES OF THE NERVOUS SYSTEM AND SENSE ORGANS: ICD-10-CM

## 2022-11-28 DIAGNOSIS — Z87.19 PERSONAL HISTORY OF OTHER DISEASES OF THE DIGESTIVE SYSTEM: ICD-10-CM

## 2022-11-28 DIAGNOSIS — Z86.79 PERSONAL HISTORY OF OTHER DISEASES OF THE CIRCULATORY SYSTEM: ICD-10-CM

## 2022-11-28 DIAGNOSIS — N43.3 HYDROCELE, UNSPECIFIED: ICD-10-CM

## 2022-11-28 PROCEDURE — 99203 OFFICE O/P NEW LOW 30 MIN: CPT

## 2022-12-04 PROBLEM — N43.3 BILATERAL HYDROCELE: Status: ACTIVE | Noted: 2022-12-04

## 2022-12-04 NOTE — HISTORY OF PRESENT ILLNESS
[FreeTextEntry1] : 81 yo M recently hospitalized for AMS\par During hospitalization, noted to have scrotal swelling\par Per pt, this is chronic\par Denies any pain or discomfort\par Denies any LUTS\par Scrotal US done at [facility] on 9/23/22 confirmed bilateral mild hydrocele

## 2022-12-04 NOTE — PHYSICAL EXAM
[General Appearance - Well Developed] : well developed [General Appearance - Well Nourished] : well nourished [Normal Appearance] : normal appearance [Well Groomed] : well groomed [General Appearance - In No Acute Distress] : no acute distress [Edema] : no peripheral edema [Respiration, Rhythm And Depth] : normal respiratory rhythm and effort [Exaggerated Use Of Accessory Muscles For Inspiration] : no accessory muscle use [Abdomen Soft] : soft [Abdomen Tenderness] : non-tender [Costovertebral Angle Tenderness] : no ~M costovertebral angle tenderness [Urethral Meatus] : meatus normal [Urinary Bladder Findings] : the bladder was normal on palpation [Scrotum] : the scrotum was normal [Epididymis] : the epididymides were normal [Testes Tenderness] : no tenderness of the testes [Testes Mass (___cm)] : there were no testicular masses [FreeTextEntry1] : Very mild bilateral hydrocele [Normal Station and Gait] : the gait and station were normal for the patient's age [] : no rash [No Focal Deficits] : no focal deficits [Oriented To Time, Place, And Person] : oriented to person, place, and time [Affect] : the affect was normal [Mood] : the mood was normal [Not Anxious] : not anxious [No Palpable Adenopathy] : no palpable adenopathy

## 2023-01-01 ENCOUNTER — NON-APPOINTMENT (OUTPATIENT)
Age: 83
End: 2023-01-01

## 2023-01-01 ENCOUNTER — APPOINTMENT (OUTPATIENT)
Dept: HEPATOLOGY | Facility: CLINIC | Age: 83
End: 2023-01-01
Payer: MEDICARE

## 2023-01-01 ENCOUNTER — APPOINTMENT (OUTPATIENT)
Dept: CT IMAGING | Facility: IMAGING CENTER | Age: 83
End: 2023-01-01
Payer: MEDICARE

## 2023-01-01 ENCOUNTER — RESULT REVIEW (OUTPATIENT)
Age: 83
End: 2023-01-01

## 2023-01-01 ENCOUNTER — APPOINTMENT (OUTPATIENT)
Dept: HEPATOLOGY | Facility: CLINIC | Age: 83
End: 2023-01-01

## 2023-01-01 ENCOUNTER — APPOINTMENT (OUTPATIENT)
Dept: OPHTHALMOLOGY | Facility: CLINIC | Age: 83
End: 2023-01-01
Payer: MEDICARE

## 2023-01-01 ENCOUNTER — APPOINTMENT (OUTPATIENT)
Dept: ULTRASOUND IMAGING | Facility: IMAGING CENTER | Age: 83
End: 2023-01-01
Payer: MEDICARE

## 2023-01-01 ENCOUNTER — TRANSCRIPTION ENCOUNTER (OUTPATIENT)
Age: 83
End: 2023-01-01

## 2023-01-01 ENCOUNTER — APPOINTMENT (OUTPATIENT)
Dept: ULTRASOUND IMAGING | Facility: CLINIC | Age: 83
End: 2023-01-01
Payer: MEDICARE

## 2023-01-01 ENCOUNTER — OUTPATIENT (OUTPATIENT)
Dept: OUTPATIENT SERVICES | Facility: HOSPITAL | Age: 83
LOS: 1 days | End: 2023-01-01
Payer: MEDICARE

## 2023-01-01 ENCOUNTER — APPOINTMENT (OUTPATIENT)
Dept: HOME HEALTH SERVICES | Facility: HOME HEALTH | Age: 83
End: 2023-01-01
Payer: MEDICARE

## 2023-01-01 ENCOUNTER — OUTPATIENT (OUTPATIENT)
Dept: OUTPATIENT SERVICES | Facility: HOSPITAL | Age: 83
LOS: 1 days | Discharge: HOME | End: 2023-01-01
Payer: MEDICARE

## 2023-01-01 ENCOUNTER — APPOINTMENT (OUTPATIENT)
Dept: OTOLARYNGOLOGY | Facility: CLINIC | Age: 83
End: 2023-01-01
Payer: MEDICARE

## 2023-01-01 ENCOUNTER — APPOINTMENT (OUTPATIENT)
Dept: RADIOLOGY | Facility: CLINIC | Age: 83
End: 2023-01-01

## 2023-01-01 ENCOUNTER — APPOINTMENT (OUTPATIENT)
Dept: RADIOLOGY | Facility: HOSPITAL | Age: 83
End: 2023-01-01

## 2023-01-01 ENCOUNTER — APPOINTMENT (OUTPATIENT)
Dept: OTOLARYNGOLOGY | Facility: CLINIC | Age: 83
End: 2023-01-01

## 2023-01-01 ENCOUNTER — INPATIENT (INPATIENT)
Facility: HOSPITAL | Age: 83
LOS: 18 days | Discharge: HOME | DRG: 177 | End: 2023-12-04
Attending: STUDENT IN AN ORGANIZED HEALTH CARE EDUCATION/TRAINING PROGRAM | Admitting: STUDENT IN AN ORGANIZED HEALTH CARE EDUCATION/TRAINING PROGRAM
Payer: MEDICARE

## 2023-01-01 ENCOUNTER — APPOINTMENT (OUTPATIENT)
Dept: NEUROLOGY | Facility: CLINIC | Age: 83
End: 2023-01-01
Payer: MEDICARE

## 2023-01-01 VITALS
OXYGEN SATURATION: 94 % | WEIGHT: 148 LBS | BODY MASS INDEX: 23.23 KG/M2 | HEIGHT: 67 IN | DIASTOLIC BLOOD PRESSURE: 70 MMHG | SYSTOLIC BLOOD PRESSURE: 120 MMHG | TEMPERATURE: 98.4 F | HEART RATE: 66 BPM

## 2023-01-01 VITALS — HEART RATE: 78 BPM | OXYGEN SATURATION: 99 % | TEMPERATURE: 97.88 F | RESPIRATION RATE: 16 BRPM

## 2023-01-01 VITALS
WEIGHT: 151 LBS | DIASTOLIC BLOOD PRESSURE: 68 MMHG | HEIGHT: 67 IN | SYSTOLIC BLOOD PRESSURE: 133 MMHG | HEART RATE: 78 BPM | BODY MASS INDEX: 23.7 KG/M2

## 2023-01-01 VITALS
SYSTOLIC BLOOD PRESSURE: 131 MMHG | HEIGHT: 67 IN | WEIGHT: 163 LBS | TEMPERATURE: 97.6 F | OXYGEN SATURATION: 97 % | DIASTOLIC BLOOD PRESSURE: 72 MMHG | BODY MASS INDEX: 25.58 KG/M2 | HEART RATE: 69 BPM | RESPIRATION RATE: 15 BRPM

## 2023-01-01 VITALS
DIASTOLIC BLOOD PRESSURE: 71 MMHG | SYSTOLIC BLOOD PRESSURE: 123 MMHG | RESPIRATION RATE: 18 BRPM | OXYGEN SATURATION: 95 % | HEART RATE: 72 BPM | TEMPERATURE: 98 F | WEIGHT: 154.98 LBS | HEIGHT: 67 IN

## 2023-01-01 VITALS
TEMPERATURE: 97 F | DIASTOLIC BLOOD PRESSURE: 68 MMHG | HEART RATE: 64 BPM | RESPIRATION RATE: 18 BRPM | SYSTOLIC BLOOD PRESSURE: 157 MMHG | OXYGEN SATURATION: 97 %

## 2023-01-01 VITALS
HEIGHT: 67 IN | WEIGHT: 151 LBS | SYSTOLIC BLOOD PRESSURE: 100 MMHG | OXYGEN SATURATION: 98 % | BODY MASS INDEX: 23.7 KG/M2 | HEART RATE: 70 BPM | DIASTOLIC BLOOD PRESSURE: 60 MMHG | TEMPERATURE: 97.3 F | SYSTOLIC BLOOD PRESSURE: 110 MMHG | DIASTOLIC BLOOD PRESSURE: 60 MMHG

## 2023-01-01 VITALS
HEART RATE: 70 BPM | SYSTOLIC BLOOD PRESSURE: 151 MMHG | DIASTOLIC BLOOD PRESSURE: 80 MMHG | BODY MASS INDEX: 23.7 KG/M2 | WEIGHT: 151 LBS | HEIGHT: 67 IN

## 2023-01-01 VITALS
BODY MASS INDEX: 23.23 KG/M2 | WEIGHT: 148 LBS | DIASTOLIC BLOOD PRESSURE: 68 MMHG | SYSTOLIC BLOOD PRESSURE: 114 MMHG | HEART RATE: 76 BPM | HEIGHT: 67 IN

## 2023-01-01 VITALS — HEART RATE: 78 BPM | DIASTOLIC BLOOD PRESSURE: 70 MMHG | SYSTOLIC BLOOD PRESSURE: 91 MMHG

## 2023-01-01 DIAGNOSIS — K74.60 UNSPECIFIED CIRRHOSIS OF LIVER: ICD-10-CM

## 2023-01-01 DIAGNOSIS — J18.9 PNEUMONIA, UNSPECIFIED ORGANISM: ICD-10-CM

## 2023-01-01 DIAGNOSIS — R13.12 DYSPHAGIA, OROPHARYNGEAL PHASE: ICD-10-CM

## 2023-01-01 DIAGNOSIS — Z02.9 ENCOUNTER FOR ADMINISTRATIVE EXAMINATIONS, UNSPECIFIED: ICD-10-CM

## 2023-01-01 DIAGNOSIS — E43 UNSPECIFIED SEVERE PROTEIN-CALORIE MALNUTRITION: ICD-10-CM

## 2023-01-01 DIAGNOSIS — Z29.9 ENCOUNTER FOR PROPHYLACTIC MEASURES, UNSPECIFIED: ICD-10-CM

## 2023-01-01 DIAGNOSIS — R05.3 CHRONIC COUGH: ICD-10-CM

## 2023-01-01 DIAGNOSIS — R13.10 DYSPHAGIA, UNSPECIFIED: ICD-10-CM

## 2023-01-01 DIAGNOSIS — R49.0 DYSPHONIA: ICD-10-CM

## 2023-01-01 DIAGNOSIS — R77.0 ABNORMALITY OF ALBUMIN: ICD-10-CM

## 2023-01-01 DIAGNOSIS — J69.0 PNEUMONITIS DUE TO INHALATION OF FOOD AND VOMIT: ICD-10-CM

## 2023-01-01 DIAGNOSIS — D63.8 ANEMIA IN OTHER CHRONIC DISEASES CLASSIFIED ELSEWHERE: ICD-10-CM

## 2023-01-01 DIAGNOSIS — R18.8 OTHER ASCITES: ICD-10-CM

## 2023-01-01 DIAGNOSIS — Z98.890 OTHER SPECIFIED POSTPROCEDURAL STATES: Chronic | ICD-10-CM

## 2023-01-01 DIAGNOSIS — Z86.73 PERSONAL HISTORY OF TRANSIENT ISCHEMIC ATTACK (TIA), AND CEREBRAL INFARCTION WITHOUT RESIDUAL DEFICITS: ICD-10-CM

## 2023-01-01 DIAGNOSIS — F03.90 UNSPECIFIED DEMENTIA WITHOUT BEHAVIORAL DISTURBANCE: ICD-10-CM

## 2023-01-01 DIAGNOSIS — G31.83 DEMENTIA WITH LEWY BODIES: ICD-10-CM

## 2023-01-01 DIAGNOSIS — H40.9 UNSPECIFIED GLAUCOMA: ICD-10-CM

## 2023-01-01 DIAGNOSIS — R05.9 COUGH, UNSPECIFIED: ICD-10-CM

## 2023-01-01 DIAGNOSIS — F02.80 DEMENTIA WITH LEWY BODIES: ICD-10-CM

## 2023-01-01 DIAGNOSIS — Z51.5 ENCOUNTER FOR PALLIATIVE CARE: ICD-10-CM

## 2023-01-01 DIAGNOSIS — F10.11 ALCOHOL ABUSE, IN REMISSION: ICD-10-CM

## 2023-01-01 DIAGNOSIS — I95.1 ORTHOSTATIC HYPOTENSION: ICD-10-CM

## 2023-01-01 DIAGNOSIS — R26.81 UNSTEADINESS ON FEET: ICD-10-CM

## 2023-01-01 DIAGNOSIS — E87.6 HYPOKALEMIA: ICD-10-CM

## 2023-01-01 DIAGNOSIS — G31.83 NEUROCOGNITIVE DISORDER WITH LEWY BODIES: ICD-10-CM

## 2023-01-01 LAB
ALBUMIN FLD-MCNC: 0.7 G/DL — SIGNIFICANT CHANGE UP
ALBUMIN FLD-MCNC: 0.7 G/DL — SIGNIFICANT CHANGE UP
ALBUMIN SERPL ELPH-MCNC: 1.7 G/DL — LOW (ref 3.5–5)
ALBUMIN SERPL ELPH-MCNC: 1.7 G/DL — LOW (ref 3.5–5)
ALBUMIN SERPL ELPH-MCNC: 1.9 G/DL — LOW (ref 3.5–5)
ALBUMIN SERPL ELPH-MCNC: 1.9 G/DL — LOW (ref 3.5–5)
ALBUMIN SERPL ELPH-MCNC: 2.6 G/DL
ALBUMIN SERPL ELPH-MCNC: 2.6 G/DL
ALBUMIN SERPL ELPH-MCNC: 3 G/DL
ALBUMIN SERPL ELPH-MCNC: 3 G/DL
ALP BLD-CCNC: 78 U/L
ALP BLD-CCNC: 84 U/L
ALP BLD-CCNC: 86 U/L
ALP BLD-CCNC: 86 U/L
ALP SERPL-CCNC: 64 U/L — SIGNIFICANT CHANGE UP (ref 40–120)
ALP SERPL-CCNC: 64 U/L — SIGNIFICANT CHANGE UP (ref 40–120)
ALP SERPL-CCNC: 75 U/L — SIGNIFICANT CHANGE UP (ref 40–120)
ALP SERPL-CCNC: 75 U/L — SIGNIFICANT CHANGE UP (ref 40–120)
ALT FLD-CCNC: 21 U/L DA — SIGNIFICANT CHANGE UP (ref 10–60)
ALT FLD-CCNC: 21 U/L DA — SIGNIFICANT CHANGE UP (ref 10–60)
ALT FLD-CCNC: 27 U/L DA — SIGNIFICANT CHANGE UP (ref 10–60)
ALT FLD-CCNC: 27 U/L DA — SIGNIFICANT CHANGE UP (ref 10–60)
ALT SERPL-CCNC: 21 U/L
ALT SERPL-CCNC: 24 U/L
ALT SERPL-CCNC: 27 U/L
ALT SERPL-CCNC: 28 U/L
ANION GAP SERPL CALC-SCNC: 0 MMOL/L — LOW (ref 5–17)
ANION GAP SERPL CALC-SCNC: 0 MMOL/L — LOW (ref 5–17)
ANION GAP SERPL CALC-SCNC: 11 MMOL/L
ANION GAP SERPL CALC-SCNC: 2 MMOL/L — LOW (ref 5–17)
ANION GAP SERPL CALC-SCNC: 3 MMOL/L — LOW (ref 5–17)
ANION GAP SERPL CALC-SCNC: 4 MMOL/L — LOW (ref 5–17)
ANION GAP SERPL CALC-SCNC: 5 MMOL/L — SIGNIFICANT CHANGE UP (ref 5–17)
ANION GAP SERPL CALC-SCNC: 5 MMOL/L — SIGNIFICANT CHANGE UP (ref 5–17)
ANION GAP SERPL CALC-SCNC: 7 MMOL/L
ANION GAP SERPL CALC-SCNC: 9 MMOL/L
ANION GAP SERPL CALC-SCNC: 9 MMOL/L
APPEARANCE UR: CLEAR — SIGNIFICANT CHANGE UP
APPEARANCE UR: CLEAR — SIGNIFICANT CHANGE UP
APTT BLD: 37.9 SEC — HIGH (ref 24.5–35.6)
APTT BLD: 37.9 SEC — HIGH (ref 24.5–35.6)
AST SERPL-CCNC: 25 U/L
AST SERPL-CCNC: 25 U/L — SIGNIFICANT CHANGE UP (ref 10–40)
AST SERPL-CCNC: 25 U/L — SIGNIFICANT CHANGE UP (ref 10–40)
AST SERPL-CCNC: 29 U/L — SIGNIFICANT CHANGE UP (ref 10–40)
AST SERPL-CCNC: 29 U/L — SIGNIFICANT CHANGE UP (ref 10–40)
AST SERPL-CCNC: 35 U/L
AST SERPL-CCNC: 41 U/L
AST SERPL-CCNC: 45 U/L
B PERT IGG+IGM PNL SER: ABNORMAL
B PERT IGG+IGM PNL SER: ABNORMAL
BASOPHILS # BLD AUTO: 0.04 K/UL — SIGNIFICANT CHANGE UP (ref 0–0.2)
BASOPHILS # BLD AUTO: 0.04 K/UL — SIGNIFICANT CHANGE UP (ref 0–0.2)
BASOPHILS # BLD AUTO: 0.05 K/UL — SIGNIFICANT CHANGE UP (ref 0–0.2)
BASOPHILS # BLD AUTO: 0.05 K/UL — SIGNIFICANT CHANGE UP (ref 0–0.2)
BASOPHILS NFR BLD AUTO: 0.8 % — SIGNIFICANT CHANGE UP (ref 0–2)
BASOPHILS NFR BLD AUTO: 0.8 % — SIGNIFICANT CHANGE UP (ref 0–2)
BASOPHILS NFR BLD AUTO: 1.3 % — SIGNIFICANT CHANGE UP (ref 0–2)
BASOPHILS NFR BLD AUTO: 1.3 % — SIGNIFICANT CHANGE UP (ref 0–2)
BILIRUB DIRECT SERPL-MCNC: 0.6 MG/DL
BILIRUB SERPL-MCNC: 1 MG/DL
BILIRUB SERPL-MCNC: 1 MG/DL — SIGNIFICANT CHANGE UP (ref 0.2–1.2)
BILIRUB SERPL-MCNC: 1 MG/DL — SIGNIFICANT CHANGE UP (ref 0.2–1.2)
BILIRUB SERPL-MCNC: 1.2 MG/DL
BILIRUB SERPL-MCNC: 1.2 MG/DL
BILIRUB SERPL-MCNC: 1.4 MG/DL
BILIRUB SERPL-MCNC: 1.5 MG/DL — HIGH (ref 0.2–1.2)
BILIRUB SERPL-MCNC: 1.5 MG/DL — HIGH (ref 0.2–1.2)
BILIRUB UR-MCNC: NEGATIVE — SIGNIFICANT CHANGE UP
BILIRUB UR-MCNC: NEGATIVE — SIGNIFICANT CHANGE UP
BUN SERPL-MCNC: 12 MG/DL
BUN SERPL-MCNC: 13 MG/DL — SIGNIFICANT CHANGE UP (ref 7–18)
BUN SERPL-MCNC: 14 MG/DL — SIGNIFICANT CHANGE UP (ref 7–18)
BUN SERPL-MCNC: 15 MG/DL — SIGNIFICANT CHANGE UP (ref 7–18)
BUN SERPL-MCNC: 16 MG/DL
BUN SERPL-MCNC: 16 MG/DL — SIGNIFICANT CHANGE UP (ref 7–18)
BUN SERPL-MCNC: 16 MG/DL — SIGNIFICANT CHANGE UP (ref 7–18)
BUN SERPL-MCNC: 17 MG/DL
BUN SERPL-MCNC: 20 MG/DL
BUN SERPL-MCNC: 20 MG/DL — HIGH (ref 7–18)
CALCIUM SERPL-MCNC: 7.5 MG/DL — LOW (ref 8.4–10.5)
CALCIUM SERPL-MCNC: 7.5 MG/DL — LOW (ref 8.4–10.5)
CALCIUM SERPL-MCNC: 7.6 MG/DL — LOW (ref 8.4–10.5)
CALCIUM SERPL-MCNC: 7.6 MG/DL — LOW (ref 8.4–10.5)
CALCIUM SERPL-MCNC: 7.7 MG/DL — LOW (ref 8.4–10.5)
CALCIUM SERPL-MCNC: 7.8 MG/DL — LOW (ref 8.4–10.5)
CALCIUM SERPL-MCNC: 7.8 MG/DL — LOW (ref 8.4–10.5)
CALCIUM SERPL-MCNC: 7.9 MG/DL — LOW (ref 8.4–10.5)
CALCIUM SERPL-MCNC: 8 MG/DL — LOW (ref 8.4–10.5)
CALCIUM SERPL-MCNC: 8.5 MG/DL
CALCIUM SERPL-MCNC: 8.6 MG/DL
CALCIUM SERPL-MCNC: 8.9 MG/DL
CALCIUM SERPL-MCNC: 9 MG/DL
CHLORIDE SERPL-SCNC: 106 MMOL/L
CHLORIDE SERPL-SCNC: 106 MMOL/L
CHLORIDE SERPL-SCNC: 107 MMOL/L — SIGNIFICANT CHANGE UP (ref 96–108)
CHLORIDE SERPL-SCNC: 107 MMOL/L — SIGNIFICANT CHANGE UP (ref 96–108)
CHLORIDE SERPL-SCNC: 108 MMOL/L — SIGNIFICANT CHANGE UP (ref 96–108)
CHLORIDE SERPL-SCNC: 108 MMOL/L — SIGNIFICANT CHANGE UP (ref 96–108)
CHLORIDE SERPL-SCNC: 109 MMOL/L
CHLORIDE SERPL-SCNC: 109 MMOL/L
CHLORIDE SERPL-SCNC: 109 MMOL/L — HIGH (ref 96–108)
CHLORIDE SERPL-SCNC: 109 MMOL/L — HIGH (ref 96–108)
CHLORIDE SERPL-SCNC: 110 MMOL/L — HIGH (ref 96–108)
CHLORIDE SERPL-SCNC: 111 MMOL/L — HIGH (ref 96–108)
CHLORIDE SERPL-SCNC: 112 MMOL/L — HIGH (ref 96–108)
CHLORIDE SERPL-SCNC: 113 MMOL/L — HIGH (ref 96–108)
CHLORIDE SERPL-SCNC: 113 MMOL/L — HIGH (ref 96–108)
CO2 SERPL-SCNC: 20 MMOL/L
CO2 SERPL-SCNC: 26 MMOL/L
CO2 SERPL-SCNC: 27 MMOL/L — SIGNIFICANT CHANGE UP (ref 22–31)
CO2 SERPL-SCNC: 28 MMOL/L
CO2 SERPL-SCNC: 28 MMOL/L
CO2 SERPL-SCNC: 28 MMOL/L — SIGNIFICANT CHANGE UP (ref 22–31)
CO2 SERPL-SCNC: 29 MMOL/L — SIGNIFICANT CHANGE UP (ref 22–31)
CO2 SERPL-SCNC: 30 MMOL/L — SIGNIFICANT CHANGE UP (ref 22–31)
CO2 SERPL-SCNC: 31 MMOL/L — SIGNIFICANT CHANGE UP (ref 22–31)
CO2 SERPL-SCNC: 31 MMOL/L — SIGNIFICANT CHANGE UP (ref 22–31)
COLOR FLD: YELLOW — SIGNIFICANT CHANGE UP
COLOR FLD: YELLOW — SIGNIFICANT CHANGE UP
COLOR SPEC: SIGNIFICANT CHANGE UP
COLOR SPEC: SIGNIFICANT CHANGE UP
CREAT SERPL-MCNC: 0.85 MG/DL — SIGNIFICANT CHANGE UP (ref 0.5–1.3)
CREAT SERPL-MCNC: 0.93 MG/DL — SIGNIFICANT CHANGE UP (ref 0.5–1.3)
CREAT SERPL-MCNC: 0.94 MG/DL — SIGNIFICANT CHANGE UP (ref 0.5–1.3)
CREAT SERPL-MCNC: 0.94 MG/DL — SIGNIFICANT CHANGE UP (ref 0.5–1.3)
CREAT SERPL-MCNC: 0.95 MG/DL
CREAT SERPL-MCNC: 0.96 MG/DL — SIGNIFICANT CHANGE UP (ref 0.5–1.3)
CREAT SERPL-MCNC: 1 MG/DL — SIGNIFICANT CHANGE UP (ref 0.5–1.3)
CREAT SERPL-MCNC: 1 MG/DL — SIGNIFICANT CHANGE UP (ref 0.5–1.3)
CREAT SERPL-MCNC: 1.02 MG/DL — SIGNIFICANT CHANGE UP (ref 0.5–1.3)
CREAT SERPL-MCNC: 1.02 MG/DL — SIGNIFICANT CHANGE UP (ref 0.5–1.3)
CREAT SERPL-MCNC: 1.07 MG/DL
CREAT SERPL-MCNC: 1.07 MG/DL
CREAT SERPL-MCNC: 1.09 MG/DL
CREAT SERPL-MCNC: 1.13 MG/DL — SIGNIFICANT CHANGE UP (ref 0.5–1.3)
CREAT SERPL-MCNC: 1.13 MG/DL — SIGNIFICANT CHANGE UP (ref 0.5–1.3)
CULTURE RESULTS: SIGNIFICANT CHANGE UP
DIFF PNL FLD: NEGATIVE — SIGNIFICANT CHANGE UP
DIFF PNL FLD: NEGATIVE — SIGNIFICANT CHANGE UP
EGFR: 64 ML/MIN/1.73M2 — SIGNIFICANT CHANGE UP
EGFR: 64 ML/MIN/1.73M2 — SIGNIFICANT CHANGE UP
EGFR: 67 ML/MIN/1.73M2
EGFR: 68 ML/MIN/1.73M2
EGFR: 68 ML/MIN/1.73M2
EGFR: 72 ML/MIN/1.73M2 — SIGNIFICANT CHANGE UP
EGFR: 72 ML/MIN/1.73M2 — SIGNIFICANT CHANGE UP
EGFR: 74 ML/MIN/1.73M2 — SIGNIFICANT CHANGE UP
EGFR: 74 ML/MIN/1.73M2 — SIGNIFICANT CHANGE UP
EGFR: 77 ML/MIN/1.73M2 — SIGNIFICANT CHANGE UP
EGFR: 78 ML/MIN/1.73M2
EGFR: 79 ML/MIN/1.73M2 — SIGNIFICANT CHANGE UP
EGFR: 79 ML/MIN/1.73M2 — SIGNIFICANT CHANGE UP
EGFR: 80 ML/MIN/1.73M2 — SIGNIFICANT CHANGE UP
EGFR: 85 ML/MIN/1.73M2 — SIGNIFICANT CHANGE UP
EOSINOPHIL # BLD AUTO: 0.08 K/UL — SIGNIFICANT CHANGE UP (ref 0–0.5)
EOSINOPHIL # BLD AUTO: 0.08 K/UL — SIGNIFICANT CHANGE UP (ref 0–0.5)
EOSINOPHIL # BLD AUTO: 0.22 K/UL — SIGNIFICANT CHANGE UP (ref 0–0.5)
EOSINOPHIL # BLD AUTO: 0.22 K/UL — SIGNIFICANT CHANGE UP (ref 0–0.5)
EOSINOPHIL NFR BLD AUTO: 1.6 % — SIGNIFICANT CHANGE UP (ref 0–6)
EOSINOPHIL NFR BLD AUTO: 1.6 % — SIGNIFICANT CHANGE UP (ref 0–6)
EOSINOPHIL NFR BLD AUTO: 5.5 % — SIGNIFICANT CHANGE UP (ref 0–6)
EOSINOPHIL NFR BLD AUTO: 5.5 % — SIGNIFICANT CHANGE UP (ref 0–6)
FLUID INTAKE SUBSTANCE CLASS: SIGNIFICANT CHANGE UP
FLUID INTAKE SUBSTANCE CLASS: SIGNIFICANT CHANGE UP
GLUCOSE BLDC GLUCOMTR-MCNC: 126 MG/DL — HIGH (ref 70–99)
GLUCOSE BLDC GLUCOMTR-MCNC: 126 MG/DL — HIGH (ref 70–99)
GLUCOSE FLD-MCNC: 149 MG/DL — SIGNIFICANT CHANGE UP
GLUCOSE FLD-MCNC: 149 MG/DL — SIGNIFICANT CHANGE UP
GLUCOSE SERPL-MCNC: 106 MG/DL — HIGH (ref 70–99)
GLUCOSE SERPL-MCNC: 106 MG/DL — HIGH (ref 70–99)
GLUCOSE SERPL-MCNC: 82 MG/DL — SIGNIFICANT CHANGE UP (ref 70–99)
GLUCOSE SERPL-MCNC: 82 MG/DL — SIGNIFICANT CHANGE UP (ref 70–99)
GLUCOSE SERPL-MCNC: 86 MG/DL — SIGNIFICANT CHANGE UP (ref 70–99)
GLUCOSE SERPL-MCNC: 89 MG/DL — SIGNIFICANT CHANGE UP (ref 70–99)
GLUCOSE SERPL-MCNC: 89 MG/DL — SIGNIFICANT CHANGE UP (ref 70–99)
GLUCOSE SERPL-MCNC: 91 MG/DL — SIGNIFICANT CHANGE UP (ref 70–99)
GLUCOSE SERPL-MCNC: 92 MG/DL — SIGNIFICANT CHANGE UP (ref 70–99)
GLUCOSE SERPL-MCNC: 97 MG/DL — SIGNIFICANT CHANGE UP (ref 70–99)
GLUCOSE SERPL-MCNC: 97 MG/DL — SIGNIFICANT CHANGE UP (ref 70–99)
GLUCOSE SERPL-MCNC: 99 MG/DL — SIGNIFICANT CHANGE UP (ref 70–99)
GLUCOSE SERPL-MCNC: 99 MG/DL — SIGNIFICANT CHANGE UP (ref 70–99)
GLUCOSE UR QL: NEGATIVE MG/DL — SIGNIFICANT CHANGE UP
GLUCOSE UR QL: NEGATIVE MG/DL — SIGNIFICANT CHANGE UP
GRAM STN FLD: SIGNIFICANT CHANGE UP
GRAM STN FLD: SIGNIFICANT CHANGE UP
HCT VFR BLD CALC: 28.6 % — LOW (ref 39–50)
HCT VFR BLD CALC: 28.6 % — LOW (ref 39–50)
HCT VFR BLD CALC: 30 % — LOW (ref 39–50)
HCT VFR BLD CALC: 30 % — LOW (ref 39–50)
HCT VFR BLD CALC: 31.6 % — LOW (ref 39–50)
HCT VFR BLD CALC: 31.6 % — LOW (ref 39–50)
HCT VFR BLD CALC: 31.7 % — LOW (ref 39–50)
HCT VFR BLD CALC: 31.7 % — LOW (ref 39–50)
HCT VFR BLD CALC: 32 % — LOW (ref 39–50)
HCT VFR BLD CALC: 32 % — LOW (ref 39–50)
HCT VFR BLD CALC: 32.2 % — LOW (ref 39–50)
HCT VFR BLD CALC: 32.2 % — LOW (ref 39–50)
HCT VFR BLD CALC: 32.3 % — LOW (ref 39–50)
HCT VFR BLD CALC: 32.3 % — LOW (ref 39–50)
HCT VFR BLD CALC: 32.9 % — LOW (ref 39–50)
HCT VFR BLD CALC: 32.9 % — LOW (ref 39–50)
HCT VFR BLD CALC: 36.8 % — LOW (ref 39–50)
HCT VFR BLD CALC: 36.8 % — LOW (ref 39–50)
HCT VFR BLD CALC: 37.2 %
HGB BLD-MCNC: 10.3 G/DL — LOW (ref 13–17)
HGB BLD-MCNC: 10.3 G/DL — LOW (ref 13–17)
HGB BLD-MCNC: 10.4 G/DL — LOW (ref 13–17)
HGB BLD-MCNC: 10.6 G/DL — LOW (ref 13–17)
HGB BLD-MCNC: 10.6 G/DL — LOW (ref 13–17)
HGB BLD-MCNC: 10.7 G/DL — LOW (ref 13–17)
HGB BLD-MCNC: 10.7 G/DL — LOW (ref 13–17)
HGB BLD-MCNC: 11.8 G/DL — LOW (ref 13–17)
HGB BLD-MCNC: 11.8 G/DL — LOW (ref 13–17)
HGB BLD-MCNC: 12 G/DL
HGB BLD-MCNC: 9.2 G/DL — LOW (ref 13–17)
HGB BLD-MCNC: 9.2 G/DL — LOW (ref 13–17)
HGB BLD-MCNC: 9.7 G/DL — LOW (ref 13–17)
HGB BLD-MCNC: 9.7 G/DL — LOW (ref 13–17)
HIV 1 & 2 AB SERPL IA.RAPID: SIGNIFICANT CHANGE UP
HIV 1 & 2 AB SERPL IA.RAPID: SIGNIFICANT CHANGE UP
IMM GRANULOCYTES NFR BLD AUTO: 0 % — SIGNIFICANT CHANGE UP (ref 0–0.9)
IMM GRANULOCYTES NFR BLD AUTO: 0 % — SIGNIFICANT CHANGE UP (ref 0–0.9)
IMM GRANULOCYTES NFR BLD AUTO: 0.2 % — SIGNIFICANT CHANGE UP (ref 0–0.9)
IMM GRANULOCYTES NFR BLD AUTO: 0.2 % — SIGNIFICANT CHANGE UP (ref 0–0.9)
INR BLD: 1.25 RATIO — HIGH (ref 0.85–1.18)
INR BLD: 1.25 RATIO — HIGH (ref 0.85–1.18)
INR PPP: 1.16 RATIO
INR PPP: 1.23 RATIO
INR PPP: 1.26 RATIO
KETONES UR-MCNC: NEGATIVE MG/DL — SIGNIFICANT CHANGE UP
KETONES UR-MCNC: NEGATIVE MG/DL — SIGNIFICANT CHANGE UP
LDH SERPL L TO P-CCNC: 84 U/L — SIGNIFICANT CHANGE UP
LDH SERPL L TO P-CCNC: 84 U/L — SIGNIFICANT CHANGE UP
LEUKOCYTE ESTERASE UR-ACNC: NEGATIVE — SIGNIFICANT CHANGE UP
LEUKOCYTE ESTERASE UR-ACNC: NEGATIVE — SIGNIFICANT CHANGE UP
LYMPHOCYTES # BLD AUTO: 1.02 K/UL — SIGNIFICANT CHANGE UP (ref 1–3.3)
LYMPHOCYTES # BLD AUTO: 1.02 K/UL — SIGNIFICANT CHANGE UP (ref 1–3.3)
LYMPHOCYTES # BLD AUTO: 1.05 K/UL — SIGNIFICANT CHANGE UP (ref 1–3.3)
LYMPHOCYTES # BLD AUTO: 1.05 K/UL — SIGNIFICANT CHANGE UP (ref 1–3.3)
LYMPHOCYTES # BLD AUTO: 21.3 % — SIGNIFICANT CHANGE UP (ref 13–44)
LYMPHOCYTES # BLD AUTO: 21.3 % — SIGNIFICANT CHANGE UP (ref 13–44)
LYMPHOCYTES # BLD AUTO: 25.7 % — SIGNIFICANT CHANGE UP (ref 13–44)
LYMPHOCYTES # BLD AUTO: 25.7 % — SIGNIFICANT CHANGE UP (ref 13–44)
LYMPHOCYTES # FLD: 31 % — SIGNIFICANT CHANGE UP
LYMPHOCYTES # FLD: 31 % — SIGNIFICANT CHANGE UP
MAGNESIUM SERPL-MCNC: 1.8 MG/DL — SIGNIFICANT CHANGE UP (ref 1.6–2.6)
MAGNESIUM SERPL-MCNC: 1.8 MG/DL — SIGNIFICANT CHANGE UP (ref 1.6–2.6)
MAGNESIUM SERPL-MCNC: 1.9 MG/DL — SIGNIFICANT CHANGE UP (ref 1.6–2.6)
MAGNESIUM SERPL-MCNC: 2 MG/DL — SIGNIFICANT CHANGE UP (ref 1.6–2.6)
MAGNESIUM SERPL-MCNC: 2 MG/DL — SIGNIFICANT CHANGE UP (ref 1.6–2.6)
MAGNESIUM SERPL-MCNC: 2.1 MG/DL — SIGNIFICANT CHANGE UP (ref 1.6–2.6)
MAGNESIUM SERPL-MCNC: 2.1 MG/DL — SIGNIFICANT CHANGE UP (ref 1.6–2.6)
MAGNESIUM SERPL-MCNC: 2.2 MG/DL — SIGNIFICANT CHANGE UP (ref 1.6–2.6)
MAGNESIUM SERPL-MCNC: 2.2 MG/DL — SIGNIFICANT CHANGE UP (ref 1.6–2.6)
MCHC RBC-ENTMCNC: 31.7 PG — SIGNIFICANT CHANGE UP (ref 27–34)
MCHC RBC-ENTMCNC: 31.7 PG — SIGNIFICANT CHANGE UP (ref 27–34)
MCHC RBC-ENTMCNC: 32 GM/DL — SIGNIFICANT CHANGE UP (ref 32–36)
MCHC RBC-ENTMCNC: 32 GM/DL — SIGNIFICANT CHANGE UP (ref 32–36)
MCHC RBC-ENTMCNC: 32 PG — SIGNIFICANT CHANGE UP (ref 27–34)
MCHC RBC-ENTMCNC: 32.1 GM/DL — SIGNIFICANT CHANGE UP (ref 32–36)
MCHC RBC-ENTMCNC: 32.1 GM/DL — SIGNIFICANT CHANGE UP (ref 32–36)
MCHC RBC-ENTMCNC: 32.1 PG — SIGNIFICANT CHANGE UP (ref 27–34)
MCHC RBC-ENTMCNC: 32.1 PG — SIGNIFICANT CHANGE UP (ref 27–34)
MCHC RBC-ENTMCNC: 32.2 GM/DL — SIGNIFICANT CHANGE UP (ref 32–36)
MCHC RBC-ENTMCNC: 32.2 PG
MCHC RBC-ENTMCNC: 32.2 PG — SIGNIFICANT CHANGE UP (ref 27–34)
MCHC RBC-ENTMCNC: 32.2 PG — SIGNIFICANT CHANGE UP (ref 27–34)
MCHC RBC-ENTMCNC: 32.3 GM/DL
MCHC RBC-ENTMCNC: 32.3 GM/DL — SIGNIFICANT CHANGE UP (ref 32–36)
MCHC RBC-ENTMCNC: 32.3 GM/DL — SIGNIFICANT CHANGE UP (ref 32–36)
MCHC RBC-ENTMCNC: 32.3 PG — SIGNIFICANT CHANGE UP (ref 27–34)
MCHC RBC-ENTMCNC: 32.3 PG — SIGNIFICANT CHANGE UP (ref 27–34)
MCHC RBC-ENTMCNC: 32.4 PG — SIGNIFICANT CHANGE UP (ref 27–34)
MCHC RBC-ENTMCNC: 32.4 PG — SIGNIFICANT CHANGE UP (ref 27–34)
MCHC RBC-ENTMCNC: 32.5 GM/DL — SIGNIFICANT CHANGE UP (ref 32–36)
MCHC RBC-ENTMCNC: 32.5 GM/DL — SIGNIFICANT CHANGE UP (ref 32–36)
MCHC RBC-ENTMCNC: 32.6 PG — SIGNIFICANT CHANGE UP (ref 27–34)
MCHC RBC-ENTMCNC: 32.6 PG — SIGNIFICANT CHANGE UP (ref 27–34)
MCHC RBC-ENTMCNC: 32.8 GM/DL — SIGNIFICANT CHANGE UP (ref 32–36)
MCHC RBC-ENTMCNC: 32.8 GM/DL — SIGNIFICANT CHANGE UP (ref 32–36)
MCHC RBC-ENTMCNC: 32.9 GM/DL — SIGNIFICANT CHANGE UP (ref 32–36)
MCHC RBC-ENTMCNC: 32.9 GM/DL — SIGNIFICANT CHANGE UP (ref 32–36)
MCHC RBC-ENTMCNC: 33.1 GM/DL — SIGNIFICANT CHANGE UP (ref 32–36)
MCHC RBC-ENTMCNC: 33.1 GM/DL — SIGNIFICANT CHANGE UP (ref 32–36)
MCV RBC AUTO: 100.3 FL — HIGH (ref 80–100)
MCV RBC AUTO: 98.4 FL — SIGNIFICANT CHANGE UP (ref 80–100)
MCV RBC AUTO: 98.5 FL — SIGNIFICANT CHANGE UP (ref 80–100)
MCV RBC AUTO: 98.6 FL — SIGNIFICANT CHANGE UP (ref 80–100)
MCV RBC AUTO: 98.6 FL — SIGNIFICANT CHANGE UP (ref 80–100)
MCV RBC AUTO: 99 FL — SIGNIFICANT CHANGE UP (ref 80–100)
MCV RBC AUTO: 99 FL — SIGNIFICANT CHANGE UP (ref 80–100)
MCV RBC AUTO: 99.4 FL — SIGNIFICANT CHANGE UP (ref 80–100)
MCV RBC AUTO: 99.4 FL — SIGNIFICANT CHANGE UP (ref 80–100)
MCV RBC AUTO: 99.7 FL
MONOCYTES # BLD AUTO: 0.4 K/UL — SIGNIFICANT CHANGE UP (ref 0–0.9)
MONOCYTES # BLD AUTO: 0.4 K/UL — SIGNIFICANT CHANGE UP (ref 0–0.9)
MONOCYTES # BLD AUTO: 0.45 K/UL — SIGNIFICANT CHANGE UP (ref 0–0.9)
MONOCYTES # BLD AUTO: 0.45 K/UL — SIGNIFICANT CHANGE UP (ref 0–0.9)
MONOCYTES NFR BLD AUTO: 10.1 % — SIGNIFICANT CHANGE UP (ref 2–14)
MONOCYTES NFR BLD AUTO: 10.1 % — SIGNIFICANT CHANGE UP (ref 2–14)
MONOCYTES NFR BLD AUTO: 9.1 % — SIGNIFICANT CHANGE UP (ref 2–14)
MONOCYTES NFR BLD AUTO: 9.1 % — SIGNIFICANT CHANGE UP (ref 2–14)
MONOS+MACROS # FLD: 26 % — SIGNIFICANT CHANGE UP
MONOS+MACROS # FLD: 26 % — SIGNIFICANT CHANGE UP
NEUTROPHILS # BLD AUTO: 2.28 K/UL — SIGNIFICANT CHANGE UP (ref 1.8–7.4)
NEUTROPHILS # BLD AUTO: 2.28 K/UL — SIGNIFICANT CHANGE UP (ref 1.8–7.4)
NEUTROPHILS # BLD AUTO: 3.29 K/UL — SIGNIFICANT CHANGE UP (ref 1.8–7.4)
NEUTROPHILS # BLD AUTO: 3.29 K/UL — SIGNIFICANT CHANGE UP (ref 1.8–7.4)
NEUTROPHILS NFR BLD AUTO: 57.4 % — SIGNIFICANT CHANGE UP (ref 43–77)
NEUTROPHILS NFR BLD AUTO: 57.4 % — SIGNIFICANT CHANGE UP (ref 43–77)
NEUTROPHILS NFR BLD AUTO: 67 % — SIGNIFICANT CHANGE UP (ref 43–77)
NEUTROPHILS NFR BLD AUTO: 67 % — SIGNIFICANT CHANGE UP (ref 43–77)
NEUTROPHILS-BODY FLUID: 43 % — SIGNIFICANT CHANGE UP
NEUTROPHILS-BODY FLUID: 43 % — SIGNIFICANT CHANGE UP
NITRITE UR-MCNC: NEGATIVE — SIGNIFICANT CHANGE UP
NITRITE UR-MCNC: NEGATIVE — SIGNIFICANT CHANGE UP
NON-GYNECOLOGICAL CYTOLOGY STUDY: SIGNIFICANT CHANGE UP
NRBC # BLD: 0 /100 WBCS — SIGNIFICANT CHANGE UP (ref 0–0)
NT-PROBNP SERPL-SCNC: 2354 PG/ML — HIGH (ref 0–450)
NT-PROBNP SERPL-SCNC: 2354 PG/ML — HIGH (ref 0–450)
PH UR: 6 — SIGNIFICANT CHANGE UP (ref 5–8)
PH UR: 6 — SIGNIFICANT CHANGE UP (ref 5–8)
PHOSPHATE SERPL-MCNC: 2.1 MG/DL — LOW (ref 2.5–4.5)
PHOSPHATE SERPL-MCNC: 2.1 MG/DL — LOW (ref 2.5–4.5)
PHOSPHATE SERPL-MCNC: 2.3 MG/DL — LOW (ref 2.5–4.5)
PHOSPHATE SERPL-MCNC: 2.3 MG/DL — LOW (ref 2.5–4.5)
PHOSPHATE SERPL-MCNC: 2.6 MG/DL — SIGNIFICANT CHANGE UP (ref 2.5–4.5)
PHOSPHATE SERPL-MCNC: 2.6 MG/DL — SIGNIFICANT CHANGE UP (ref 2.5–4.5)
PHOSPHATE SERPL-MCNC: 2.7 MG/DL — SIGNIFICANT CHANGE UP (ref 2.5–4.5)
PHOSPHATE SERPL-MCNC: 2.7 MG/DL — SIGNIFICANT CHANGE UP (ref 2.5–4.5)
PLATELET # BLD AUTO: 109 K/UL — LOW (ref 150–400)
PLATELET # BLD AUTO: 117 K/UL — LOW (ref 150–400)
PLATELET # BLD AUTO: 117 K/UL — LOW (ref 150–400)
PLATELET # BLD AUTO: 126 K/UL — LOW (ref 150–400)
PLATELET # BLD AUTO: 126 K/UL — LOW (ref 150–400)
PLATELET # BLD AUTO: 132 K/UL — LOW (ref 150–400)
PLATELET # BLD AUTO: 132 K/UL — LOW (ref 150–400)
PLATELET # BLD AUTO: 145 K/UL
PLATELET # BLD AUTO: 150 K/UL — SIGNIFICANT CHANGE UP (ref 150–400)
PLATELET # BLD AUTO: 150 K/UL — SIGNIFICANT CHANGE UP (ref 150–400)
PLATELET # BLD AUTO: 154 K/UL — SIGNIFICANT CHANGE UP (ref 150–400)
PLATELET # BLD AUTO: 154 K/UL — SIGNIFICANT CHANGE UP (ref 150–400)
PLATELET # BLD AUTO: 159 K/UL — SIGNIFICANT CHANGE UP (ref 150–400)
PLATELET # BLD AUTO: 159 K/UL — SIGNIFICANT CHANGE UP (ref 150–400)
PLATELET # BLD AUTO: 162 K/UL — SIGNIFICANT CHANGE UP (ref 150–400)
PLATELET # BLD AUTO: 162 K/UL — SIGNIFICANT CHANGE UP (ref 150–400)
POTASSIUM SERPL-MCNC: 3.3 MMOL/L — LOW (ref 3.5–5.3)
POTASSIUM SERPL-MCNC: 3.4 MMOL/L — LOW (ref 3.5–5.3)
POTASSIUM SERPL-MCNC: 3.5 MMOL/L — SIGNIFICANT CHANGE UP (ref 3.5–5.3)
POTASSIUM SERPL-MCNC: 3.6 MMOL/L — SIGNIFICANT CHANGE UP (ref 3.5–5.3)
POTASSIUM SERPL-MCNC: 3.6 MMOL/L — SIGNIFICANT CHANGE UP (ref 3.5–5.3)
POTASSIUM SERPL-MCNC: 3.8 MMOL/L — SIGNIFICANT CHANGE UP (ref 3.5–5.3)
POTASSIUM SERPL-MCNC: 3.8 MMOL/L — SIGNIFICANT CHANGE UP (ref 3.5–5.3)
POTASSIUM SERPL-SCNC: 3.3 MMOL/L — LOW (ref 3.5–5.3)
POTASSIUM SERPL-SCNC: 3.4 MMOL/L — LOW (ref 3.5–5.3)
POTASSIUM SERPL-SCNC: 3.5 MMOL/L — SIGNIFICANT CHANGE UP (ref 3.5–5.3)
POTASSIUM SERPL-SCNC: 3.6 MMOL/L — SIGNIFICANT CHANGE UP (ref 3.5–5.3)
POTASSIUM SERPL-SCNC: 3.6 MMOL/L — SIGNIFICANT CHANGE UP (ref 3.5–5.3)
POTASSIUM SERPL-SCNC: 3.7 MMOL/L
POTASSIUM SERPL-SCNC: 3.8 MMOL/L — SIGNIFICANT CHANGE UP (ref 3.5–5.3)
POTASSIUM SERPL-SCNC: 3.8 MMOL/L — SIGNIFICANT CHANGE UP (ref 3.5–5.3)
POTASSIUM SERPL-SCNC: 3.9 MMOL/L
POTASSIUM SERPL-SCNC: 4.2 MMOL/L
POTASSIUM SERPL-SCNC: 5.1 MMOL/L
PROT FLD-MCNC: 2.1 G/DL — SIGNIFICANT CHANGE UP
PROT FLD-MCNC: 2.1 G/DL — SIGNIFICANT CHANGE UP
PROT SERPL-MCNC: 5.4 G/DL — LOW (ref 6–8.3)
PROT SERPL-MCNC: 5.4 G/DL — LOW (ref 6–8.3)
PROT SERPL-MCNC: 6.2 G/DL
PROT SERPL-MCNC: 6.3 G/DL
PROT SERPL-MCNC: 6.3 G/DL — SIGNIFICANT CHANGE UP (ref 6–8.3)
PROT SERPL-MCNC: 6.3 G/DL — SIGNIFICANT CHANGE UP (ref 6–8.3)
PROT SERPL-MCNC: 6.4 G/DL
PROT SERPL-MCNC: 6.5 G/DL
PROT UR-MCNC: NEGATIVE MG/DL — SIGNIFICANT CHANGE UP
PROT UR-MCNC: NEGATIVE MG/DL — SIGNIFICANT CHANGE UP
PROTHROM AB SERPL-ACNC: 14.2 SEC — HIGH (ref 9.5–13)
PROTHROM AB SERPL-ACNC: 14.2 SEC — HIGH (ref 9.5–13)
PT BLD: 13.1 SEC
PT BLD: 13.8 SEC
PT BLD: 14.6 SEC
RBC # BLD: 2.9 M/UL — LOW (ref 4.2–5.8)
RBC # BLD: 2.9 M/UL — LOW (ref 4.2–5.8)
RBC # BLD: 3.03 M/UL — LOW (ref 4.2–5.8)
RBC # BLD: 3.03 M/UL — LOW (ref 4.2–5.8)
RBC # BLD: 3.21 M/UL — LOW (ref 4.2–5.8)
RBC # BLD: 3.22 M/UL — LOW (ref 4.2–5.8)
RBC # BLD: 3.22 M/UL — LOW (ref 4.2–5.8)
RBC # BLD: 3.25 M/UL — LOW (ref 4.2–5.8)
RBC # BLD: 3.25 M/UL — LOW (ref 4.2–5.8)
RBC # BLD: 3.28 M/UL — LOW (ref 4.2–5.8)
RBC # BLD: 3.28 M/UL — LOW (ref 4.2–5.8)
RBC # BLD: 3.31 M/UL — LOW (ref 4.2–5.8)
RBC # BLD: 3.31 M/UL — LOW (ref 4.2–5.8)
RBC # BLD: 3.67 M/UL — LOW (ref 4.2–5.8)
RBC # BLD: 3.67 M/UL — LOW (ref 4.2–5.8)
RBC # BLD: 3.73 M/UL
RBC # FLD: 15.2 %
RBC # FLD: 15.4 % — HIGH (ref 10.3–14.5)
RBC # FLD: 15.5 % — HIGH (ref 10.3–14.5)
RBC # FLD: 15.6 % — HIGH (ref 10.3–14.5)
RBC # FLD: 15.7 % — HIGH (ref 10.3–14.5)
RBC # FLD: 15.7 % — HIGH (ref 10.3–14.5)
RBC # FLD: 15.8 % — HIGH (ref 10.3–14.5)
RBC # FLD: 15.8 % — HIGH (ref 10.3–14.5)
RBC # FLD: 15.9 % — HIGH (ref 10.3–14.5)
RBC # FLD: 15.9 % — HIGH (ref 10.3–14.5)
RCV VOL RI: 184 /UL — HIGH (ref 0–5)
RCV VOL RI: 184 /UL — HIGH (ref 0–5)
SODIUM SERPL-SCNC: 140 MMOL/L
SODIUM SERPL-SCNC: 140 MMOL/L — SIGNIFICANT CHANGE UP (ref 135–145)
SODIUM SERPL-SCNC: 140 MMOL/L — SIGNIFICANT CHANGE UP (ref 135–145)
SODIUM SERPL-SCNC: 141 MMOL/L — SIGNIFICANT CHANGE UP (ref 135–145)
SODIUM SERPL-SCNC: 142 MMOL/L
SODIUM SERPL-SCNC: 142 MMOL/L
SODIUM SERPL-SCNC: 142 MMOL/L — SIGNIFICANT CHANGE UP (ref 135–145)
SODIUM SERPL-SCNC: 143 MMOL/L — SIGNIFICANT CHANGE UP (ref 135–145)
SODIUM SERPL-SCNC: 144 MMOL/L
SP GR SPEC: 1.01 — SIGNIFICANT CHANGE UP (ref 1–1.03)
SP GR SPEC: 1.01 — SIGNIFICANT CHANGE UP (ref 1–1.03)
SPECIMEN SOURCE: SIGNIFICANT CHANGE UP
TOTAL NUCLEATED CELL COUNT, BODY FLUID: 208 /UL — SIGNIFICANT CHANGE UP
TOTAL NUCLEATED CELL COUNT, BODY FLUID: 208 /UL — SIGNIFICANT CHANGE UP
TROPONIN I, HIGH SENSITIVITY RESULT: 38.9 NG/L — SIGNIFICANT CHANGE UP
TROPONIN I, HIGH SENSITIVITY RESULT: 38.9 NG/L — SIGNIFICANT CHANGE UP
TSH SERPL-MCNC: 0.52 UU/ML — SIGNIFICANT CHANGE UP (ref 0.34–4.82)
TSH SERPL-MCNC: 0.52 UU/ML — SIGNIFICANT CHANGE UP (ref 0.34–4.82)
TUBE TYPE: SIGNIFICANT CHANGE UP
TUBE TYPE: SIGNIFICANT CHANGE UP
UROBILINOGEN FLD QL: 1 MG/DL — SIGNIFICANT CHANGE UP (ref 0.2–1)
UROBILINOGEN FLD QL: 1 MG/DL — SIGNIFICANT CHANGE UP (ref 0.2–1)
WBC # BLD: 3.97 K/UL — SIGNIFICANT CHANGE UP (ref 3.8–10.5)
WBC # BLD: 3.97 K/UL — SIGNIFICANT CHANGE UP (ref 3.8–10.5)
WBC # BLD: 4.52 K/UL — SIGNIFICANT CHANGE UP (ref 3.8–10.5)
WBC # BLD: 4.52 K/UL — SIGNIFICANT CHANGE UP (ref 3.8–10.5)
WBC # BLD: 4.92 K/UL — SIGNIFICANT CHANGE UP (ref 3.8–10.5)
WBC # BLD: 4.92 K/UL — SIGNIFICANT CHANGE UP (ref 3.8–10.5)
WBC # BLD: 5.16 K/UL — SIGNIFICANT CHANGE UP (ref 3.8–10.5)
WBC # BLD: 5.16 K/UL — SIGNIFICANT CHANGE UP (ref 3.8–10.5)
WBC # BLD: 5.81 K/UL — SIGNIFICANT CHANGE UP (ref 3.8–10.5)
WBC # BLD: 5.81 K/UL — SIGNIFICANT CHANGE UP (ref 3.8–10.5)
WBC # BLD: 5.9 K/UL — SIGNIFICANT CHANGE UP (ref 3.8–10.5)
WBC # BLD: 5.9 K/UL — SIGNIFICANT CHANGE UP (ref 3.8–10.5)
WBC # BLD: 6.2 K/UL — SIGNIFICANT CHANGE UP (ref 3.8–10.5)
WBC # BLD: 6.2 K/UL — SIGNIFICANT CHANGE UP (ref 3.8–10.5)
WBC # BLD: 8.57 K/UL — SIGNIFICANT CHANGE UP (ref 3.8–10.5)
WBC # BLD: 8.57 K/UL — SIGNIFICANT CHANGE UP (ref 3.8–10.5)
WBC # BLD: 8.64 K/UL — SIGNIFICANT CHANGE UP (ref 3.8–10.5)
WBC # BLD: 8.64 K/UL — SIGNIFICANT CHANGE UP (ref 3.8–10.5)
WBC # FLD AUTO: 3.97 K/UL — SIGNIFICANT CHANGE UP (ref 3.8–10.5)
WBC # FLD AUTO: 3.97 K/UL — SIGNIFICANT CHANGE UP (ref 3.8–10.5)
WBC # FLD AUTO: 4.52 K/UL — SIGNIFICANT CHANGE UP (ref 3.8–10.5)
WBC # FLD AUTO: 4.52 K/UL — SIGNIFICANT CHANGE UP (ref 3.8–10.5)
WBC # FLD AUTO: 4.92 K/UL — SIGNIFICANT CHANGE UP (ref 3.8–10.5)
WBC # FLD AUTO: 4.92 K/UL — SIGNIFICANT CHANGE UP (ref 3.8–10.5)
WBC # FLD AUTO: 5.16 K/UL — SIGNIFICANT CHANGE UP (ref 3.8–10.5)
WBC # FLD AUTO: 5.16 K/UL — SIGNIFICANT CHANGE UP (ref 3.8–10.5)
WBC # FLD AUTO: 5.81 K/UL — SIGNIFICANT CHANGE UP (ref 3.8–10.5)
WBC # FLD AUTO: 5.81 K/UL — SIGNIFICANT CHANGE UP (ref 3.8–10.5)
WBC # FLD AUTO: 5.9 K/UL — SIGNIFICANT CHANGE UP (ref 3.8–10.5)
WBC # FLD AUTO: 5.9 K/UL — SIGNIFICANT CHANGE UP (ref 3.8–10.5)
WBC # FLD AUTO: 6.07 K/UL
WBC # FLD AUTO: 6.2 K/UL — SIGNIFICANT CHANGE UP (ref 3.8–10.5)
WBC # FLD AUTO: 6.2 K/UL — SIGNIFICANT CHANGE UP (ref 3.8–10.5)
WBC # FLD AUTO: 8.57 K/UL — SIGNIFICANT CHANGE UP (ref 3.8–10.5)
WBC # FLD AUTO: 8.57 K/UL — SIGNIFICANT CHANGE UP (ref 3.8–10.5)
WBC # FLD AUTO: 8.64 K/UL — SIGNIFICANT CHANGE UP (ref 3.8–10.5)
WBC # FLD AUTO: 8.64 K/UL — SIGNIFICANT CHANGE UP (ref 3.8–10.5)

## 2023-01-01 PROCEDURE — 85025 COMPLETE CBC W/AUTO DIFF WBC: CPT

## 2023-01-01 PROCEDURE — 97530 THERAPEUTIC ACTIVITIES: CPT

## 2023-01-01 PROCEDURE — 71046 X-RAY EXAM CHEST 2 VIEWS: CPT

## 2023-01-01 PROCEDURE — 84157 ASSAY OF PROTEIN OTHER: CPT

## 2023-01-01 PROCEDURE — 99233 SBSQ HOSP IP/OBS HIGH 50: CPT

## 2023-01-01 PROCEDURE — 49083 ABD PARACENTESIS W/IMAGING: CPT

## 2023-01-01 PROCEDURE — 99232 SBSQ HOSP IP/OBS MODERATE 35: CPT

## 2023-01-01 PROCEDURE — 70543 MRI ORBT/FAC/NCK W/O &W/DYE: CPT

## 2023-01-01 PROCEDURE — 71045 X-RAY EXAM CHEST 1 VIEW: CPT

## 2023-01-01 PROCEDURE — 84443 ASSAY THYROID STIM HORMONE: CPT

## 2023-01-01 PROCEDURE — 83880 ASSAY OF NATRIURETIC PEPTIDE: CPT

## 2023-01-01 PROCEDURE — C1769: CPT

## 2023-01-01 PROCEDURE — 87075 CULTR BACTERIA EXCEPT BLOOD: CPT

## 2023-01-01 PROCEDURE — 99214 OFFICE O/P EST MOD 30 MIN: CPT

## 2023-01-01 PROCEDURE — 88305 TISSUE EXAM BY PATHOLOGIST: CPT | Mod: 26

## 2023-01-01 PROCEDURE — 99223 1ST HOSP IP/OBS HIGH 75: CPT

## 2023-01-01 PROCEDURE — 88305 TISSUE EXAM BY PATHOLOGIST: CPT

## 2023-01-01 PROCEDURE — 83615 LACTATE (LD) (LDH) ENZYME: CPT

## 2023-01-01 PROCEDURE — 88112 CYTOPATH CELL ENHANCE TECH: CPT | Mod: 26

## 2023-01-01 PROCEDURE — 88341 IMHCHEM/IMCYTCHM EA ADD ANTB: CPT | Mod: 26

## 2023-01-01 PROCEDURE — 85730 THROMBOPLASTIN TIME PARTIAL: CPT

## 2023-01-01 PROCEDURE — 80053 COMPREHEN METABOLIC PANEL: CPT

## 2023-01-01 PROCEDURE — 81003 URINALYSIS AUTO W/O SCOPE: CPT

## 2023-01-01 PROCEDURE — 88112 CYTOPATH CELL ENHANCE TECH: CPT

## 2023-01-01 PROCEDURE — 93880 EXTRACRANIAL BILAT STUDY: CPT

## 2023-01-01 PROCEDURE — 70553 MRI BRAIN STEM W/O & W/DYE: CPT | Mod: 26

## 2023-01-01 PROCEDURE — 99239 HOSP IP/OBS DSCHRG MGMT >30: CPT

## 2023-01-01 PROCEDURE — 76705 ECHO EXAM OF ABDOMEN: CPT | Mod: 26

## 2023-01-01 PROCEDURE — 82945 GLUCOSE OTHER FLUID: CPT

## 2023-01-01 PROCEDURE — 76700 US EXAM ABDOM COMPLETE: CPT

## 2023-01-01 PROCEDURE — 87086 URINE CULTURE/COLONY COUNT: CPT

## 2023-01-01 PROCEDURE — 72170 X-RAY EXAM OF PELVIS: CPT

## 2023-01-01 PROCEDURE — 99214 OFFICE O/P EST MOD 30 MIN: CPT | Mod: 25

## 2023-01-01 PROCEDURE — 74230 X-RAY XM SWLNG FUNCJ C+: CPT | Mod: 26

## 2023-01-01 PROCEDURE — 70450 CT HEAD/BRAIN W/O DYE: CPT | Mod: MA

## 2023-01-01 PROCEDURE — 82962 GLUCOSE BLOOD TEST: CPT

## 2023-01-01 PROCEDURE — 36415 COLL VENOUS BLD VENIPUNCTURE: CPT

## 2023-01-01 PROCEDURE — 82042 OTHER SOURCE ALBUMIN QUAN EA: CPT

## 2023-01-01 PROCEDURE — 89051 BODY FLUID CELL COUNT: CPT

## 2023-01-01 PROCEDURE — 83735 ASSAY OF MAGNESIUM: CPT

## 2023-01-01 PROCEDURE — 92133 CPTRZD OPH DX IMG PST SGM ON: CPT

## 2023-01-01 PROCEDURE — 99223 1ST HOSP IP/OBS HIGH 75: CPT | Mod: 25

## 2023-01-01 PROCEDURE — 71046 X-RAY EXAM CHEST 2 VIEWS: CPT | Mod: 26

## 2023-01-01 PROCEDURE — 99285 EMERGENCY DEPT VISIT HI MDM: CPT

## 2023-01-01 PROCEDURE — 84484 ASSAY OF TROPONIN QUANT: CPT

## 2023-01-01 PROCEDURE — 31579 LARYNGOSCOPY TELESCOPIC: CPT

## 2023-01-01 PROCEDURE — 97162 PT EVAL MOD COMPLEX 30 MIN: CPT

## 2023-01-01 PROCEDURE — 92015 DETERMINE REFRACTIVE STATE: CPT

## 2023-01-01 PROCEDURE — 99498 ADVNCD CARE PLAN ADDL 30 MIN: CPT

## 2023-01-01 PROCEDURE — P9047: CPT

## 2023-01-01 PROCEDURE — 96375 TX/PRO/DX INJ NEW DRUG ADDON: CPT

## 2023-01-01 PROCEDURE — 99342 HOME/RES VST NEW LOW MDM 30: CPT | Mod: 25

## 2023-01-01 PROCEDURE — A9585: CPT

## 2023-01-01 PROCEDURE — 86703 HIV-1/HIV-2 1 RESULT ANTBDY: CPT

## 2023-01-01 PROCEDURE — 93892 TCD EMBOLI DETECT W/O INJ: CPT

## 2023-01-01 PROCEDURE — 70450 CT HEAD/BRAIN W/O DYE: CPT | Mod: 26,MA

## 2023-01-01 PROCEDURE — 99215 OFFICE O/P EST HI 40 MIN: CPT

## 2023-01-01 PROCEDURE — 72170 X-RAY EXAM OF PELVIS: CPT | Mod: 26

## 2023-01-01 PROCEDURE — 31575 DIAGNOSTIC LARYNGOSCOPY: CPT

## 2023-01-01 PROCEDURE — 84100 ASSAY OF PHOSPHORUS: CPT

## 2023-01-01 PROCEDURE — 96374 THER/PROPH/DIAG INJ IV PUSH: CPT

## 2023-01-01 PROCEDURE — G0506: CPT

## 2023-01-01 PROCEDURE — 70543 MRI ORBT/FAC/NCK W/O &W/DYE: CPT | Mod: 26

## 2023-01-01 PROCEDURE — 93886 INTRACRANIAL COMPLETE STUDY: CPT

## 2023-01-01 PROCEDURE — 88342 IMHCHEM/IMCYTCHM 1ST ANTB: CPT

## 2023-01-01 PROCEDURE — 72125 CT NECK SPINE W/O DYE: CPT | Mod: MA

## 2023-01-01 PROCEDURE — 99223 1ST HOSP IP/OBS HIGH 75: CPT | Mod: GC

## 2023-01-01 PROCEDURE — 71250 CT THORAX DX C-: CPT | Mod: 26

## 2023-01-01 PROCEDURE — 99497 ADVNCD CARE PLAN 30 MIN: CPT | Mod: 25

## 2023-01-01 PROCEDURE — 93005 ELECTROCARDIOGRAM TRACING: CPT

## 2023-01-01 PROCEDURE — 87070 CULTURE OTHR SPECIMN AEROBIC: CPT

## 2023-01-01 PROCEDURE — 85027 COMPLETE CBC AUTOMATED: CPT

## 2023-01-01 PROCEDURE — 80048 BASIC METABOLIC PNL TOTAL CA: CPT

## 2023-01-01 PROCEDURE — 99233 SBSQ HOSP IP/OBS HIGH 50: CPT | Mod: 25

## 2023-01-01 PROCEDURE — 85610 PROTHROMBIN TIME: CPT

## 2023-01-01 PROCEDURE — 97116 GAIT TRAINING THERAPY: CPT

## 2023-01-01 PROCEDURE — 99497 ADVNCD CARE PLAN 30 MIN: CPT

## 2023-01-01 PROCEDURE — 88342 IMHCHEM/IMCYTCHM 1ST ANTB: CPT | Mod: 26

## 2023-01-01 PROCEDURE — 76700 US EXAM ABDOM COMPLETE: CPT | Mod: 26

## 2023-01-01 PROCEDURE — 71250 CT THORAX DX C-: CPT

## 2023-01-01 PROCEDURE — 87205 SMEAR GRAM STAIN: CPT

## 2023-01-01 PROCEDURE — 76942 ECHO GUIDE FOR BIOPSY: CPT

## 2023-01-01 PROCEDURE — 76705 ECHO EXAM OF ABDOMEN: CPT

## 2023-01-01 PROCEDURE — 72125 CT NECK SPINE W/O DYE: CPT | Mod: 26,MA

## 2023-01-01 PROCEDURE — 70553 MRI BRAIN STEM W/O & W/DYE: CPT

## 2023-01-01 PROCEDURE — 97110 THERAPEUTIC EXERCISES: CPT

## 2023-01-01 PROCEDURE — C1729: CPT

## 2023-01-01 PROCEDURE — 88341 IMHCHEM/IMCYTCHM EA ADD ANTB: CPT

## 2023-01-01 PROCEDURE — 92083 EXTENDED VISUAL FIELD XM: CPT

## 2023-01-01 RX ORDER — SODIUM,POTASSIUM PHOSPHATES 278-250MG
2 POWDER IN PACKET (EA) ORAL
Refills: 0 | Status: COMPLETED | OUTPATIENT
Start: 2023-01-01 | End: 2023-01-01

## 2023-01-01 RX ORDER — POTASSIUM CHLORIDE 20 MEQ
40 PACKET (EA) ORAL ONCE
Refills: 0 | Status: COMPLETED | OUTPATIENT
Start: 2023-01-01 | End: 2023-01-01

## 2023-01-01 RX ORDER — THIAMINE MONONITRATE (VIT B1) 100 MG
1 TABLET ORAL
Qty: 30 | Refills: 0
Start: 2023-01-01 | End: 2024-01-01

## 2023-01-01 RX ORDER — ROBINUL 0.2 MG/ML
1 INJECTION INTRAMUSCULAR; INTRAVENOUS
Refills: 0 | Status: DISCONTINUED | OUTPATIENT
Start: 2023-01-01 | End: 2023-01-01

## 2023-01-01 RX ORDER — FUROSEMIDE 40 MG
1 TABLET ORAL
Refills: 0 | DISCHARGE

## 2023-01-01 RX ORDER — BIMATOPROST 0.1 MG/ML
SOLUTION/ DROPS OPHTHALMIC
Refills: 0 | Status: COMPLETED | COMMUNITY
End: 2023-01-01

## 2023-01-01 RX ORDER — LANOLIN ALCOHOL/MO/W.PET/CERES
3 CREAM (GRAM) TOPICAL AT BEDTIME
Refills: 0 | Status: COMPLETED | OUTPATIENT
Start: 2023-01-01 | End: 2023-01-01

## 2023-01-01 RX ORDER — FUROSEMIDE 40 MG
40 TABLET ORAL DAILY
Refills: 0 | Status: DISCONTINUED | OUTPATIENT
Start: 2023-01-01 | End: 2023-01-01

## 2023-01-01 RX ORDER — FLUDROCORTISONE ACETATE 0.1 MG/1
0.2 TABLET ORAL DAILY
Refills: 0 | Status: DISCONTINUED | OUTPATIENT
Start: 2023-01-01 | End: 2023-01-01

## 2023-01-01 RX ORDER — ATORVASTATIN CALCIUM 80 MG/1
40 TABLET, FILM COATED ORAL AT BEDTIME
Refills: 0 | Status: DISCONTINUED | OUTPATIENT
Start: 2023-01-01 | End: 2023-01-01

## 2023-01-01 RX ORDER — ATORVASTATIN CALCIUM 80 MG/1
1 TABLET, FILM COATED ORAL
Qty: 0 | Refills: 0 | DISCHARGE

## 2023-01-01 RX ORDER — MIDODRINE HYDROCHLORIDE 2.5 MG/1
20 TABLET ORAL THREE TIMES A DAY
Refills: 0 | Status: DISCONTINUED | OUTPATIENT
Start: 2023-01-01 | End: 2023-01-01

## 2023-01-01 RX ORDER — DROXIDOPA 100 MG/1
100 CAPSULE ORAL EVERY 8 HOURS
Refills: 0 | Status: DISCONTINUED | OUTPATIENT
Start: 2023-01-01 | End: 2023-01-01

## 2023-01-01 RX ORDER — FLUDROCORTISONE ACETATE 0.1 MG/1
0.2 TABLET ORAL EVERY 12 HOURS
Refills: 0 | Status: DISCONTINUED | OUTPATIENT
Start: 2023-01-01 | End: 2023-01-01

## 2023-01-01 RX ORDER — DORZOLAMIDE HYDROCHLORIDE 20 MG/ML
1 SOLUTION/ DROPS OPHTHALMIC
Qty: 1 | Refills: 0
Start: 2023-01-01 | End: 2024-01-01

## 2023-01-01 RX ORDER — ASPIRIN/CALCIUM CARB/MAGNESIUM 324 MG
1 TABLET ORAL
Qty: 30 | Refills: 0
Start: 2023-01-01 | End: 2024-01-01

## 2023-01-01 RX ORDER — ASPIRIN/CALCIUM CARB/MAGNESIUM 324 MG
1 TABLET ORAL
Qty: 0 | Refills: 0 | DISCHARGE

## 2023-01-01 RX ORDER — FLUDROCORTISONE ACETATE 0.1 MG/1
1 TABLET ORAL
Qty: 0 | Refills: 0 | DISCHARGE

## 2023-01-01 RX ORDER — FLUDROCORTISONE ACETATE 0.1 MG/1
0.1 TABLET ORAL
Qty: 210 | Refills: 5 | Status: DISCONTINUED | COMMUNITY
Start: 2023-01-01 | End: 2023-01-01

## 2023-01-01 RX ORDER — BIMATOPROST 0.3 MG/ML
1 SOLUTION/ DROPS OPHTHALMIC
Qty: 0 | Refills: 0 | DISCHARGE

## 2023-01-01 RX ORDER — CEFTRIAXONE 500 MG/1
1000 INJECTION, POWDER, FOR SOLUTION INTRAMUSCULAR; INTRAVENOUS EVERY 24 HOURS
Refills: 0 | Status: COMPLETED | OUTPATIENT
Start: 2023-01-01 | End: 2023-01-01

## 2023-01-01 RX ORDER — NYSTATIN CREAM 100000 [USP'U]/G
1 CREAM TOPICAL EVERY 12 HOURS
Refills: 0 | Status: DISCONTINUED | OUTPATIENT
Start: 2023-01-01 | End: 2023-01-01

## 2023-01-01 RX ORDER — SENNA PLUS 8.6 MG/1
2 TABLET ORAL AT BEDTIME
Refills: 0 | Status: DISCONTINUED | OUTPATIENT
Start: 2023-01-01 | End: 2023-01-01

## 2023-01-01 RX ORDER — FLUTICASONE PROPIONATE 220 UG/1
220 AEROSOL, METERED RESPIRATORY (INHALATION) TWICE DAILY
Qty: 1 | Refills: 4 | Status: DISCONTINUED | COMMUNITY
Start: 2023-01-01 | End: 2023-01-01

## 2023-01-01 RX ORDER — ASPIRIN/CALCIUM CARB/MAGNESIUM 324 MG
81 TABLET ORAL DAILY
Refills: 0 | Status: DISCONTINUED | OUTPATIENT
Start: 2023-01-01 | End: 2023-01-01

## 2023-01-01 RX ORDER — FUROSEMIDE 40 MG
20 TABLET ORAL DAILY
Refills: 0 | Status: DISCONTINUED | OUTPATIENT
Start: 2023-01-01 | End: 2023-01-01

## 2023-01-01 RX ORDER — LATANOPROST 0.05 MG/ML
1 SOLUTION/ DROPS OPHTHALMIC; TOPICAL
Qty: 1 | Refills: 0
Start: 2023-01-01 | End: 2024-01-01

## 2023-01-01 RX ORDER — CEFTRIAXONE 500 MG/1
1000 INJECTION, POWDER, FOR SOLUTION INTRAMUSCULAR; INTRAVENOUS ONCE
Refills: 0 | Status: COMPLETED | OUTPATIENT
Start: 2023-01-01 | End: 2023-01-01

## 2023-01-01 RX ORDER — FUROSEMIDE 40 MG
1 TABLET ORAL
Qty: 30 | Refills: 0
Start: 2023-01-01 | End: 2024-01-01

## 2023-01-01 RX ORDER — PREDNISONE 10 MG/1
10 TABLET ORAL
Qty: 42 | Refills: 0 | Status: DISCONTINUED | COMMUNITY
Start: 2023-01-01 | End: 2023-01-01

## 2023-01-01 RX ORDER — FLUTICASONE PROPIONATE 100 UG/1
100 POWDER, METERED RESPIRATORY (INHALATION) TWICE DAILY
Qty: 1 | Refills: 5 | Status: DISCONTINUED | COMMUNITY
Start: 2023-01-01 | End: 2023-01-01

## 2023-01-01 RX ORDER — BECLOMETHASONE DIPROPIONATE HFA 80 UG/1
80 AEROSOL, METERED RESPIRATORY (INHALATION) TWICE DAILY
Qty: 1 | Refills: 5 | Status: DISCONTINUED | COMMUNITY
Start: 2023-01-01 | End: 2023-01-01

## 2023-01-01 RX ORDER — DROXIDOPA 100 MG/1
1 CAPSULE ORAL
Qty: 60 | Refills: 0
Start: 2023-01-01 | End: 2024-01-01

## 2023-01-01 RX ORDER — MIDODRINE HYDROCHLORIDE 2.5 MG/1
30 TABLET ORAL THREE TIMES A DAY
Refills: 0 | Status: DISCONTINUED | OUTPATIENT
Start: 2023-01-01 | End: 2023-01-01

## 2023-01-01 RX ORDER — DROXIDOPA 100 MG/1
100 CAPSULE ORAL THREE TIMES A DAY
Refills: 0 | Status: DISCONTINUED | OUTPATIENT
Start: 2023-01-01 | End: 2023-01-01

## 2023-01-01 RX ORDER — SENNA PLUS 8.6 MG/1
2 TABLET ORAL
Qty: 60 | Refills: 0
Start: 2023-01-01 | End: 2024-01-01

## 2023-01-01 RX ORDER — DORZOLAMIDE HYDROCHLORIDE 20 MG/ML
1 SOLUTION/ DROPS OPHTHALMIC
Qty: 0 | Refills: 0 | DISCHARGE

## 2023-01-01 RX ORDER — MULTIVITAMIN
TABLET ORAL
Refills: 0 | Status: COMPLETED | COMMUNITY
End: 2023-01-01

## 2023-01-01 RX ORDER — LATANOPROST 0.05 MG/ML
1 SOLUTION/ DROPS OPHTHALMIC; TOPICAL
Refills: 0 | DISCHARGE

## 2023-01-01 RX ORDER — ATORVASTATIN CALCIUM 80 MG/1
1 TABLET, FILM COATED ORAL
Qty: 30 | Refills: 0
Start: 2023-01-01 | End: 2024-01-01

## 2023-01-01 RX ORDER — MIDODRINE HYDROCHLORIDE 2.5 MG/1
2 TABLET ORAL
Qty: 180 | Refills: 0
Start: 2023-01-01 | End: 2024-01-01

## 2023-01-01 RX ORDER — FOLIC ACID 0.8 MG
1 TABLET ORAL DAILY
Refills: 0 | Status: DISCONTINUED | OUTPATIENT
Start: 2023-01-01 | End: 2023-01-01

## 2023-01-01 RX ORDER — MIDODRINE HYDROCHLORIDE 2.5 MG/1
1 TABLET ORAL
Qty: 0 | Refills: 0 | DISCHARGE

## 2023-01-01 RX ORDER — FOLIC ACID 0.8 MG
1 TABLET ORAL
Qty: 30 | Refills: 0
Start: 2023-01-01 | End: 2024-01-01

## 2023-01-01 RX ORDER — SPIRONOLACTONE 50 MG/1
50 TABLET ORAL DAILY
Qty: 30 | Refills: 1 | Status: COMPLETED | COMMUNITY
Start: 2023-01-01 | End: 2023-01-01

## 2023-01-01 RX ORDER — LANOLIN ALCOHOL/MO/W.PET/CERES
5 CREAM (GRAM) TOPICAL AT BEDTIME
Refills: 0 | Status: DISCONTINUED | OUTPATIENT
Start: 2023-01-01 | End: 2023-01-01

## 2023-01-01 RX ORDER — AZITHROMYCIN 500 MG/1
500 TABLET, FILM COATED ORAL ONCE
Refills: 0 | Status: COMPLETED | OUTPATIENT
Start: 2023-01-01 | End: 2023-01-01

## 2023-01-01 RX ORDER — ROBINUL 0.2 MG/ML
1 INJECTION INTRAMUSCULAR; INTRAVENOUS
Refills: 0 | DISCHARGE

## 2023-01-01 RX ORDER — POTASSIUM CHLORIDE 10 MEQ
10 CAPSULE, EXTENDED RELEASE ORAL
Refills: 0 | Status: DISCONTINUED | COMMUNITY
End: 2023-01-01

## 2023-01-01 RX ORDER — HYDRALAZINE HCL 50 MG
5 TABLET ORAL ONCE
Refills: 0 | Status: COMPLETED | OUTPATIENT
Start: 2023-01-01 | End: 2023-01-01

## 2023-01-01 RX ORDER — ROBINUL 0.2 MG/ML
1 INJECTION INTRAMUSCULAR; INTRAVENOUS
Qty: 60 | Refills: 0
Start: 2023-01-01 | End: 2024-01-01

## 2023-01-01 RX ORDER — POTASSIUM CHLORIDE 20 MEQ
20 PACKET (EA) ORAL ONCE
Refills: 0 | Status: COMPLETED | OUTPATIENT
Start: 2023-01-01 | End: 2023-01-01

## 2023-01-01 RX ORDER — POTASSIUM CHLORIDE 20 MEQ
10 PACKET (EA) ORAL
Refills: 0 | Status: COMPLETED | OUTPATIENT
Start: 2023-01-01 | End: 2023-01-01

## 2023-01-01 RX ORDER — DORZOLAMIDE HYDROCHLORIDE 20 MG/ML
1 SOLUTION/ DROPS OPHTHALMIC THREE TIMES A DAY
Refills: 0 | Status: DISCONTINUED | OUTPATIENT
Start: 2023-01-01 | End: 2023-01-01

## 2023-01-01 RX ORDER — ONDANSETRON 8 MG/1
4 TABLET, FILM COATED ORAL EVERY 8 HOURS
Refills: 0 | Status: DISCONTINUED | OUTPATIENT
Start: 2023-01-01 | End: 2023-01-01

## 2023-01-01 RX ORDER — LATANOPROST 0.05 MG/ML
1 SOLUTION/ DROPS OPHTHALMIC; TOPICAL AT BEDTIME
Refills: 0 | Status: DISCONTINUED | OUTPATIENT
Start: 2023-01-01 | End: 2023-01-01

## 2023-01-01 RX ORDER — FLUDROCORTISONE ACETATE 0.1 MG/1
0.1 TABLET ORAL DAILY
Refills: 0 | Status: DISCONTINUED | OUTPATIENT
Start: 2023-01-01 | End: 2023-01-01

## 2023-01-01 RX ORDER — DROXIDOPA 100 MG/1
200 CAPSULE ORAL THREE TIMES A DAY
Refills: 0 | Status: DISCONTINUED | OUTPATIENT
Start: 2023-01-01 | End: 2023-01-01

## 2023-01-01 RX ORDER — DROXIDOPA 100 MG/1
100 CAPSULE ORAL
Refills: 0 | Status: DISCONTINUED | OUTPATIENT
Start: 2023-01-01 | End: 2023-01-01

## 2023-01-01 RX ORDER — THIAMINE MONONITRATE (VIT B1) 100 MG
100 TABLET ORAL DAILY
Refills: 0 | Status: DISCONTINUED | OUTPATIENT
Start: 2023-01-01 | End: 2023-01-01

## 2023-01-01 RX ORDER — PANTOPRAZOLE SODIUM 20 MG/1
40 TABLET, DELAYED RELEASE ORAL
Refills: 0 | Status: DISCONTINUED | OUTPATIENT
Start: 2023-01-01 | End: 2023-01-01

## 2023-01-01 RX ORDER — SODIUM CHLORIDE 9 MG/ML
1000 INJECTION INTRAMUSCULAR; INTRAVENOUS; SUBCUTANEOUS ONCE
Refills: 0 | Status: COMPLETED | OUTPATIENT
Start: 2023-01-01 | End: 2023-01-01

## 2023-01-01 RX ORDER — ENOXAPARIN SODIUM 100 MG/ML
40 INJECTION SUBCUTANEOUS EVERY 24 HOURS
Refills: 0 | Status: DISCONTINUED | OUTPATIENT
Start: 2023-01-01 | End: 2023-01-01

## 2023-01-01 RX ORDER — MIDODRINE HYDROCHLORIDE 2.5 MG/1
3 TABLET ORAL
Refills: 0 | DISCHARGE

## 2023-01-01 RX ORDER — FLUDROCORTISONE ACETATE 0.1 MG/1
0.1 TABLET ORAL EVERY 12 HOURS
Refills: 0 | Status: DISCONTINUED | OUTPATIENT
Start: 2023-01-01 | End: 2023-01-01

## 2023-01-01 RX ADMIN — DORZOLAMIDE HYDROCHLORIDE 1 DROP(S): 20 SOLUTION/ DROPS OPHTHALMIC at 21:43

## 2023-01-01 RX ADMIN — PANTOPRAZOLE SODIUM 40 MILLIGRAM(S): 20 TABLET, DELAYED RELEASE ORAL at 05:36

## 2023-01-01 RX ADMIN — Medication 81 MILLIGRAM(S): at 12:59

## 2023-01-01 RX ADMIN — Medication 40 MILLIEQUIVALENT(S): at 10:00

## 2023-01-01 RX ADMIN — ATORVASTATIN CALCIUM 40 MILLIGRAM(S): 80 TABLET, FILM COATED ORAL at 21:40

## 2023-01-01 RX ADMIN — Medication 1 MILLIGRAM(S): at 12:36

## 2023-01-01 RX ADMIN — FLUDROCORTISONE ACETATE 0.2 MILLIGRAM(S): 0.1 TABLET ORAL at 05:54

## 2023-01-01 RX ADMIN — Medication 5 MILLIGRAM(S): at 21:04

## 2023-01-01 RX ADMIN — SENNA PLUS 2 TABLET(S): 8.6 TABLET ORAL at 21:42

## 2023-01-01 RX ADMIN — Medication 100 MILLIGRAM(S): at 13:09

## 2023-01-01 RX ADMIN — Medication 20 MILLIEQUIVALENT(S): at 18:51

## 2023-01-01 RX ADMIN — Medication 1 MILLIGRAM(S): at 12:41

## 2023-01-01 RX ADMIN — MIDODRINE HYDROCHLORIDE 20 MILLIGRAM(S): 2.5 TABLET ORAL at 05:57

## 2023-01-01 RX ADMIN — MIDODRINE HYDROCHLORIDE 20 MILLIGRAM(S): 2.5 TABLET ORAL at 05:29

## 2023-01-01 RX ADMIN — DROXIDOPA 100 MILLIGRAM(S): 100 CAPSULE ORAL at 05:43

## 2023-01-01 RX ADMIN — Medication 100 MILLIGRAM(S): at 12:54

## 2023-01-01 RX ADMIN — LATANOPROST 1 DROP(S): 0.05 SOLUTION/ DROPS OPHTHALMIC; TOPICAL at 22:19

## 2023-01-01 RX ADMIN — PANTOPRAZOLE SODIUM 40 MILLIGRAM(S): 20 TABLET, DELAYED RELEASE ORAL at 05:44

## 2023-01-01 RX ADMIN — Medication 100 MILLIGRAM(S): at 13:08

## 2023-01-01 RX ADMIN — Medication 100 MILLIGRAM(S): at 12:36

## 2023-01-01 RX ADMIN — MIDODRINE HYDROCHLORIDE 20 MILLIGRAM(S): 2.5 TABLET ORAL at 13:10

## 2023-01-01 RX ADMIN — MIDODRINE HYDROCHLORIDE 20 MILLIGRAM(S): 2.5 TABLET ORAL at 06:11

## 2023-01-01 RX ADMIN — Medication 1 MILLIGRAM(S): at 13:09

## 2023-01-01 RX ADMIN — DROXIDOPA 100 MILLIGRAM(S): 100 CAPSULE ORAL at 06:15

## 2023-01-01 RX ADMIN — DORZOLAMIDE HYDROCHLORIDE 1 DROP(S): 20 SOLUTION/ DROPS OPHTHALMIC at 21:38

## 2023-01-01 RX ADMIN — Medication 40 MILLIEQUIVALENT(S): at 12:28

## 2023-01-01 RX ADMIN — ENOXAPARIN SODIUM 40 MILLIGRAM(S): 100 INJECTION SUBCUTANEOUS at 06:12

## 2023-01-01 RX ADMIN — DORZOLAMIDE HYDROCHLORIDE 1 DROP(S): 20 SOLUTION/ DROPS OPHTHALMIC at 13:14

## 2023-01-01 RX ADMIN — ATORVASTATIN CALCIUM 40 MILLIGRAM(S): 80 TABLET, FILM COATED ORAL at 22:28

## 2023-01-01 RX ADMIN — DORZOLAMIDE HYDROCHLORIDE 1 DROP(S): 20 SOLUTION/ DROPS OPHTHALMIC at 05:33

## 2023-01-01 RX ADMIN — Medication 100 MILLIGRAM(S): at 12:17

## 2023-01-01 RX ADMIN — ENOXAPARIN SODIUM 40 MILLIGRAM(S): 100 INJECTION SUBCUTANEOUS at 05:36

## 2023-01-01 RX ADMIN — FLUDROCORTISONE ACETATE 0.1 MILLIGRAM(S): 0.1 TABLET ORAL at 06:02

## 2023-01-01 RX ADMIN — NYSTATIN CREAM 1 APPLICATION(S): 100000 CREAM TOPICAL at 05:55

## 2023-01-01 RX ADMIN — DORZOLAMIDE HYDROCHLORIDE 1 DROP(S): 20 SOLUTION/ DROPS OPHTHALMIC at 13:31

## 2023-01-01 RX ADMIN — DORZOLAMIDE HYDROCHLORIDE 1 DROP(S): 20 SOLUTION/ DROPS OPHTHALMIC at 22:28

## 2023-01-01 RX ADMIN — MIDODRINE HYDROCHLORIDE 20 MILLIGRAM(S): 2.5 TABLET ORAL at 05:37

## 2023-01-01 RX ADMIN — DROXIDOPA 100 MILLIGRAM(S): 100 CAPSULE ORAL at 16:02

## 2023-01-01 RX ADMIN — Medication 20 MILLIGRAM(S): at 06:03

## 2023-01-01 RX ADMIN — LATANOPROST 1 DROP(S): 0.05 SOLUTION/ DROPS OPHTHALMIC; TOPICAL at 22:29

## 2023-01-01 RX ADMIN — DORZOLAMIDE HYDROCHLORIDE 1 DROP(S): 20 SOLUTION/ DROPS OPHTHALMIC at 22:39

## 2023-01-01 RX ADMIN — DORZOLAMIDE HYDROCHLORIDE 1 DROP(S): 20 SOLUTION/ DROPS OPHTHALMIC at 14:39

## 2023-01-01 RX ADMIN — ATORVASTATIN CALCIUM 40 MILLIGRAM(S): 80 TABLET, FILM COATED ORAL at 22:39

## 2023-01-01 RX ADMIN — ROBINUL 1 MILLIGRAM(S): 0.2 INJECTION INTRAMUSCULAR; INTRAVENOUS at 05:47

## 2023-01-01 RX ADMIN — MIDODRINE HYDROCHLORIDE 20 MILLIGRAM(S): 2.5 TABLET ORAL at 18:05

## 2023-01-01 RX ADMIN — ENOXAPARIN SODIUM 40 MILLIGRAM(S): 100 INJECTION SUBCUTANEOUS at 06:08

## 2023-01-01 RX ADMIN — NYSTATIN CREAM 1 APPLICATION(S): 100000 CREAM TOPICAL at 06:57

## 2023-01-01 RX ADMIN — LATANOPROST 1 DROP(S): 0.05 SOLUTION/ DROPS OPHTHALMIC; TOPICAL at 21:54

## 2023-01-01 RX ADMIN — DORZOLAMIDE HYDROCHLORIDE 1 DROP(S): 20 SOLUTION/ DROPS OPHTHALMIC at 14:15

## 2023-01-01 RX ADMIN — Medication 100 MILLIGRAM(S): at 12:13

## 2023-01-01 RX ADMIN — MIDODRINE HYDROCHLORIDE 20 MILLIGRAM(S): 2.5 TABLET ORAL at 06:02

## 2023-01-01 RX ADMIN — Medication 3 MILLIGRAM(S): at 21:42

## 2023-01-01 RX ADMIN — PANTOPRAZOLE SODIUM 40 MILLIGRAM(S): 20 TABLET, DELAYED RELEASE ORAL at 05:42

## 2023-01-01 RX ADMIN — Medication 40 MILLIEQUIVALENT(S): at 15:51

## 2023-01-01 RX ADMIN — MIDODRINE HYDROCHLORIDE 20 MILLIGRAM(S): 2.5 TABLET ORAL at 12:06

## 2023-01-01 RX ADMIN — Medication 1 MILLIGRAM(S): at 13:03

## 2023-01-01 RX ADMIN — DROXIDOPA 100 MILLIGRAM(S): 100 CAPSULE ORAL at 15:15

## 2023-01-01 RX ADMIN — NYSTATIN CREAM 1 APPLICATION(S): 100000 CREAM TOPICAL at 05:20

## 2023-01-01 RX ADMIN — LATANOPROST 1 DROP(S): 0.05 SOLUTION/ DROPS OPHTHALMIC; TOPICAL at 21:41

## 2023-01-01 RX ADMIN — DROXIDOPA 200 MILLIGRAM(S): 100 CAPSULE ORAL at 05:59

## 2023-01-01 RX ADMIN — MIDODRINE HYDROCHLORIDE 20 MILLIGRAM(S): 2.5 TABLET ORAL at 17:37

## 2023-01-01 RX ADMIN — ROBINUL 1 MILLIGRAM(S): 0.2 INJECTION INTRAMUSCULAR; INTRAVENOUS at 17:31

## 2023-01-01 RX ADMIN — DORZOLAMIDE HYDROCHLORIDE 1 DROP(S): 20 SOLUTION/ DROPS OPHTHALMIC at 05:56

## 2023-01-01 RX ADMIN — ROBINUL 1 MILLIGRAM(S): 0.2 INJECTION INTRAMUSCULAR; INTRAVENOUS at 17:27

## 2023-01-01 RX ADMIN — DORZOLAMIDE HYDROCHLORIDE 1 DROP(S): 20 SOLUTION/ DROPS OPHTHALMIC at 13:24

## 2023-01-01 RX ADMIN — DORZOLAMIDE HYDROCHLORIDE 1 DROP(S): 20 SOLUTION/ DROPS OPHTHALMIC at 05:29

## 2023-01-01 RX ADMIN — ROBINUL 1 MILLIGRAM(S): 0.2 INJECTION INTRAMUSCULAR; INTRAVENOUS at 17:37

## 2023-01-01 RX ADMIN — FLUDROCORTISONE ACETATE 0.1 MILLIGRAM(S): 0.1 TABLET ORAL at 05:35

## 2023-01-01 RX ADMIN — ATORVASTATIN CALCIUM 40 MILLIGRAM(S): 80 TABLET, FILM COATED ORAL at 21:38

## 2023-01-01 RX ADMIN — Medication 100 MILLIGRAM(S): at 12:14

## 2023-01-01 RX ADMIN — ENOXAPARIN SODIUM 40 MILLIGRAM(S): 100 INJECTION SUBCUTANEOUS at 05:22

## 2023-01-01 RX ADMIN — MIDODRINE HYDROCHLORIDE 20 MILLIGRAM(S): 2.5 TABLET ORAL at 12:19

## 2023-01-01 RX ADMIN — SENNA PLUS 2 TABLET(S): 8.6 TABLET ORAL at 21:18

## 2023-01-01 RX ADMIN — AZITHROMYCIN 255 MILLIGRAM(S): 500 TABLET, FILM COATED ORAL at 16:21

## 2023-01-01 RX ADMIN — LATANOPROST 1 DROP(S): 0.05 SOLUTION/ DROPS OPHTHALMIC; TOPICAL at 21:53

## 2023-01-01 RX ADMIN — ROBINUL 1 MILLIGRAM(S): 0.2 INJECTION INTRAMUSCULAR; INTRAVENOUS at 05:54

## 2023-01-01 RX ADMIN — FLUDROCORTISONE ACETATE 0.2 MILLIGRAM(S): 0.1 TABLET ORAL at 05:37

## 2023-01-01 RX ADMIN — ROBINUL 1 MILLIGRAM(S): 0.2 INJECTION INTRAMUSCULAR; INTRAVENOUS at 05:19

## 2023-01-01 RX ADMIN — SENNA PLUS 2 TABLET(S): 8.6 TABLET ORAL at 21:05

## 2023-01-01 RX ADMIN — MIDODRINE HYDROCHLORIDE 20 MILLIGRAM(S): 2.5 TABLET ORAL at 05:19

## 2023-01-01 RX ADMIN — PANTOPRAZOLE SODIUM 40 MILLIGRAM(S): 20 TABLET, DELAYED RELEASE ORAL at 05:22

## 2023-01-01 RX ADMIN — ATORVASTATIN CALCIUM 40 MILLIGRAM(S): 80 TABLET, FILM COATED ORAL at 21:13

## 2023-01-01 RX ADMIN — DORZOLAMIDE HYDROCHLORIDE 1 DROP(S): 20 SOLUTION/ DROPS OPHTHALMIC at 21:13

## 2023-01-01 RX ADMIN — Medication 20 MILLIGRAM(S): at 13:30

## 2023-01-01 RX ADMIN — SENNA PLUS 2 TABLET(S): 8.6 TABLET ORAL at 21:40

## 2023-01-01 RX ADMIN — MIDODRINE HYDROCHLORIDE 20 MILLIGRAM(S): 2.5 TABLET ORAL at 17:11

## 2023-01-01 RX ADMIN — Medication 81 MILLIGRAM(S): at 12:06

## 2023-01-01 RX ADMIN — MIDODRINE HYDROCHLORIDE 20 MILLIGRAM(S): 2.5 TABLET ORAL at 12:07

## 2023-01-01 RX ADMIN — ROBINUL 1 MILLIGRAM(S): 0.2 INJECTION INTRAMUSCULAR; INTRAVENOUS at 05:30

## 2023-01-01 RX ADMIN — Medication 20 MILLIGRAM(S): at 05:23

## 2023-01-01 RX ADMIN — Medication 81 MILLIGRAM(S): at 13:23

## 2023-01-01 RX ADMIN — NYSTATIN CREAM 1 APPLICATION(S): 100000 CREAM TOPICAL at 18:03

## 2023-01-01 RX ADMIN — DORZOLAMIDE HYDROCHLORIDE 1 DROP(S): 20 SOLUTION/ DROPS OPHTHALMIC at 05:53

## 2023-01-01 RX ADMIN — Medication 100 MILLIGRAM(S): at 13:23

## 2023-01-01 RX ADMIN — Medication 100 MILLIGRAM(S): at 12:53

## 2023-01-01 RX ADMIN — DORZOLAMIDE HYDROCHLORIDE 1 DROP(S): 20 SOLUTION/ DROPS OPHTHALMIC at 06:08

## 2023-01-01 RX ADMIN — Medication 1 MILLIGRAM(S): at 12:18

## 2023-01-01 RX ADMIN — ENOXAPARIN SODIUM 40 MILLIGRAM(S): 100 INJECTION SUBCUTANEOUS at 06:57

## 2023-01-01 RX ADMIN — ENOXAPARIN SODIUM 40 MILLIGRAM(S): 100 INJECTION SUBCUTANEOUS at 05:42

## 2023-01-01 RX ADMIN — NYSTATIN CREAM 1 APPLICATION(S): 100000 CREAM TOPICAL at 17:38

## 2023-01-01 RX ADMIN — MIDODRINE HYDROCHLORIDE 20 MILLIGRAM(S): 2.5 TABLET ORAL at 06:09

## 2023-01-01 RX ADMIN — ATORVASTATIN CALCIUM 40 MILLIGRAM(S): 80 TABLET, FILM COATED ORAL at 21:21

## 2023-01-01 RX ADMIN — Medication 5 MILLIGRAM(S): at 21:18

## 2023-01-01 RX ADMIN — LATANOPROST 1 DROP(S): 0.05 SOLUTION/ DROPS OPHTHALMIC; TOPICAL at 21:05

## 2023-01-01 RX ADMIN — CEFTRIAXONE 100 MILLIGRAM(S): 500 INJECTION, POWDER, FOR SOLUTION INTRAMUSCULAR; INTRAVENOUS at 23:58

## 2023-01-01 RX ADMIN — Medication 5 MILLIGRAM(S): at 21:47

## 2023-01-01 RX ADMIN — MIDODRINE HYDROCHLORIDE 20 MILLIGRAM(S): 2.5 TABLET ORAL at 12:36

## 2023-01-01 RX ADMIN — DROXIDOPA 100 MILLIGRAM(S): 100 CAPSULE ORAL at 17:52

## 2023-01-01 RX ADMIN — FLUDROCORTISONE ACETATE 0.2 MILLIGRAM(S): 0.1 TABLET ORAL at 05:58

## 2023-01-01 RX ADMIN — Medication 81 MILLIGRAM(S): at 12:34

## 2023-01-01 RX ADMIN — ENOXAPARIN SODIUM 40 MILLIGRAM(S): 100 INJECTION SUBCUTANEOUS at 05:20

## 2023-01-01 RX ADMIN — Medication 2 PACKET(S): at 06:13

## 2023-01-01 RX ADMIN — SENNA PLUS 2 TABLET(S): 8.6 TABLET ORAL at 21:10

## 2023-01-01 RX ADMIN — NYSTATIN CREAM 1 APPLICATION(S): 100000 CREAM TOPICAL at 18:47

## 2023-01-01 RX ADMIN — LATANOPROST 1 DROP(S): 0.05 SOLUTION/ DROPS OPHTHALMIC; TOPICAL at 22:40

## 2023-01-01 RX ADMIN — DORZOLAMIDE HYDROCHLORIDE 1 DROP(S): 20 SOLUTION/ DROPS OPHTHALMIC at 05:42

## 2023-01-01 RX ADMIN — ROBINUL 1 MILLIGRAM(S): 0.2 INJECTION INTRAMUSCULAR; INTRAVENOUS at 17:57

## 2023-01-01 RX ADMIN — SENNA PLUS 2 TABLET(S): 8.6 TABLET ORAL at 21:54

## 2023-01-01 RX ADMIN — DORZOLAMIDE HYDROCHLORIDE 1 DROP(S): 20 SOLUTION/ DROPS OPHTHALMIC at 22:19

## 2023-01-01 RX ADMIN — DORZOLAMIDE HYDROCHLORIDE 1 DROP(S): 20 SOLUTION/ DROPS OPHTHALMIC at 05:23

## 2023-01-01 RX ADMIN — MIDODRINE HYDROCHLORIDE 20 MILLIGRAM(S): 2.5 TABLET ORAL at 13:04

## 2023-01-01 RX ADMIN — CEFTRIAXONE 100 MILLIGRAM(S): 500 INJECTION, POWDER, FOR SOLUTION INTRAMUSCULAR; INTRAVENOUS at 22:12

## 2023-01-01 RX ADMIN — LATANOPROST 1 DROP(S): 0.05 SOLUTION/ DROPS OPHTHALMIC; TOPICAL at 21:37

## 2023-01-01 RX ADMIN — Medication 81 MILLIGRAM(S): at 13:45

## 2023-01-01 RX ADMIN — Medication 81 MILLIGRAM(S): at 13:08

## 2023-01-01 RX ADMIN — ATORVASTATIN CALCIUM 40 MILLIGRAM(S): 80 TABLET, FILM COATED ORAL at 21:47

## 2023-01-01 RX ADMIN — DROXIDOPA 100 MILLIGRAM(S): 100 CAPSULE ORAL at 21:15

## 2023-01-01 RX ADMIN — FLUDROCORTISONE ACETATE 0.1 MILLIGRAM(S): 0.1 TABLET ORAL at 05:23

## 2023-01-01 RX ADMIN — SENNA PLUS 2 TABLET(S): 8.6 TABLET ORAL at 22:28

## 2023-01-01 RX ADMIN — MIDODRINE HYDROCHLORIDE 20 MILLIGRAM(S): 2.5 TABLET ORAL at 12:35

## 2023-01-01 RX ADMIN — Medication 81 MILLIGRAM(S): at 12:17

## 2023-01-01 RX ADMIN — Medication 1 MILLIGRAM(S): at 12:54

## 2023-01-01 RX ADMIN — DROXIDOPA 200 MILLIGRAM(S): 100 CAPSULE ORAL at 17:55

## 2023-01-01 RX ADMIN — ENOXAPARIN SODIUM 40 MILLIGRAM(S): 100 INJECTION SUBCUTANEOUS at 05:23

## 2023-01-01 RX ADMIN — DORZOLAMIDE HYDROCHLORIDE 1 DROP(S): 20 SOLUTION/ DROPS OPHTHALMIC at 06:56

## 2023-01-01 RX ADMIN — ROBINUL 1 MILLIGRAM(S): 0.2 INJECTION INTRAMUSCULAR; INTRAVENOUS at 18:53

## 2023-01-01 RX ADMIN — Medication 1 MILLIGRAM(S): at 12:19

## 2023-01-01 RX ADMIN — Medication 100 MILLIGRAM(S): at 12:19

## 2023-01-01 RX ADMIN — CEFTRIAXONE 100 MILLIGRAM(S): 500 INJECTION, POWDER, FOR SOLUTION INTRAMUSCULAR; INTRAVENOUS at 23:00

## 2023-01-01 RX ADMIN — Medication 20 MILLIGRAM(S): at 06:11

## 2023-01-01 RX ADMIN — MIDODRINE HYDROCHLORIDE 20 MILLIGRAM(S): 2.5 TABLET ORAL at 18:13

## 2023-01-01 RX ADMIN — Medication 81 MILLIGRAM(S): at 12:20

## 2023-01-01 RX ADMIN — ROBINUL 1 MILLIGRAM(S): 0.2 INJECTION INTRAMUSCULAR; INTRAVENOUS at 22:28

## 2023-01-01 RX ADMIN — FLUDROCORTISONE ACETATE 0.2 MILLIGRAM(S): 0.1 TABLET ORAL at 05:47

## 2023-01-01 RX ADMIN — DROXIDOPA 100 MILLIGRAM(S): 100 CAPSULE ORAL at 17:45

## 2023-01-01 RX ADMIN — ENOXAPARIN SODIUM 40 MILLIGRAM(S): 100 INJECTION SUBCUTANEOUS at 05:19

## 2023-01-01 RX ADMIN — Medication 1 MILLIGRAM(S): at 12:50

## 2023-01-01 RX ADMIN — DORZOLAMIDE HYDROCHLORIDE 1 DROP(S): 20 SOLUTION/ DROPS OPHTHALMIC at 16:25

## 2023-01-01 RX ADMIN — ROBINUL 1 MILLIGRAM(S): 0.2 INJECTION INTRAMUSCULAR; INTRAVENOUS at 05:53

## 2023-01-01 RX ADMIN — ENOXAPARIN SODIUM 40 MILLIGRAM(S): 100 INJECTION SUBCUTANEOUS at 05:30

## 2023-01-01 RX ADMIN — PANTOPRAZOLE SODIUM 40 MILLIGRAM(S): 20 TABLET, DELAYED RELEASE ORAL at 06:02

## 2023-01-01 RX ADMIN — DORZOLAMIDE HYDROCHLORIDE 1 DROP(S): 20 SOLUTION/ DROPS OPHTHALMIC at 05:47

## 2023-01-01 RX ADMIN — NYSTATIN CREAM 1 APPLICATION(S): 100000 CREAM TOPICAL at 05:44

## 2023-01-01 RX ADMIN — DORZOLAMIDE HYDROCHLORIDE 1 DROP(S): 20 SOLUTION/ DROPS OPHTHALMIC at 21:09

## 2023-01-01 RX ADMIN — LATANOPROST 1 DROP(S): 0.05 SOLUTION/ DROPS OPHTHALMIC; TOPICAL at 21:38

## 2023-01-01 RX ADMIN — PANTOPRAZOLE SODIUM 40 MILLIGRAM(S): 20 TABLET, DELAYED RELEASE ORAL at 05:19

## 2023-01-01 RX ADMIN — ROBINUL 1 MILLIGRAM(S): 0.2 INJECTION INTRAMUSCULAR; INTRAVENOUS at 05:34

## 2023-01-01 RX ADMIN — SENNA PLUS 2 TABLET(S): 8.6 TABLET ORAL at 21:38

## 2023-01-01 RX ADMIN — DORZOLAMIDE HYDROCHLORIDE 1 DROP(S): 20 SOLUTION/ DROPS OPHTHALMIC at 13:56

## 2023-01-01 RX ADMIN — Medication 5 MILLIGRAM(S): at 21:53

## 2023-01-01 RX ADMIN — DORZOLAMIDE HYDROCHLORIDE 1 DROP(S): 20 SOLUTION/ DROPS OPHTHALMIC at 15:37

## 2023-01-01 RX ADMIN — Medication 3 MILLIGRAM(S): at 22:19

## 2023-01-01 RX ADMIN — DROXIDOPA 200 MILLIGRAM(S): 100 CAPSULE ORAL at 15:46

## 2023-01-01 RX ADMIN — ROBINUL 1 MILLIGRAM(S): 0.2 INJECTION INTRAMUSCULAR; INTRAVENOUS at 18:39

## 2023-01-01 RX ADMIN — Medication 100 MILLIEQUIVALENT(S): at 14:42

## 2023-01-01 RX ADMIN — Medication 20 MILLIGRAM(S): at 13:07

## 2023-01-01 RX ADMIN — DORZOLAMIDE HYDROCHLORIDE 1 DROP(S): 20 SOLUTION/ DROPS OPHTHALMIC at 22:46

## 2023-01-01 RX ADMIN — PANTOPRAZOLE SODIUM 40 MILLIGRAM(S): 20 TABLET, DELAYED RELEASE ORAL at 06:56

## 2023-01-01 RX ADMIN — MIDODRINE HYDROCHLORIDE 30 MILLIGRAM(S): 2.5 TABLET ORAL at 09:58

## 2023-01-01 RX ADMIN — MIDODRINE HYDROCHLORIDE 20 MILLIGRAM(S): 2.5 TABLET ORAL at 05:22

## 2023-01-01 RX ADMIN — DORZOLAMIDE HYDROCHLORIDE 1 DROP(S): 20 SOLUTION/ DROPS OPHTHALMIC at 05:36

## 2023-01-01 RX ADMIN — MIDODRINE HYDROCHLORIDE 20 MILLIGRAM(S): 2.5 TABLET ORAL at 05:54

## 2023-01-01 RX ADMIN — Medication 20 MILLIGRAM(S): at 05:33

## 2023-01-01 RX ADMIN — Medication 5 MILLIGRAM(S): at 21:45

## 2023-01-01 RX ADMIN — LATANOPROST 1 DROP(S): 0.05 SOLUTION/ DROPS OPHTHALMIC; TOPICAL at 21:10

## 2023-01-01 RX ADMIN — Medication 81 MILLIGRAM(S): at 12:53

## 2023-01-01 RX ADMIN — LATANOPROST 1 DROP(S): 0.05 SOLUTION/ DROPS OPHTHALMIC; TOPICAL at 22:10

## 2023-01-01 RX ADMIN — FLUDROCORTISONE ACETATE 0.2 MILLIGRAM(S): 0.1 TABLET ORAL at 18:38

## 2023-01-01 RX ADMIN — NYSTATIN CREAM 1 APPLICATION(S): 100000 CREAM TOPICAL at 17:32

## 2023-01-01 RX ADMIN — SENNA PLUS 2 TABLET(S): 8.6 TABLET ORAL at 22:21

## 2023-01-01 RX ADMIN — Medication 100 MILLIGRAM(S): at 12:08

## 2023-01-01 RX ADMIN — ATORVASTATIN CALCIUM 40 MILLIGRAM(S): 80 TABLET, FILM COATED ORAL at 21:43

## 2023-01-01 RX ADMIN — Medication 1 MILLIGRAM(S): at 13:35

## 2023-01-01 RX ADMIN — DROXIDOPA 200 MILLIGRAM(S): 100 CAPSULE ORAL at 06:51

## 2023-01-01 RX ADMIN — DORZOLAMIDE HYDROCHLORIDE 1 DROP(S): 20 SOLUTION/ DROPS OPHTHALMIC at 21:54

## 2023-01-01 RX ADMIN — SENNA PLUS 2 TABLET(S): 8.6 TABLET ORAL at 21:21

## 2023-01-01 RX ADMIN — SENNA PLUS 2 TABLET(S): 8.6 TABLET ORAL at 21:17

## 2023-01-01 RX ADMIN — ROBINUL 1 MILLIGRAM(S): 0.2 INJECTION INTRAMUSCULAR; INTRAVENOUS at 05:43

## 2023-01-01 RX ADMIN — Medication 81 MILLIGRAM(S): at 12:24

## 2023-01-01 RX ADMIN — Medication 5 MILLIGRAM(S): at 21:38

## 2023-01-01 RX ADMIN — Medication 81 MILLIGRAM(S): at 12:40

## 2023-01-01 RX ADMIN — NYSTATIN CREAM 1 APPLICATION(S): 100000 CREAM TOPICAL at 17:11

## 2023-01-01 RX ADMIN — DROXIDOPA 100 MILLIGRAM(S): 100 CAPSULE ORAL at 21:55

## 2023-01-01 RX ADMIN — Medication 1 MILLIGRAM(S): at 13:22

## 2023-01-01 RX ADMIN — ATORVASTATIN CALCIUM 40 MILLIGRAM(S): 80 TABLET, FILM COATED ORAL at 22:09

## 2023-01-01 RX ADMIN — MIDODRINE HYDROCHLORIDE 20 MILLIGRAM(S): 2.5 TABLET ORAL at 12:54

## 2023-01-01 RX ADMIN — DROXIDOPA 100 MILLIGRAM(S): 100 CAPSULE ORAL at 05:20

## 2023-01-01 RX ADMIN — MIDODRINE HYDROCHLORIDE 20 MILLIGRAM(S): 2.5 TABLET ORAL at 17:27

## 2023-01-01 RX ADMIN — MIDODRINE HYDROCHLORIDE 20 MILLIGRAM(S): 2.5 TABLET ORAL at 12:47

## 2023-01-01 RX ADMIN — MIDODRINE HYDROCHLORIDE 20 MILLIGRAM(S): 2.5 TABLET ORAL at 12:53

## 2023-01-01 RX ADMIN — CEFTRIAXONE 100 MILLIGRAM(S): 500 INJECTION, POWDER, FOR SOLUTION INTRAMUSCULAR; INTRAVENOUS at 22:19

## 2023-01-01 RX ADMIN — Medication 5 MILLIGRAM(S): at 23:34

## 2023-01-01 RX ADMIN — ATORVASTATIN CALCIUM 40 MILLIGRAM(S): 80 TABLET, FILM COATED ORAL at 21:05

## 2023-01-01 RX ADMIN — Medication 81 MILLIGRAM(S): at 12:14

## 2023-01-01 RX ADMIN — Medication 100 MILLIGRAM(S): at 12:41

## 2023-01-01 RX ADMIN — DORZOLAMIDE HYDROCHLORIDE 1 DROP(S): 20 SOLUTION/ DROPS OPHTHALMIC at 15:15

## 2023-01-01 RX ADMIN — MIDODRINE HYDROCHLORIDE 20 MILLIGRAM(S): 2.5 TABLET ORAL at 05:33

## 2023-01-01 RX ADMIN — DORZOLAMIDE HYDROCHLORIDE 1 DROP(S): 20 SOLUTION/ DROPS OPHTHALMIC at 15:02

## 2023-01-01 RX ADMIN — PANTOPRAZOLE SODIUM 40 MILLIGRAM(S): 20 TABLET, DELAYED RELEASE ORAL at 05:55

## 2023-01-01 RX ADMIN — NYSTATIN CREAM 1 APPLICATION(S): 100000 CREAM TOPICAL at 17:52

## 2023-01-01 RX ADMIN — ROBINUL 1 MILLIGRAM(S): 0.2 INJECTION INTRAMUSCULAR; INTRAVENOUS at 17:22

## 2023-01-01 RX ADMIN — NYSTATIN CREAM 1 APPLICATION(S): 100000 CREAM TOPICAL at 06:09

## 2023-01-01 RX ADMIN — ROBINUL 1 MILLIGRAM(S): 0.2 INJECTION INTRAMUSCULAR; INTRAVENOUS at 17:09

## 2023-01-01 RX ADMIN — MIDODRINE HYDROCHLORIDE 20 MILLIGRAM(S): 2.5 TABLET ORAL at 17:08

## 2023-01-01 RX ADMIN — Medication 100 MILLIGRAM(S): at 13:03

## 2023-01-01 RX ADMIN — PANTOPRAZOLE SODIUM 40 MILLIGRAM(S): 20 TABLET, DELAYED RELEASE ORAL at 06:11

## 2023-01-01 RX ADMIN — NYSTATIN CREAM 1 APPLICATION(S): 100000 CREAM TOPICAL at 17:57

## 2023-01-01 RX ADMIN — LATANOPROST 1 DROP(S): 0.05 SOLUTION/ DROPS OPHTHALMIC; TOPICAL at 21:17

## 2023-01-01 RX ADMIN — CEFTRIAXONE 100 MILLIGRAM(S): 500 INJECTION, POWDER, FOR SOLUTION INTRAMUSCULAR; INTRAVENOUS at 22:46

## 2023-01-01 RX ADMIN — DORZOLAMIDE HYDROCHLORIDE 1 DROP(S): 20 SOLUTION/ DROPS OPHTHALMIC at 21:17

## 2023-01-01 RX ADMIN — DORZOLAMIDE HYDROCHLORIDE 1 DROP(S): 20 SOLUTION/ DROPS OPHTHALMIC at 17:36

## 2023-01-01 RX ADMIN — ROBINUL 1 MILLIGRAM(S): 0.2 INJECTION INTRAMUSCULAR; INTRAVENOUS at 17:07

## 2023-01-01 RX ADMIN — MIDODRINE HYDROCHLORIDE 20 MILLIGRAM(S): 2.5 TABLET ORAL at 05:46

## 2023-01-01 RX ADMIN — MIDODRINE HYDROCHLORIDE 30 MILLIGRAM(S): 2.5 TABLET ORAL at 17:20

## 2023-01-01 RX ADMIN — LATANOPROST 1 DROP(S): 0.05 SOLUTION/ DROPS OPHTHALMIC; TOPICAL at 21:43

## 2023-01-01 RX ADMIN — FLUDROCORTISONE ACETATE 0.2 MILLIGRAM(S): 0.1 TABLET ORAL at 17:31

## 2023-01-01 RX ADMIN — DROXIDOPA 100 MILLIGRAM(S): 100 CAPSULE ORAL at 17:11

## 2023-01-01 RX ADMIN — DORZOLAMIDE HYDROCHLORIDE 1 DROP(S): 20 SOLUTION/ DROPS OPHTHALMIC at 22:10

## 2023-01-01 RX ADMIN — Medication 100 MILLIGRAM(S): at 12:06

## 2023-01-01 RX ADMIN — FLUDROCORTISONE ACETATE 0.2 MILLIGRAM(S): 0.1 TABLET ORAL at 12:54

## 2023-01-01 RX ADMIN — Medication 1 MILLIGRAM(S): at 12:53

## 2023-01-01 RX ADMIN — Medication 1 MILLIGRAM(S): at 11:50

## 2023-01-01 RX ADMIN — DROXIDOPA 100 MILLIGRAM(S): 100 CAPSULE ORAL at 21:29

## 2023-01-01 RX ADMIN — DROXIDOPA 100 MILLIGRAM(S): 100 CAPSULE ORAL at 12:58

## 2023-01-01 RX ADMIN — DROXIDOPA 100 MILLIGRAM(S): 100 CAPSULE ORAL at 07:09

## 2023-01-01 RX ADMIN — ROBINUL 1 MILLIGRAM(S): 0.2 INJECTION INTRAMUSCULAR; INTRAVENOUS at 05:36

## 2023-01-01 RX ADMIN — ATORVASTATIN CALCIUM 40 MILLIGRAM(S): 80 TABLET, FILM COATED ORAL at 21:54

## 2023-01-01 RX ADMIN — SODIUM CHLORIDE 1000 MILLILITER(S): 9 INJECTION INTRAMUSCULAR; INTRAVENOUS; SUBCUTANEOUS at 13:41

## 2023-01-01 RX ADMIN — MIDODRINE HYDROCHLORIDE 30 MILLIGRAM(S): 2.5 TABLET ORAL at 13:23

## 2023-01-01 RX ADMIN — LATANOPROST 1 DROP(S): 0.05 SOLUTION/ DROPS OPHTHALMIC; TOPICAL at 21:47

## 2023-01-01 RX ADMIN — NYSTATIN CREAM 1 APPLICATION(S): 100000 CREAM TOPICAL at 06:11

## 2023-01-01 RX ADMIN — PANTOPRAZOLE SODIUM 40 MILLIGRAM(S): 20 TABLET, DELAYED RELEASE ORAL at 06:07

## 2023-01-01 RX ADMIN — MIDODRINE HYDROCHLORIDE 20 MILLIGRAM(S): 2.5 TABLET ORAL at 05:41

## 2023-01-01 RX ADMIN — ROBINUL 1 MILLIGRAM(S): 0.2 INJECTION INTRAMUSCULAR; INTRAVENOUS at 05:20

## 2023-01-01 RX ADMIN — CEFTRIAXONE 100 MILLIGRAM(S): 500 INJECTION, POWDER, FOR SOLUTION INTRAMUSCULAR; INTRAVENOUS at 16:21

## 2023-01-01 RX ADMIN — DROXIDOPA 100 MILLIGRAM(S): 100 CAPSULE ORAL at 05:22

## 2023-01-01 RX ADMIN — DORZOLAMIDE HYDROCHLORIDE 1 DROP(S): 20 SOLUTION/ DROPS OPHTHALMIC at 13:03

## 2023-01-01 RX ADMIN — PANTOPRAZOLE SODIUM 40 MILLIGRAM(S): 20 TABLET, DELAYED RELEASE ORAL at 05:49

## 2023-01-01 RX ADMIN — ROBINUL 1 MILLIGRAM(S): 0.2 INJECTION INTRAMUSCULAR; INTRAVENOUS at 05:22

## 2023-01-01 RX ADMIN — DORZOLAMIDE HYDROCHLORIDE 1 DROP(S): 20 SOLUTION/ DROPS OPHTHALMIC at 05:24

## 2023-01-01 RX ADMIN — Medication 1 MILLIGRAM(S): at 14:38

## 2023-01-01 RX ADMIN — FLUDROCORTISONE ACETATE 0.1 MILLIGRAM(S): 0.1 TABLET ORAL at 05:30

## 2023-01-01 RX ADMIN — Medication 100 MILLIEQUIVALENT(S): at 17:23

## 2023-01-01 RX ADMIN — DORZOLAMIDE HYDROCHLORIDE 1 DROP(S): 20 SOLUTION/ DROPS OPHTHALMIC at 21:48

## 2023-01-01 RX ADMIN — Medication 100 MILLIGRAM(S): at 13:13

## 2023-01-01 RX ADMIN — ENOXAPARIN SODIUM 40 MILLIGRAM(S): 100 INJECTION SUBCUTANEOUS at 06:02

## 2023-01-01 RX ADMIN — NYSTATIN CREAM 1 APPLICATION(S): 100000 CREAM TOPICAL at 05:48

## 2023-01-01 RX ADMIN — NYSTATIN CREAM 1 APPLICATION(S): 100000 CREAM TOPICAL at 17:09

## 2023-01-01 RX ADMIN — DORZOLAMIDE HYDROCHLORIDE 1 DROP(S): 20 SOLUTION/ DROPS OPHTHALMIC at 21:41

## 2023-01-01 RX ADMIN — DROXIDOPA 100 MILLIGRAM(S): 100 CAPSULE ORAL at 18:04

## 2023-01-01 RX ADMIN — ENOXAPARIN SODIUM 40 MILLIGRAM(S): 100 INJECTION SUBCUTANEOUS at 05:56

## 2023-01-01 RX ADMIN — LATANOPROST 1 DROP(S): 0.05 SOLUTION/ DROPS OPHTHALMIC; TOPICAL at 21:13

## 2023-01-01 RX ADMIN — DORZOLAMIDE HYDROCHLORIDE 1 DROP(S): 20 SOLUTION/ DROPS OPHTHALMIC at 21:23

## 2023-01-01 RX ADMIN — SENNA PLUS 2 TABLET(S): 8.6 TABLET ORAL at 22:10

## 2023-01-01 RX ADMIN — ROBINUL 1 MILLIGRAM(S): 0.2 INJECTION INTRAMUSCULAR; INTRAVENOUS at 17:47

## 2023-01-01 RX ADMIN — LATANOPROST 1 DROP(S): 0.05 SOLUTION/ DROPS OPHTHALMIC; TOPICAL at 21:23

## 2023-01-01 RX ADMIN — MIDODRINE HYDROCHLORIDE 20 MILLIGRAM(S): 2.5 TABLET ORAL at 12:13

## 2023-01-01 RX ADMIN — FLUDROCORTISONE ACETATE 0.1 MILLIGRAM(S): 0.1 TABLET ORAL at 06:10

## 2023-01-01 RX ADMIN — NYSTATIN CREAM 1 APPLICATION(S): 100000 CREAM TOPICAL at 18:53

## 2023-01-01 RX ADMIN — MIDODRINE HYDROCHLORIDE 30 MILLIGRAM(S): 2.5 TABLET ORAL at 05:53

## 2023-01-01 RX ADMIN — ROBINUL 1 MILLIGRAM(S): 0.2 INJECTION INTRAMUSCULAR; INTRAVENOUS at 18:04

## 2023-01-01 RX ADMIN — Medication 1 MILLIGRAM(S): at 12:58

## 2023-01-01 RX ADMIN — DORZOLAMIDE HYDROCHLORIDE 1 DROP(S): 20 SOLUTION/ DROPS OPHTHALMIC at 21:37

## 2023-01-01 RX ADMIN — Medication 5 MILLIGRAM(S): at 17:55

## 2023-01-01 RX ADMIN — DORZOLAMIDE HYDROCHLORIDE 1 DROP(S): 20 SOLUTION/ DROPS OPHTHALMIC at 21:07

## 2023-01-01 RX ADMIN — MIDODRINE HYDROCHLORIDE 20 MILLIGRAM(S): 2.5 TABLET ORAL at 13:12

## 2023-01-01 RX ADMIN — DORZOLAMIDE HYDROCHLORIDE 1 DROP(S): 20 SOLUTION/ DROPS OPHTHALMIC at 21:21

## 2023-01-01 RX ADMIN — Medication 1 MILLIGRAM(S): at 13:08

## 2023-01-01 RX ADMIN — ENOXAPARIN SODIUM 40 MILLIGRAM(S): 100 INJECTION SUBCUTANEOUS at 05:28

## 2023-01-01 RX ADMIN — ENOXAPARIN SODIUM 40 MILLIGRAM(S): 100 INJECTION SUBCUTANEOUS at 05:48

## 2023-01-01 RX ADMIN — Medication 100 MILLIGRAM(S): at 12:37

## 2023-01-01 RX ADMIN — LATANOPROST 1 DROP(S): 0.05 SOLUTION/ DROPS OPHTHALMIC; TOPICAL at 22:46

## 2023-01-01 RX ADMIN — DORZOLAMIDE HYDROCHLORIDE 1 DROP(S): 20 SOLUTION/ DROPS OPHTHALMIC at 15:44

## 2023-01-01 RX ADMIN — Medication 20 MILLIGRAM(S): at 06:08

## 2023-01-01 RX ADMIN — DROXIDOPA 100 MILLIGRAM(S): 100 CAPSULE ORAL at 05:26

## 2023-01-01 RX ADMIN — DORZOLAMIDE HYDROCHLORIDE 1 DROP(S): 20 SOLUTION/ DROPS OPHTHALMIC at 05:31

## 2023-01-01 RX ADMIN — NYSTATIN CREAM 1 APPLICATION(S): 100000 CREAM TOPICAL at 17:07

## 2023-01-01 RX ADMIN — ROBINUL 1 MILLIGRAM(S): 0.2 INJECTION INTRAMUSCULAR; INTRAVENOUS at 06:11

## 2023-01-01 RX ADMIN — LATANOPROST 1 DROP(S): 0.05 SOLUTION/ DROPS OPHTHALMIC; TOPICAL at 21:21

## 2023-01-01 RX ADMIN — MIDODRINE HYDROCHLORIDE 20 MILLIGRAM(S): 2.5 TABLET ORAL at 05:43

## 2023-01-01 RX ADMIN — SENNA PLUS 2 TABLET(S): 8.6 TABLET ORAL at 22:40

## 2023-01-01 RX ADMIN — DORZOLAMIDE HYDROCHLORIDE 1 DROP(S): 20 SOLUTION/ DROPS OPHTHALMIC at 05:20

## 2023-01-01 RX ADMIN — ROBINUL 1 MILLIGRAM(S): 0.2 INJECTION INTRAMUSCULAR; INTRAVENOUS at 17:51

## 2023-01-01 RX ADMIN — NYSTATIN CREAM 1 APPLICATION(S): 100000 CREAM TOPICAL at 18:39

## 2023-01-01 RX ADMIN — ROBINUL 1 MILLIGRAM(S): 0.2 INJECTION INTRAMUSCULAR; INTRAVENOUS at 05:40

## 2023-01-01 RX ADMIN — Medication 1 MILLIGRAM(S): at 12:35

## 2023-01-01 RX ADMIN — ATORVASTATIN CALCIUM 40 MILLIGRAM(S): 80 TABLET, FILM COATED ORAL at 21:10

## 2023-01-01 RX ADMIN — DORZOLAMIDE HYDROCHLORIDE 1 DROP(S): 20 SOLUTION/ DROPS OPHTHALMIC at 13:08

## 2023-01-01 RX ADMIN — DORZOLAMIDE HYDROCHLORIDE 1 DROP(S): 20 SOLUTION/ DROPS OPHTHALMIC at 14:38

## 2023-01-01 RX ADMIN — Medication 2 PACKET(S): at 17:28

## 2023-01-01 RX ADMIN — NYSTATIN CREAM 1 APPLICATION(S): 100000 CREAM TOPICAL at 05:23

## 2023-01-01 RX ADMIN — Medication 81 MILLIGRAM(S): at 12:36

## 2023-01-01 RX ADMIN — DROXIDOPA 200 MILLIGRAM(S): 100 CAPSULE ORAL at 12:52

## 2023-01-01 RX ADMIN — DORZOLAMIDE HYDROCHLORIDE 1 DROP(S): 20 SOLUTION/ DROPS OPHTHALMIC at 06:07

## 2023-01-01 RX ADMIN — NYSTATIN CREAM 1 APPLICATION(S): 100000 CREAM TOPICAL at 05:45

## 2023-01-01 RX ADMIN — DORZOLAMIDE HYDROCHLORIDE 1 DROP(S): 20 SOLUTION/ DROPS OPHTHALMIC at 05:18

## 2023-01-01 RX ADMIN — Medication 1 MILLIGRAM(S): at 13:12

## 2023-01-01 RX ADMIN — MIDODRINE HYDROCHLORIDE 20 MILLIGRAM(S): 2.5 TABLET ORAL at 06:08

## 2023-01-01 RX ADMIN — FLUDROCORTISONE ACETATE 0.1 MILLIGRAM(S): 0.1 TABLET ORAL at 17:20

## 2023-01-01 RX ADMIN — Medication 100 MILLIGRAM(S): at 12:30

## 2023-01-01 RX ADMIN — DROXIDOPA 100 MILLIGRAM(S): 100 CAPSULE ORAL at 06:10

## 2023-01-01 RX ADMIN — SENNA PLUS 2 TABLET(S): 8.6 TABLET ORAL at 21:47

## 2023-01-01 RX ADMIN — Medication 1 MILLIGRAM(S): at 12:06

## 2023-01-01 RX ADMIN — DROXIDOPA 100 MILLIGRAM(S): 100 CAPSULE ORAL at 13:56

## 2023-01-01 RX ADMIN — Medication 81 MILLIGRAM(S): at 11:50

## 2023-01-01 RX ADMIN — DORZOLAMIDE HYDROCHLORIDE 1 DROP(S): 20 SOLUTION/ DROPS OPHTHALMIC at 15:18

## 2023-01-01 RX ADMIN — NYSTATIN CREAM 1 APPLICATION(S): 100000 CREAM TOPICAL at 06:01

## 2023-01-01 RX ADMIN — MIDODRINE HYDROCHLORIDE 20 MILLIGRAM(S): 2.5 TABLET ORAL at 05:23

## 2023-01-01 RX ADMIN — DORZOLAMIDE HYDROCHLORIDE 1 DROP(S): 20 SOLUTION/ DROPS OPHTHALMIC at 05:40

## 2023-01-01 RX ADMIN — ATORVASTATIN CALCIUM 40 MILLIGRAM(S): 80 TABLET, FILM COATED ORAL at 21:17

## 2023-01-01 RX ADMIN — MIDODRINE HYDROCHLORIDE 30 MILLIGRAM(S): 2.5 TABLET ORAL at 05:30

## 2023-01-01 RX ADMIN — DORZOLAMIDE HYDROCHLORIDE 1 DROP(S): 20 SOLUTION/ DROPS OPHTHALMIC at 06:02

## 2023-01-01 RX ADMIN — DORZOLAMIDE HYDROCHLORIDE 1 DROP(S): 20 SOLUTION/ DROPS OPHTHALMIC at 13:00

## 2023-01-01 RX ADMIN — ROBINUL 1 MILLIGRAM(S): 0.2 INJECTION INTRAMUSCULAR; INTRAVENOUS at 17:17

## 2023-01-01 RX ADMIN — MIDODRINE HYDROCHLORIDE 20 MILLIGRAM(S): 2.5 TABLET ORAL at 06:56

## 2023-01-01 RX ADMIN — DORZOLAMIDE HYDROCHLORIDE 1 DROP(S): 20 SOLUTION/ DROPS OPHTHALMIC at 13:47

## 2023-01-01 RX ADMIN — ROBINUL 1 MILLIGRAM(S): 0.2 INJECTION INTRAMUSCULAR; INTRAVENOUS at 06:56

## 2023-01-01 RX ADMIN — Medication 81 MILLIGRAM(S): at 13:09

## 2023-01-01 RX ADMIN — ATORVASTATIN CALCIUM 40 MILLIGRAM(S): 80 TABLET, FILM COATED ORAL at 21:18

## 2023-01-01 RX ADMIN — FLUDROCORTISONE ACETATE 0.2 MILLIGRAM(S): 0.1 TABLET ORAL at 05:41

## 2023-01-01 RX ADMIN — ROBINUL 1 MILLIGRAM(S): 0.2 INJECTION INTRAMUSCULAR; INTRAVENOUS at 05:59

## 2023-01-01 RX ADMIN — SENNA PLUS 2 TABLET(S): 8.6 TABLET ORAL at 21:13

## 2023-01-01 RX ADMIN — Medication 20 MILLIEQUIVALENT(S): at 05:56

## 2023-01-01 RX ADMIN — FLUDROCORTISONE ACETATE 0.2 MILLIGRAM(S): 0.1 TABLET ORAL at 06:08

## 2023-01-01 RX ADMIN — PANTOPRAZOLE SODIUM 40 MILLIGRAM(S): 20 TABLET, DELAYED RELEASE ORAL at 05:56

## 2023-01-01 RX ADMIN — ENOXAPARIN SODIUM 40 MILLIGRAM(S): 100 INJECTION SUBCUTANEOUS at 05:32

## 2023-01-01 RX ADMIN — NYSTATIN CREAM 1 APPLICATION(S): 100000 CREAM TOPICAL at 06:05

## 2023-01-01 RX ADMIN — Medication 3 MILLIGRAM(S): at 21:17

## 2023-01-01 RX ADMIN — Medication 20 MILLIGRAM(S): at 21:18

## 2023-01-01 RX ADMIN — NYSTATIN CREAM 1 APPLICATION(S): 100000 CREAM TOPICAL at 05:40

## 2023-01-01 RX ADMIN — ROBINUL 1 MILLIGRAM(S): 0.2 INJECTION INTRAMUSCULAR; INTRAVENOUS at 06:04

## 2023-01-01 RX ADMIN — NYSTATIN CREAM 1 APPLICATION(S): 100000 CREAM TOPICAL at 18:04

## 2023-01-01 RX ADMIN — DROXIDOPA 100 MILLIGRAM(S): 100 CAPSULE ORAL at 05:45

## 2023-01-01 RX ADMIN — ATORVASTATIN CALCIUM 40 MILLIGRAM(S): 80 TABLET, FILM COATED ORAL at 21:42

## 2023-01-01 RX ADMIN — DROXIDOPA 100 MILLIGRAM(S): 100 CAPSULE ORAL at 13:33

## 2023-01-01 RX ADMIN — Medication 100 MILLIEQUIVALENT(S): at 12:26

## 2023-01-01 RX ADMIN — Medication 81 MILLIGRAM(S): at 12:08

## 2023-01-01 RX ADMIN — ATORVASTATIN CALCIUM 40 MILLIGRAM(S): 80 TABLET, FILM COATED ORAL at 22:21

## 2023-01-01 RX ADMIN — DROXIDOPA 200 MILLIGRAM(S): 100 CAPSULE ORAL at 12:28

## 2023-01-01 RX ADMIN — DORZOLAMIDE HYDROCHLORIDE 1 DROP(S): 20 SOLUTION/ DROPS OPHTHALMIC at 21:53

## 2023-01-01 RX ADMIN — Medication 81 MILLIGRAM(S): at 12:54

## 2023-01-01 RX ADMIN — SENNA PLUS 2 TABLET(S): 8.6 TABLET ORAL at 21:53

## 2023-01-01 RX ADMIN — ATORVASTATIN CALCIUM 40 MILLIGRAM(S): 80 TABLET, FILM COATED ORAL at 21:53

## 2023-01-01 RX ADMIN — Medication 100 MILLIGRAM(S): at 11:50

## 2023-01-01 RX ADMIN — MIDODRINE HYDROCHLORIDE 20 MILLIGRAM(S): 2.5 TABLET ORAL at 17:31

## 2023-01-01 RX ADMIN — ROBINUL 1 MILLIGRAM(S): 0.2 INJECTION INTRAMUSCULAR; INTRAVENOUS at 06:07

## 2023-01-01 RX ADMIN — Medication 81 MILLIGRAM(S): at 13:03

## 2023-01-01 RX ADMIN — ROBINUL 1 MILLIGRAM(S): 0.2 INJECTION INTRAMUSCULAR; INTRAVENOUS at 18:45

## 2023-01-01 RX ADMIN — ROBINUL 1 MILLIGRAM(S): 0.2 INJECTION INTRAMUSCULAR; INTRAVENOUS at 06:09

## 2023-01-01 RX ADMIN — DORZOLAMIDE HYDROCHLORIDE 1 DROP(S): 20 SOLUTION/ DROPS OPHTHALMIC at 14:02

## 2023-01-01 RX ADMIN — Medication 100 MILLIGRAM(S): at 12:59

## 2023-01-01 RX ADMIN — MIDODRINE HYDROCHLORIDE 20 MILLIGRAM(S): 2.5 TABLET ORAL at 05:20

## 2023-01-01 RX ADMIN — MIDODRINE HYDROCHLORIDE 20 MILLIGRAM(S): 2.5 TABLET ORAL at 12:42

## 2023-01-01 RX ADMIN — DROXIDOPA 100 MILLIGRAM(S): 100 CAPSULE ORAL at 18:07

## 2023-01-01 RX ADMIN — ROBINUL 1 MILLIGRAM(S): 0.2 INJECTION INTRAMUSCULAR; INTRAVENOUS at 18:21

## 2023-01-01 RX ADMIN — ROBINUL 1 MILLIGRAM(S): 0.2 INJECTION INTRAMUSCULAR; INTRAVENOUS at 05:23

## 2023-02-17 ENCOUNTER — APPOINTMENT (OUTPATIENT)
Dept: NEUROLOGY | Facility: CLINIC | Age: 83
End: 2023-02-17

## 2023-03-07 PROBLEM — I63.9 CEREBRAL INFARCTION, UNSPECIFIED: Chronic | Status: ACTIVE | Noted: 2022-11-01

## 2023-03-07 PROBLEM — Z86.79 PERSONAL HISTORY OF OTHER DISEASES OF THE CIRCULATORY SYSTEM: Chronic | Status: ACTIVE | Noted: 2022-11-01

## 2023-03-15 ENCOUNTER — LABORATORY RESULT (OUTPATIENT)
Age: 83
End: 2023-03-15

## 2023-03-15 ENCOUNTER — APPOINTMENT (OUTPATIENT)
Dept: HEPATOLOGY | Facility: CLINIC | Age: 83
End: 2023-03-15
Payer: MEDICARE

## 2023-03-15 ENCOUNTER — APPOINTMENT (OUTPATIENT)
Dept: HEPATOLOGY | Facility: CLINIC | Age: 83
End: 2023-03-15

## 2023-03-15 VITALS
HEIGHT: 67 IN | OXYGEN SATURATION: 96 % | BODY MASS INDEX: 23.48 KG/M2 | SYSTOLIC BLOOD PRESSURE: 138 MMHG | HEART RATE: 68 BPM | DIASTOLIC BLOOD PRESSURE: 65 MMHG | TEMPERATURE: 98.2 F | WEIGHT: 149.6 LBS

## 2023-03-15 PROCEDURE — 99204 OFFICE O/P NEW MOD 45 MIN: CPT

## 2023-03-15 NOTE — PHYSICAL EXAM
[General Appearance - Alert] : alert [General Appearance - In No Acute Distress] : in no acute distress [Sclera] : the sclera and conjunctiva were normal [] : no respiratory distress [Respiration, Rhythm And Depth] : normal respiratory rhythm and effort [Auscultation Breath Sounds / Voice Sounds] : lungs were clear to auscultation bilaterally [Heart Sounds] : normal S1 and S2 [Edema] : there was no peripheral edema [Abdomen Soft] : soft [Abdomen Tenderness] : non-tender [Oriented To Time, Place, And Person] : oriented to person, place, and time [Scleral Icterus] : No Scleral Icterus [Abdominal  Ascites] : no ascites [Asterixis] : no asterixis observed [Jaundice] : No jaundice [Palmar Erythema] : no Palmar Erythema

## 2023-03-15 NOTE — ASSESSMENT
[FreeTextEntry1] : Mr. Angel Samuel 83yo M with HTN, HLD, prior strokes, orthostatics hypotension, and Dementia here for evaluation of cirrhosis. \par \par #Cirrhosis\par Seen on abdo US in Nov 2022, for the first time he was told of this. H/o AUD X 10 yrs and has been sober for about 30 yrs. Never been obese. No diabetes.  Transaminases have been normal. BW was neg for hep B and C. BW ordered to evaluate for other CLD. Will get AFP since he has had 30 lbs unintentional weight loss. He is getting abdo CT/pelvis next week. Fibroscan test ordered to evaluate fibrosis stage.\par \par Plan:\par BW today, fibroscan, abdo CT, RTC 2 weeks. \par \par \par

## 2023-03-15 NOTE — HISTORY OF PRESENT ILLNESS
[Hospitalization] : ~he/she~ was hospitalized [de-identified] : 11/1-11/7/22 [de-identified] : Accompanied by daughter, Viji Prabhakar \par \par Mr. Angel Samuel 81yo M with HTN, HLD, prior strokes, orthostatics hypotension, and Dementia here for evaluation of cirrhosis. \par \par He was hospitalized in Nov 2022 at [facility] for dysphagia. During hospitalization had abdo US 11/2/22 showed heterogeneous liver with coarsened echotexture and nodular contour consistent with cirrhosis, no ascites. This is the first time he is aware of this. BW showed albumin 2.7, TB 1.9, ALT 21, AST 28, HBsAg neg, HCV Ab neg. \par \par He was dx with dementia Dec 2022. Reports that he has 30 lbs unintentional weight loss in 1 month and currently being evaluated. Used to weight 178 lbs and now 149 lbs.  C/o intermittent right lower abdo pain for more than 6 months. He is scheduled for abdo/pelvis CT next week. Had LLE and that resolved with diuretic.\par \par No family h/o liver disease.\par Former smoker, quit about 30 yrs ago. H/o AUD X 10 yrs and has been sober for about 30 yrs. \par Meds: reviewed and listed.

## 2023-03-15 NOTE — CONSULT LETTER
[Dear  ___] : Dear  [unfilled], [Courtesy Letter:] : I had the pleasure of seeing your patient, [unfilled], in my office today. [Please see my note below.] : Please see my note below. [Sincerely,] : Sincerely, [FreeTextEntry3] : Jodi Ervin, ANP-BC\par [facility] \par [facility]\par Tel: [phone]\par

## 2023-03-15 NOTE — REVIEW OF SYSTEMS
[Abdominal Pain] : abdominal pain [As Noted in HPI] : as noted in HPI [Negative] : ENT [Fever] : no fever [Chills] : no chills [Chest Pain] : no chest pain [Lower Ext Edema] : no extremity edema [Cough] : no cough [Melena] : no melena

## 2023-03-16 LAB
A1AT SERPL-MCNC: 140 MG/DL
AFP-TM SERPL-MCNC: <1.8 NG/ML
ALBUMIN SERPL ELPH-MCNC: 3 G/DL
ALP BLD-CCNC: 82 U/L
ALT SERPL-CCNC: 23 U/L
ANION GAP SERPL CALC-SCNC: 8 MMOL/L
AST SERPL-CCNC: 31 U/L
BASOPHILS # BLD AUTO: 0.05 K/UL
BASOPHILS NFR BLD AUTO: 0.8 %
BILIRUB DIRECT SERPL-MCNC: 0.4 MG/DL
BILIRUB SERPL-MCNC: 1 MG/DL
BUN SERPL-MCNC: 15 MG/DL
CALCIUM SERPL-MCNC: 9.1 MG/DL
CERULOPLASMIN SERPL-MCNC: 17 MG/DL
CHLORIDE SERPL-SCNC: 106 MMOL/L
CO2 SERPL-SCNC: 28 MMOL/L
CREAT SERPL-MCNC: 1.1 MG/DL
EGFR: 66 ML/MIN/1.73M2
EOSINOPHIL # BLD AUTO: 0.16 K/UL
EOSINOPHIL NFR BLD AUTO: 2.5 %
FERRITIN SERPL-MCNC: 227 NG/ML
GGT SERPL-CCNC: 45 U/L
HBV CORE IGG+IGM SER QL: NONREACTIVE
HBV SURFACE AB SER QL: NONREACTIVE
HCT VFR BLD CALC: 40.1 %
HEPATITIS A IGG ANTIBODY: REACTIVE
HGB BLD-MCNC: 12.7 G/DL
IGA SER QL IEP: 1163 MG/DL
IGG SER QL IEP: 1927 MG/DL
IGM SER QL IEP: 86 MG/DL
IMM GRANULOCYTES NFR BLD AUTO: 0.2 %
INR PPP: 1.3 RATIO
IRON SATN MFR SERPL: 55 %
IRON SERPL-MCNC: 80 UG/DL
LYMPHOCYTES # BLD AUTO: 1.34 K/UL
LYMPHOCYTES NFR BLD AUTO: 20.7 %
MAN DIFF?: NORMAL
MCHC RBC-ENTMCNC: 31.7 GM/DL
MCHC RBC-ENTMCNC: 31.8 PG
MCV RBC AUTO: 100.5 FL
MITOCHONDRIA AB SER IF-ACNC: NORMAL
MONOCYTES # BLD AUTO: 0.42 K/UL
MONOCYTES NFR BLD AUTO: 6.5 %
NEUTROPHILS # BLD AUTO: 4.5 K/UL
NEUTROPHILS NFR BLD AUTO: 69.3 %
PLATELET # BLD AUTO: 185 K/UL
POTASSIUM SERPL-SCNC: 4.5 MMOL/L
PROT SERPL-MCNC: 7.1 G/DL
PT BLD: 15.2 SEC
RBC # BLD: 3.99 M/UL
RBC # FLD: 15.8 %
SMOOTH MUSCLE AB SER QL IF: NORMAL
SODIUM SERPL-SCNC: 142 MMOL/L
TIBC SERPL-MCNC: 146 UG/DL
UIBC SERPL-MCNC: 66 UG/DL
WBC # FLD AUTO: 6.48 K/UL

## 2023-03-17 LAB
ANA SER IF-ACNC: NEGATIVE
LKM AB SER QL IF: <20.1 UNITS

## 2023-03-22 LAB — SOLUBLE LIVER IGG SER IA-ACNC: 0.5

## 2023-04-11 ENCOUNTER — APPOINTMENT (OUTPATIENT)
Dept: HEPATOLOGY | Facility: CLINIC | Age: 83
End: 2023-04-11
Payer: MEDICARE

## 2023-04-11 PROCEDURE — 76981 USE PARENCHYMA: CPT

## 2023-04-18 ENCOUNTER — APPOINTMENT (OUTPATIENT)
Dept: HEPATOLOGY | Facility: CLINIC | Age: 83
End: 2023-04-18
Payer: MEDICARE

## 2023-04-18 VITALS
HEIGHT: 67 IN | OXYGEN SATURATION: 96 % | BODY MASS INDEX: 22.76 KG/M2 | SYSTOLIC BLOOD PRESSURE: 120 MMHG | TEMPERATURE: 97.8 F | DIASTOLIC BLOOD PRESSURE: 64 MMHG | HEART RATE: 70 BPM | WEIGHT: 145 LBS

## 2023-04-18 PROCEDURE — 99214 OFFICE O/P EST MOD 30 MIN: CPT

## 2023-04-18 RX ORDER — FUROSEMIDE 20 MG/1
20 TABLET ORAL
Refills: 0 | Status: DISCONTINUED | COMMUNITY
End: 2023-04-18

## 2023-04-18 RX ORDER — LISINOPRIL 2.5 MG/1
2.5 TABLET ORAL
Refills: 0 | Status: DISCONTINUED | COMMUNITY
End: 2023-04-18

## 2023-04-18 RX ORDER — POTASSIUM CHLORIDE 750 MG/1
10 TABLET, FILM COATED, EXTENDED RELEASE ORAL
Qty: 90 | Refills: 0 | Status: DISCONTINUED | COMMUNITY
Start: 2023-04-11

## 2023-04-18 NOTE — CONSULT LETTER
[Dear  ___] : Dear  [unfilled], [Courtesy Letter:] : I had the pleasure of seeing your patient, [unfilled], in my office today. [Please see my note below.] : Please see my note below. [Sincerely,] : Sincerely, [FreeTextEntry3] : Jodi Ervin, ANP-BC\par [facility] \par [facility]\par Tel: [phone]\par

## 2023-04-18 NOTE — HISTORY OF PRESENT ILLNESS
[Hospitalization] : ~he/she~ was hospitalized [de-identified] : 11/1-11/7/22 [de-identified] : Accompanied by daughter, Viji Prabhakar \par \par Mr. Angel Samuel 81yo M with HTN, HLD, prior strokes, orthostatics hypotension, and Dementia here to review recent evaluation for cirrhosis. Since he was last seen, his BP has been running low, systolic in the 90s. He is currently on Midodrine and no longer taking furosemide.  Fibroscan test 4/11/23 showed F2 (7.6 kPa), S3 (). \par \par 3/15/23: Initial visit: Here for evaluation of cirrhosis. He was hospitalized in Nov 2022 at [facility] for dysphagia. During hospitalization had abdo US 11/2/22 showed heterogeneous liver with coarsened echotexture and nodular contour consistent with cirrhosis, no ascites. This is the first time he is aware of this. BW showed albumin 2.7, TB 1.9, ALT 21, AST 28, HBsAg neg, HCV Ab neg. \par \par He was dx with dementia Dec 2022. Reports that he has 30 lbs unintentional weight loss in 1 month and currently being evaluated. Used to weight 178 lbs and now 149 lbs.  C/o intermittent right lower abdo pain for more than 6 months. He is scheduled for abdo/pelvis CT next week. Had LLE and that resolved with diuretic.\par \par No family h/o liver disease.\par Former smoker, quit about 30 yrs ago. H/o AUD X 10 yrs and has been sober for about 30 yrs. \par Meds: reviewed and listed.

## 2023-04-18 NOTE — REVIEW OF SYSTEMS
[Abdominal Pain] : abdominal pain [As Noted in HPI] : as noted in HPI [Negative] : ENT [Fever] : no fever [Chills] : no chills [As noted in HPI] : as noted in HPI [Chest Pain] : no chest pain [Lower Ext Edema] : no extremity edema [Cough] : no cough [Melena] : no melena

## 2023-04-18 NOTE — ASSESSMENT
[FreeTextEntry1] : Mr. Angel Samuel 81yo M with HTN, HLD, prior strokes, orthostatics hypotension, and Dementia here to review recent liver evaluation for cirrhosis. \par \par #Cirrhosis\par -Was found to have cirrhosis recently on abdo US in Nov 2022. Abdo CT 3/28/23 also showed cirrhosis with mild splenomegaly and mild ascites. There is prominent esophageal varices. However his fibroscan  test showed moderate fibrosis with significant steatosis. Explained that the fibroscan test probably incorrect and based on his other tests, he has cirrhosis.  \par Transaminases have been normal. Laboratory workup  was neg for viral hepatitis A, B and C. No immune mediated liver disease or hereditary liver disease.  immune to hep A. \par >Ascites: Mild on CT. Was taking furosemide but has stopped due to hypotension, currently on midodrine. B/C of this will hold off on diuretics. Advised to do daily weight and if increased > 5 lbs, to contact me. Pt was counseled to adhere to a low sodium (<2 grams of sodium per day) diet by: avoiding adding any salt to meals (removing the salt shaker from the table); eliminating salty foods from his diet; eating more home-cooked meals; choosing fresh or frozen, not canned, vegetables and fruits; and reading ingredient and food labels to choose low sodium foods.\par -EV: unknown however abdo CT showed EV.  PLT >150. Discussed EGD for EV screening but daughter would like to defer for now since he has been getting many tests and currently dealing with hypotension. \par -HE: none \par -HCC: neg on on CT 3/28/23. AFP <1.8\par -MELD=10 on BW 3/15/23 however not a candidate for OLT due to advanced age and other comorbidities. \par \par #Fatty liver\par -Fibroscan  test showed significant steatosis.H/o AUD X 10 yrs and has been sober for about 30 yrs. Never been obese. No diabetes. Current weight 145 lbs, BMI 23. \par \par Plan:\par RTC 6 weeks with repeat BW\par \par \par

## 2023-06-06 NOTE — CONSULT LETTER
[Dear  ___] : Dear  [unfilled], [Courtesy Letter:] : I had the pleasure of seeing your patient, [unfilled], in my office today. [Please see my note below.] : Please see my note below. [Sincerely,] : Sincerely, [FreeTextEntry3] : Jodi Ervin, ANP-BC\par [facility] \par [facility]\par Tel: [phone]\par

## 2023-06-06 NOTE — ASSESSMENT
[FreeTextEntry1] : Mr. Angel Samuel 82yo M with HTN, HLD, prior strokes, orthostatics hypotension, and Dementia here follow up for cirrhosis. \par \par #Cirrhosis\par -Was found to have cirrhosis on abdo US in Nov 2022. Abdo CT 3/28/23 also showed cirrhosis with mild splenomegaly and mild ascites. There is prominent esophageal varices. However his fibroscan  test showed moderate fibrosis with significant steatosis. Explained that the fibroscan test probably incorrect and based on his other tests, he likely has cirrhosis.  \par Transaminases have been normal. Laboratory workup  was neg for viral hepatitis A, B and C. No immune mediated liver disease or hereditary liver disease.  immune to hep A. \par >Ascites: Mild on CT. Was taking furosemide but has stopped due to hypotension, currently on midodrine. B/C of this will hold off on diuretics. Advised to do daily weight and if increased > 5 lbs, to contact me. Pt was counseled to adhere to a low sodium (<2 grams of sodium per day) diet by: avoiding adding any salt to meals (removing the salt shaker from the table); eliminating salty foods from his diet; eating more home-cooked meals; choosing fresh or frozen, not canned, vegetables and fruits; and reading ingredient and food labels to choose low sodium foods.\par -EV: unknown however abdo CT showed EV.  PLT was >150 however recently 128,000 on BW 6/1/23. Discussed EGD for EV screening but daughter would like to defer for now since he has been getting many tests and currently dealing with hypotension. \par -HE: none but has dementia \par -HCC: neg on on CT 3/28/23. AFP <1.8\par -MELD= not a candidate for OLT due to advanced age and other comorbidities. \par \par #Fatty liver\par -Fibroscan  test showed significant steatosis.H/o AUD X 10 yrs and has been sober for about 30 yrs. Never been obese. No diabetes. Current weight 148 lbs, BMI 23. \par \par Plan:\par RTC 2 months with repeat BW\par \par \par

## 2023-06-06 NOTE — PHYSICAL EXAM
[Scleral Icterus] : No Scleral Icterus [Abdominal  Ascites] : no ascites [Jaundice] : No jaundice [Palmar Erythema] : no Palmar Erythema [General Appearance - Alert] : alert [General Appearance - In No Acute Distress] : in no acute distress [Sclera] : the sclera and conjunctiva were normal [] : no respiratory distress [Respiration, Rhythm And Depth] : normal respiratory rhythm and effort [Edema] : there was no peripheral edema [Abdomen Soft] : soft [Abdomen Tenderness] : non-tender

## 2023-06-06 NOTE — REVIEW OF SYSTEMS
[Fever] : no fever [Chills] : no chills [As noted in HPI] : as noted in HPI [Chest Pain] : no chest pain [Lower Ext Edema] : no extremity edema [Cough] : no cough [Melena] : no melena [As Noted in HPI] : as noted in HPI [Negative] : ENT [FreeTextEntry3] : blurred vision

## 2023-07-12 NOTE — HISTORY OF PRESENT ILLNESS
[FreeTextEntry1] :  is a 83 year old right handed male, presents to the movement disorder clinic for ongoing concerns of dysphagia, blurry vision and orthostatic hypotension\par \par Sequence of invents\par Patient is accompanied by his daughter who can give collateral information\par On 11/7/2022 he was admitted to [facility] for dizziness, blurry vision and dysphagia, detailed workup and EEG was done and he was discharged with concerns of orthostatic hypotension [ laying , standing 125] on midodrine 5mg TID\par \par Ongoing symptoms:\par Dysphagia:\par patient states he has difficulty swallowing for the last two years, to both solid and liquid food\par He is currently seeing a speech pathologist and is recommended to be on pureed diet\par \par Falls \par Denies any concerns of falls or balance problems.however he states he is extra cautious to get up from a sitting position because of orthostatic hypotension\par \par Blurry vision\par He complains of episodes of blurry vision, that primary happen when he is trying to get up from sitting position and trying to walk. he denies any loss of vision. The vision affects both eyes. He denies any blurry vision during this clinic visit\par \par Inappropriate laughter\par Occasionally has noted that he has episodes of inappropriate laughter in front of friends\par \par He denies any concerns of tremor, stiffness, falls, hallucinations, memory complaints, mood problems or behavior problems,RBD \par \par Social hx:\par Worked as a  till the age of 79years, Non drinker, Non smoker\par \par FH \par Denies any fFH of PD , or memory problems\par \par OSH workup\par His primary care provider and neurologist had obtained a PET scan of brain and MRI brain\par MRI brain was reviewed and did not show any structural abnormality. PET scan showed hypo metabolism in parieto occipital area

## 2023-07-12 NOTE — DISCUSSION/SUMMARY
[FreeTextEntry1] : In summary, the patient 83-year-old right-handed man for second opinion on a previous diagnosis of Lewy body disease.  The examination was significant for hypophonia, and mild left-sided parkinsonism.  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  It is possible that he has unrelated orthostasis and Parkinson's disease, but the onset seems somewhat parallel.  The MRI did not show any significant features of MSA, but the symptoms have only been persistent for about a year.  \par \par He did have an FDG PET scan which showed reduced uptake in the bilateral parietal occipital lobes, this was felt to be nonspecific or possibly consistent with Lewy body disease.  However, he has no cognitive deficits and never had hallucinations, so that would not fit with the current diagnosis.\par \par His parkinsonism is mild and probably does not require any treatment at this time, especially since levodopa might worsen his prostatic hypertension.  As a first step we did recommend to add Florinef 3 times a week to the midodrine to see if we can stabilize his blood pressure.  Given his vision complaints, we also recommended carotid Dopplers and transcranial Dopplers to assess the vessels and referred him to neuro-ophthalmology.\par \par For the dysphagia I referred him to the speech and swallow center to be evaluated further.\par \par We spent approximately 70 minutes with the patient and his daughter, examining and discussing the above findings impression.  Follow-up will be in 6 months, sooner if new problems arise.

## 2023-07-12 NOTE — PHYSICAL EXAM
[Outer Ear] : the ears and nose were normal in appearance [Oropharynx] : the oropharynx was normal [] : no respiratory distress [Heart Rate And Rhythm] : heart rate was normal and rhythm regular [Heart Sounds] : normal S1 and S2 [Heart Sounds Gallop] : no gallops [Murmurs] : no murmurs [Heart Sounds Pericardial Friction Rub] : no pericardial rub [Full Pulse] : the pedal pulses are present [Edema] : there was no peripheral edema [No CVA Tenderness] : no ~M costovertebral angle tenderness [No Spinal Tenderness] : no spinal tenderness [Person] : oriented to person [Place] : oriented to place [Time] : oriented to time [Cranial Nerves Optic (II)] : visual acuity intact bilaterally,  visual fields full to confrontation, pupils equal round and reactive to light [Cranial Nerves Oculomotor (III)] : extraocular motion intact [Cranial Nerves Trigeminal (V)] : facial sensation intact symmetrically [Cranial Nerves Facial (VII)] : face symmetrical [Cranial Nerves Vestibulocochlear (VIII)] : hearing was intact bilaterally [Cranial Nerves Glossopharyngeal (IX)] : tongue and palate midline [Cranial Nerves Accessory (XI - Cranial And Spinal)] : head turning and shoulder shrug symmetric [Motor Strength] : muscle strength was normal in all four extremities [Motor Handedness Right-Handed] : the patient is right hand dominant [FreeTextEntry1] : course breath sounds

## 2023-08-08 NOTE — ASSESSMENT
[FreeTextEntry1] : Mr. Angel Samuel 82yo M with HTN, HLD, prior strokes, orthostatics hypotension, and Dementia here follow up for cirrhosis.   #Cirrhosis -Was found to have cirrhosis on abdo US in Nov 2022. Abdo CT 3/28/23 also showed cirrhosis with mild splenomegaly and mild ascites. There is prominent esophageal varices. However his fibroscan  test showed moderate fibrosis with significant steatosis. Explained that the fibroscan test probably incorrect and based on his other tests, he likely has cirrhosis.   Transaminases have been normal. Laboratory workup  was neg for viral hepatitis A, B and C. No immune mediated liver disease or hereditary liver disease.  immune to hep A.  >Ascites: Mild on CT. Was taking furosemide but has stopped due to hypotension since April, currently on midodrine Fludrocortisone. B/C of this will hold off on restarting diuretics.  Advised to stop potassium supplement, BW 8/3/23 potassium 5.1. Advised to do daily weight and if increased > 5 lbs, to contact me. Pt was counseled to adhere to a low sodium (<2 grams of sodium per day) diet by: avoiding adding any salt to meals (removing the salt shaker from the table); eliminating salty foods from his diet; eating more home-cooked meals; choosing fresh or frozen, not canned, vegetables and fruits; and reading ingredient and food labels to choose low sodium foods. -EV: unknown however abdo CT showed EV.  PLT was 140 on BW 8/3. Will defer EGD for EV screening for now since he currently dealing with hypotension.  -HE: none but has dementia  -HCC: neg on on CT 3/28/23. AFP <1.8.  -MELD= 9 on BW 8/3, too low for evaluation but also not a candidate for OLT due to advanced age and other comorbidities.   #Fatty liver -Fibroscan  test showed significant steatosis.H/o AUD X 10 yrs and has been sober for about 30 yrs. Never been obese. No diabetes.   Plan: RTC 2 months with repeat BW and abdo US

## 2023-08-08 NOTE — PHYSICAL EXAM
[Scleral Icterus] : No Scleral Icterus [Abdominal  Ascites] : no ascites [Jaundice] : No jaundice [Palmar Erythema] : no Palmar Erythema [General Appearance - Alert] : alert [General Appearance - In No Acute Distress] : in no acute distress [Sclera] : the sclera and conjunctiva were normal [] : no respiratory distress [Respiration, Rhythm And Depth] : normal respiratory rhythm and effort [FreeTextEntry1] : +1 on left ankle  [Abdomen Soft] : soft [Abdomen Tenderness] : non-tender

## 2023-08-08 NOTE — REVIEW OF SYSTEMS
[Fever] : no fever [Chills] : no chills [As noted in HPI] : as noted in HPI [Chest Pain] : no chest pain [Lower Ext Edema] : no extremity edema [Cough] : no cough [Abdominal Pain] : no abdominal pain [Melena] : no melena [As Noted in HPI] : as noted in HPI [Negative] : ENT [FreeTextEntry3] : blurred vision

## 2023-08-08 NOTE — HISTORY OF PRESENT ILLNESS
[de-identified] : Accompanied by daughter, Viji Samuel 82yo M with HTN, HLD, prior strokes, orthostatics hypotension, and Dementia here for follow up for cirrhosis.   -8/8/23 Recently seen by his PCP for chronic orthostatic hypotension and was started on Fludrocortisone about 3 weeks ago. Still having low BP in the AM and is also taking midodrine. Has been furosemide since April as per his PCP but didn't realized potassium should also stopped. Weight has been stable. Following low sodium diet.   -6/6/23 Main complaint is blurred vision and dysphagia. Has been evaluate by ophthalmologist w/o clear cause. No fluid overload. No edema. Denies abdominal pain, nausea, vomiting, melena, hematochezia, or hematemesis. Still taking midodrine for hypotension.   -4/18/23: here to review recent evaluation for cirrhosis. Since he was last seen, his BP has been running low, systolic in the 90s. He is currently on Midodrine and no longer taking furosemide.  Fibroscan test 4/11/23 showed F2 (7.6 kPa), S3 ().   - 3/15/23: Initial visit: Here for evaluation of cirrhosis. He was hospitalized in Nov 2022 at [facility] for dysphagia. During hospitalization had abdo US 11/2/22 showed heterogeneous liver with coarsened echotexture and nodular contour consistent with cirrhosis, no ascites. This is the first time he is aware of this. BW showed albumin 2.7, TB 1.9, ALT 21, AST 28, HBsAg neg, HCV Ab neg.   He was dx with dementia Dec 2022. Reports that he has 30 lbs unintentional weight loss in 1 month and currently being evaluated. Used to weight 178 lbs and now 149 lbs.  C/o intermittent right lower abdo pain for more than 6 months. He is scheduled for abdo/pelvis CT next week. Had LLE and that resolved with diuretic.  No family h/o liver disease. Former smoker, quit about 30 yrs ago. H/o AUD X 10 yrs and has been sober for about 30 yrs.  Meds: reviewed and listed.

## 2023-08-08 NOTE — CONSULT LETTER
[Dear  ___] : Dear  [unfilled], [Courtesy Letter:] : I had the pleasure of seeing your patient, [unfilled], in my office today. [Please see my note below.] : Please see my note below. [Sincerely,] : Sincerely, [FreeTextEntry3] : Jodi Ervin, ANP-BC\par  [facility] \par  [facility]\par  Tel: [phone]\par

## 2023-08-11 PROBLEM — R13.12 OROPHARYNGEAL DYSPHAGIA: Status: ACTIVE | Noted: 2023-01-01

## 2023-08-11 NOTE — ASSESSMENT
[FreeTextEntry1] : Assessment/Plan: #1 Idiopathic chronic cough #2 Dysphagia #3 CVA #4 Multiple system atrophy #5 Dysphonia  After a thorough discussion regarding our findings today the patient understands that they have the presumptive diagnosis of idiopathic chronic cough.  They have agreed to start my Chronic Cough Protocol. They are to take 30 mg of prednisone daily for a total of two weeks. The day of their last dose they are to start using an inhaled steroid two puffs twice daily until I discontinue it. Over half of patients with chronic cough will have a complete cessation or near complete cessation of their cough doing this protocol. If they have resolution or near resolution of cough, I will continue them on the inhaled steroid for three more months. At that time they may attempt to slowly taper their dose. Most patients are able to get completely off their inhaled steroid, but if the cough starts to return they are to stay on the maximum dose of 2 puffs twice a day. If they are using QVAR they do not need a spacer, but if using a different inhaled steroid I strongly suggest using a spacer. Regardless, they should rinse their mouth out after each use. I have discussed the risks, benefits, and alternatives to care in regards to the oral steroids. If their insurance does not cover the steroid inhaler I prescribed the following are all acceptable substitutes:  Qvar redihaler (Beclomethasone) 80 mcg 2 puffs BID Flovent HFA (Fluticasone propionate) 220 mcg 2 puffs BID Alvesco (Ciclesonide) 160 mcg 2 puffs BID Pulmicort (Budesomide) 180 mcg 2 puffs BID Asthmanex HFA (Mometasone) 200 mcg 2 puffs BID  If they have not already gotten a recent chest xray we will order that for them and I will call with anything concerning.  Should they fail the Chronic Cough Protocol and still have a cough after 2 weeks of prednisone they are to call or message my office to update me.  At that point we would order them for a CT chest w/o contrast.  The patient expressed understanding with this plan and received a handout explaining it in detail.  The risks, benefits, and alternatives were discussed.  Patient will reach out with any questions.  After a thorough discussion of our findings today, the patient understands we need to do further workup to determine the potential cause of their dysphagia.  As such I have ordered them for a Modified Barium Swallow (MBS) to be performed by our SLP team.  I would like to follow up with them in clinic following this swallow study to go over the results and next steps.  The patient expressed understanding and agreement with this plan.

## 2023-08-11 NOTE — HISTORY OF PRESENT ILLNESS
[de-identified] : JE AKERS is a 83 year old man who presents to the [facility] with difficulty swallowing and chronic cough since Nov 2022. They do note altering their diet to avoid troublesome consistencies - has to eat soft diet and thickened liquids. Has history of CVA and new diagnosis of multiple system atrophy. Last MBS was Nov 2022 while inpatient at [place]. They do not note regurgitation of recently eaten foods.  They do note recent weight loss - 20lbs in the last 3 months They do not note frequent classic reflux symptoms. Had MBS in 2021 and Nov 2022  Has excessive phlegm that causes him to cough - clear in color  He does not cough when laying down at night The cough does not wake him up at night

## 2023-08-11 NOTE — PROCEDURE
[de-identified] : Procedure: Transnasal flexible laryngoscopy  Description: Informed consent was obtained from the patient prior to the procedure. The patient was seated in the clinic chair. Topical anesthesia was achieved by first spraying the nasal cavities with 4% lidocaine and 1% phenylephrine.   Exam: This demonstrates a clear vallecula and crisp epiglottis. The aryepiglottic folds are intact and symmetric bilaterally. The hypopharynx does demonstrate pooling. The interarytenoid space demonstrates no lesions. It does not demonstrate pachydermia. The false vocal folds are symmetric and without lesions or masses. False fold voicing (plica ventricularis) is not noted today. The true vocal folds show normal and symmetric motion bilaterally. There is no paradoxical motion. The right medial edge is crisp and shows no lesions or masses. The left medial edge is crisp and shows no lesions or masses. The mucosal covering is minimally edematous. There is not erythema present today. There are no obvious vascular ectasias present. The vocal processes do not demonstrate granulomas. The subglottis and proximal trachea is clear and unobstructed to the limits of the examination today.   Stroboscopic Laryngoscopy Procedure Note:  Indication:	Assess laryngeal biomechanics and vocal fold oscillation.  Description of Procedure:	Informed consent was verbally obtained from the patient prior to the procedure. The patient was seated in the clinic chair. Topical anesthesia was achieved by first spraying the nasal cavities with 4% lidocaine and nasal decongestant.   Findings:  Supraglottis: no masses or lesions  Glottis:    Structure:                        Right: crisp and shows no lesions or masses                        Left:  crisp and shows no lesions or masses                 Mobility:                        Right:  normal                        Left:  normal                Amplitude:                        Right:  increased                       Left:  increased                Closure: complete                 Wave symmetry:  symmetric  Subglottis: no masses or lesions within the visualized subglottis Visualized airway is widely patent.

## 2023-09-20 NOTE — SWALLOW VFSS/MBS ASSESSMENT ADULT - SPECIFY REASON(S)
MD order stated cough, oropharyngeal dysphagia, dysphagia   objective assessment of swallow function, r/o aspiration MD order stated cough, oropharyngeal dysphagia, dysphagia   objective testing of swallow function, r/o aspiration and assess strategies/tx

## 2023-09-20 NOTE — SWALLOW VFSS/MBS ASSESSMENT ADULT - ORAL PHASE
Delayed oral transit time/Reduced anterior - posterior transport/Residue in oral cavity Reduced anterior - posterior transport/Residue in oral cavity

## 2023-09-20 NOTE — SWALLOW VFSS/MBS ASSESSMENT ADULT - SLP PERTINENT HISTORY OF CURRENT PROBLEM
pt reported dysphagia to solid> liquid, "sometimes I have to bring it up" "I have to spit it back up and chew" and c/o cough drinking at night "while watching TV". as per dtr pt on puree/chopped/soft diet with thin liquid at home and improved swallow with modified solid texture. dtr reported previous use of thick liquid however pt did not like it.

## 2023-09-20 NOTE — SWALLOW VFSS/MBS ASSESSMENT ADULT - DIAGNOSTIC IMPRESSIONS
oropharyngeal swallow marked by decreased labial seal/oral containment causing intermittent trace to mild left lateral loss with thin, reduced bolus manipulation formation and transport posterior across liquid trials causing mild to moderate lingual residue- somewhat cleared independently by pt. adequate initiation of pharyngeal swallow, decreased BOT retraction causing moderate residue in valleculae and incomplete epiglottic deflection oropharyngeal swallow marked by decreased labial seal/oral containment causing intermittent trace to mild left lateral loss with thin, reduced bolus manipulation formation and transport posterior across liquid trials causing mild to moderate lingual residue- mostly cleared independently by pt. adequate initiation of pharyngeal swallow, decreased BOT retraction causing mild residue along BOT/in valleculae and incomplete epiglottic deflection. reduced laryngeal closure with aspiration during the swallow with thin liquid- pt sensate given delayed cough response however unable to clear oropharyngeal swallow marked by decreased labial seal/oral containment causing intermittent trace to mild left lateral loss with thin, reduced bolus manipulation formation and transport posterior across liquid trials causing mild to moderate lingual residue- mostly cleared independently by pt. functional mastication, bolus formation and oral transport for solid. adequate initiation of pharyngeal swallow, decreased BOT retraction causing mild residue along BOT/mild to mod residue in valleculae and reduced pharyngeal contraction causing mild to moderate residue in the PFS.  spontaneous reswallow improved pharyngeal clearance. incomplete epiglottic deflection. reduced laryngeal closure with aspiration during the swallow for thin liquid- pt sensate given delayed cough response however unable to clear. intermittent laryngeal penetration for mild thick- at times spontaneous retrieval or deep penetration. mild to moderate oropharyngeal dysphagia marked by decreased labial seal/oral containment causing intermittent trace to mild left lateral loss with thin, reduced bolus manipulation formation and transport posterior across liquid trials causing mild to moderate lingual residue- mostly cleared independently by pt. functional mastication, bolus formation and oral transport for solid. adequate initiation of pharyngeal swallow, decreased BOT retraction causing mild residue along BOT/mild to mod residue in valleculae and reduced pharyngeal contraction causing mild to moderate residue in the PFS.  spontaneous reswallow improved pharyngeal clearance. incomplete epiglottic deflection. reduced laryngeal closure with aspiration during the swallow for thin liquid- pt sensate given delayed cough response however unable to clear. intermittent laryngeal penetration for mild thick- at times spontaneous retrieval or deep penetration. no aspiration noted with solid during study.

## 2023-09-20 NOTE — SWALLOW VFSS/MBS ASSESSMENT ADULT - SLP GENERAL OBSERVATIONS
pt arrived to radiology suite accompanied by dtr, in w/c alert and on RA. cognition adequate for testing, pt able to follow directions. +dentition, dysphonia. pt reported drooling c/o "lots of it" and noted wet/gurgly vocal. pt arrived to radiology suite accompanied by dtr, in w/c, alert and on RA. cognition adequate for testing, pt able to follow directions. +dentition, dysphonia. pt reported drooling c/o "lots of it" and noted wet/gurgly vocal quality. pt arrived to radiology suite accompanied by dtr, in w/c, alert and on RA. cognition adequate for testing, pt able to follow directions. +dentition, dysphonia. pt reported drooling c/o "lots of it" and noted wet/gurgly vocal quality. noted cervical osteophyte C3 C4 C5 C6 non obstructive pt arrived to radiology suite accompanied by dtr, in w/c, alert and on RA. cognition adequate for testing, pt able to follow directions with repetition. +dentition, dysphonia. pt reported drooling c/o "lots of it" and noted wet/gurgly vocal quality. noted cervical osteophyte C3 C4 C5 C6 non obstructive

## 2023-09-20 NOTE — SWALLOW VFSS/MBS ASSESSMENT ADULT - ROSENBEK'S PENETRATION ASPIRATION SCALE
delayed cough not productive/(7) contrast passes glottis, visible subglottic residue remains despite patient’s response (aspiration) (1) no aspiration, contrast does not enter airway (5) contrast contacts vocal cords, visible residue remains (penetration)

## 2023-09-20 NOTE — SWALLOW VFSS/MBS ASSESSMENT ADULT - RECOMMENDED FEEDING/EATING TECHNIQUES
double swallow/allow for swallow between intakes/check mouth frequently for oral residue/pocketing/maintain upright posture during/after eating for 30 mins/oral hygiene/small sips/bites double swallow/allow for swallow between intakes/check mouth frequently for oral residue/pocketing/hard swallow w/ each bite or sip/maintain upright posture during/after eating for 30 mins/oral hygiene/small sips/bites

## 2023-09-22 PROBLEM — R05.3 REFRACTORY CHRONIC COUGH: Status: ACTIVE | Noted: 2023-01-01

## 2023-09-22 PROBLEM — R49.0 DYSPHONIA: Status: ACTIVE | Noted: 2023-01-01

## 2023-09-22 PROBLEM — R05.9 COUGH: Status: ACTIVE | Noted: 2023-01-01

## 2023-11-15 NOTE — H&P ADULT - ATTENDING COMMENTS
82 yo M with HTN, HLD, prior strokes, orthostatics hypotension follows with neuro Dr. Navya Roblero, cirrhosis with recent paracentesis in 10/2023, p/w 2-week history of worsening dizziness on standing with low BP and chronically worsening bilaterally blurry vision. Daughter at bedside, providing collaterals. He has had chronic orthostatic hypotension, with sBP normally around 120s but it goes down to 70s with severe dizziness. Patient visited his cardiologist 1 week ago for the hypotension, and the plan was to add droxidopa to his home midodrine 30mg TID, however the medication had not been delivered yet. He follows with ophthalmologist for the blurry vision, and was given glasses but was not very helpful. Also seen by GI for cirrhosis and had the first paracentesis 10/2023. Was on lasix 20mg + spironolactone 50mg but the spironolactone was discontinued when he experienced worsening dizziness. He has had workup for gait disturbance, dysphagia by neurology, MRI brain was obtained in 2022 which showed minor stroke, which was deemed contributing to his symptoms. He has been on aspirin and statin since then.     States he used to drink a lot but quit 40 years ago, and he had workup at clinic for his liver and everything was negative including hepatitis virus and autoimmune etiology contributing to his cirrhosis.   Denies tobacco, drug use. Lives with wife (has Alzheimer’s), has HHA 7 hours x 6 times/wk.     On exam, patient is mildly distressed, AOx3, cardiopulmonary exams unremarkable. Abdomen soft, NT/ND, extremities without edema.   Labs reviewed, CBC with slight thrombocytopenia, BMP unremarkable, LFT with mild bilirubin elevation and hypoalbuminemia, all consistent with cirrhosis. ProBNP also noted to be elevated.   CXR reviewed, possible L lung opacity.   CT head and neck reviewed, no acute pathologies.        Assessment and plan:     81yo M with HTN, HLD, prior strokes, orthostatics hypotension, cirrhosis with recent paracentesis in 10/2023, p/w 2-week history of worsening dizziness on standing with low BP and chronically worsening bilaterally blurry vision. Found to have L sided opacity on CXR.     # Pneumonia, likely 2/2 aspiration   - continue abx with CTX   - SLP evaluation     # Orthostatic hypotension   - chronic in nature, might be worsening iso acute PNA and cirrhosis   - plan at outpatient was to add droxidopa to midodrine, can start both   - confirm orthostatic VS with cautions   - PT evaluation   - neurology consult for possible further workup     # Cirrhosis, 2/2 alcohol?   - patient stopped drinking 40 years ago, however had the first paracentesis in 10/2023   - daughter reports all the workup was negative as the etiology of cirrhosis   - would hold off lasix as it can worsen orthostatic VS   - obtain US abdomen for follow up of ascites after recent paracentesis      # Hx of stroke   - found to have stroke in 2022, and it was deemed the cause of his dysphagia, gait disturbance   - continue home aspirin, statin      # DVT ppx   - Lovenox

## 2023-11-15 NOTE — PATIENT PROFILE ADULT - FALL HARM RISK - HARM RISK INTERVENTIONS
Assistance with ambulation/Assistance OOB with selected safe patient handling equipment/Communicate Risk of Fall with Harm to all staff/Monitor gait and stability/Reinforce activity limits and safety measures with patient and family/Sit up slowly, dangle for a short time, stand at bedside before walking/Tailored Fall Risk Interventions/Visual Cue: Yellow wristband and red socks/Bed in lowest position, wheels locked, appropriate side rails in place/Call bell, personal items and telephone in reach/Instruct patient to call for assistance before getting out of bed or chair/Non-slip footwear when patient is out of bed/[place] to call system/Physically safe environment - no spills, clutter or unnecessary equipment/Purposeful Proactive Rounding/Room/bathroom lighting operational, light cord in reach

## 2023-11-15 NOTE — H&P ADULT - HISTORY OF PRESENT ILLNESS
This is an 82 y/o Male  with HTN, HLD, prior strokes, orthostatics hypotension, ETOH abuse, cirrhosis, hx slurred speech and gait instability presenting to the ED with worsening orthostatic hypotension for the past 3 days. Patient has a HHA who measures his BP x3 per day, his SBP would drop from 180 to 80 mmHg he would get associated dizziness, blurry vision, pre-syncope episodes with "almost fall episodes". Patient used to ambulate with a cane and now uses a walker for balance. Patient was recently seen by his cardiologist for Dr. Margarito Obando who wanted to add Droxidopa with midodrine to sustain blood pressure, however patient never received medication pending insurance authorization. Patient's neurologist is Dr. Shaka Up who referred him to neuro-ophthalmology evaluation for blurry vision, patient had a full work up and was prescribed eye glasses however still endorses vison problems.       p/w generalized weakness, slurred speech, difficulty swallowing,  worsening over the past month but has been worsening for the last year.  started asa 2 weeks ago after MRI showed chronic appearing infarcts/ neuro called for evaluation.  MRI brain 10/5/22: FLAIR changes c/w small vessel disease.  multiple chornic tiny bilateral cerebellar infarcts. small left frontal DVA; chronic post fossa subdural hygroma 6mm . MRA H unremarkable . CTH no acute findings . 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  Per  Neurology  may also benefit from FDG PET brain imaging to look for hypometabolism in brainstem, putamen  This is an 82 y/o Male  with HTN, HLD, prior strokes, orthostatics hypotension, ETOH abuse, cirrhosis, hx slurred speech and gait instability presenting to the ED with worsening orthostatic hypotension for the past 3 days. Patient has a HHA who measures his BP x3 per day, his SBP would drop from 180 to 80 mmHg he would get associated dizziness, blurry vision, pre-syncope episodes with "almost fall episodes". Patient used to ambulate with a cane and now uses a walker for balance. Patient was recently seen by his cardiologist for Dr. Margarito Obando who wanted to add Droxidopa with midodrine to sustain blood pressure, however patient never received medication pending insurance authorization. Patient's neurologist is Dr. Shaka Up who referred him to neuro-ophthalmology evaluation for blurry vision, patient had a full work up and was prescribed eye glasses however still endorses vison problems. Patient was admitted back in November 2022 for gait instability and slurred speech, he had an MRI showed chronic appearing infarcts/ neuro called for evaluation.  MRI brain 10/5/22: FLAIR changes c/w small vessel disease.  multiple chronic tiny bilateral cerebellar infarcts. small left frontal DVA; chronic post fossa subdural hygroma 6mm . Per neurology  Strokes are chronic, no acute strokes on MRI --> B/L cerebellar can contribute to unsteady gait    weakness/dysphagia and mental status changes over the last year likely 2/2 neurodegenerative d/o such as Multi system atrophy likle Lewy Body dementia. Patient had a speech and swallow evaluation this September, recommendations were soft and bite sized with mildly thickened liquids.     In ED  vitals: BP: 130/80, HR: 69, T: 36.6   s/p Azithromycin and Rocephin   1lNS, 40meq KCL   CTH, CT- C spine no acute pathology   CXR left retrocardiac opacity

## 2023-11-15 NOTE — H&P ADULT - ASSESSMENT
This is an 82 y/o Male  with HTN, HLD, prior strokes, orthostatics hypotension, ETOH abuse, cirrhosis, hx slurred speech and gait instability presenting to the ED with worsening orthostatic hypotension for the past 3 days. Patient has a HHA who measures his BP x3 per day, his SBP would drop from 180 to 80 mmHg he would get associated dizziness, blurry vision, pre-syncope episodes with "almost fall episodes". Patient used to ambulate with a cane and now uses a walker for balance. Patient was recently seen by his cardiologist for Dr. Margarito Obando who wanted to add Droxidopa with midodrine to sustain blood pressure. Admitted for aspiration pneumonia and orthostatic hypotension     In ED  vitals: BP: 130/80, HR: 69, T: 36.6   s/p Azithromycin and Rocephin   1lNS, 40meq KCL   CTH, CT- C spine no acute pathology   CXR left retrocardiac opacity

## 2023-11-15 NOTE — H&P ADULT - PROBLEM SELECTOR PLAN 2
pt has chronic dysphagia with slurred speech   seen by speech and swallow specialist this september with recommendation of soft and bite sized with mod thick liquids  CXR Left retrocardiac opacity, new from previous imaging   likely aspiration due to joseph risk   s/p Rocephin and azithromycin in ED  c/w Rocephin   aspiration precations

## 2023-11-15 NOTE — H&P ADULT - PROBLEM SELECTOR PLAN 3
hx cirrhosis   last para was in october  f/u abdomen US hx cirrhosis   last para was in october  f/u abdomen US  on furosemide 20mg, hol din setting of dizziness and orthostatic (+)

## 2023-11-15 NOTE — H&P ADULT - NSHPPHYSICALEXAM_GEN_ALL_CORE
GENERAL: NAD,  HEAD:  Atraumatic, Normocephalic  EYES:  conjunctiva and sclera clear  NECK: Supple, No JVD, Normal thyroid  CHEST/LUNG: b/l rales   HEART: Regular rate and rhythm; No murmurs, rubs, or gallops  ABDOMEN: Soft, Nontender, mild distention with (+) fluid thrill; Bowel sounds present  NERVOUS SYSTEM:  Alert & Oriented X2,    EXTREMITIES:  2+ Peripheral Pulses, No clubbing, cyanosis, or edema  SKIN: warm dry GENERAL: NAD,  HEAD:  Atraumatic, Normocephalic  EYES:  conjunctiva and sclera clear  NECK: Supple, No JVD, Normal thyroid  CHEST/LUNG: b/l rales   HEART: Regular rate and rhythm; No murmurs, rubs, or gallops  ABDOMEN: Soft, Nontender, mild distention with (+) fluid thrill; Bowel sounds present  NERVOUS SYSTEM:  Alert & Oriented X2,    EXTREMITIES:  2+ Peripheral Pulses, No clubbing, cyanosis, or +2 b/l LE edema  SKIN: warm dry

## 2023-11-15 NOTE — H&P ADULT - PROBLEM SELECTOR PLAN 1
p/w worsening orthostatic hypotension for 3 days  resume home midodrine 30mg TID and   No Droxidopa in hospital available    f/u orthostatic BP p/w worsening orthostatic hypotension for 3 days  resume home midodrine 30mg TID and   No Droxidopa in hospital available    f/u orthostatic BP  hold furosemide

## 2023-11-15 NOTE — ED PROVIDER NOTE - WR ORDER DATE AND TIME 4
15-Nov-2023 13:26
You can access the FollowMyHealth Patient Portal offered by [facility] by registering at the following website: [email or web address]. By joining [employer]’s FollowMyHealth portal, you will also be able to view your health information using other applications (apps) compatible with our system.

## 2023-11-15 NOTE — ED ADULT NURSE NOTE - NSFALLUNIVINTERV_ED_ALL_ED
Bed/Stretcher in lowest position, wheels locked, appropriate side rails in place/Call bell, personal items and telephone in reach/Instruct patient to call for assistance before getting out of bed/chair/stretcher/Non-slip footwear applied when patient is off stretcher/[place] to call system/Physically safe environment - no spills, clutter or unnecessary equipment/Purposeful proactive rounding/Room/bathroom lighting operational, light cord in reach

## 2023-11-15 NOTE — ED PROVIDER NOTE - NS ED ROS FT
Constitutional: (-) fever (-) chills (+) generalized weakness  HENT: (-) congestion (-) rhinorrhea (-) sore throat  Eyes: (-) pain (-) redness  Respiratory: (-) cough (-) shortness of breath (-) wheezing (-) stridor    Cardiovascular: (-) chest pain (-) palpitations (-) leg swelling  Gastrointestinal: (-) abdominal pain (-) blood in stool (no melena/hematochezia) (-) diarrhea (-) vomiting  Genitourinary: (-) dysuria (-) hematuria  Musculoskeletal: (-) joint swelling (-) myalgias  Skin: (-) color change (-) rash  Neurological: (-) nonfocal weakness (-) numbness (-) headaches  Psychiatric/Behavioral: (-) confusion

## 2023-11-15 NOTE — H&P ADULT - CONVERSATION DETAILS
An extensive goals of care conversation was held including options, risks and benefits of many medical treatments including CPR, intubation, and tube feeding. Daughter who is HCP states that she does not want her father to suffer in case his heart were to stop or his breathing were to worsen.  She states she would not want CPR or intubation for him and would want him to pass comfortably. As per his best interests, pt has been made DNR/DNI. A MOLST has been completed to this effect.

## 2023-11-15 NOTE — ED PROVIDER NOTE - OBJECTIVE STATEMENT
83 male with hx of former EtOH abuse with cirrhosis, dysphagia, prior CVA/TIA, & chronic orthostatic hypotension.   Pt presenting to the ED reporting generalized weakness, frequent falls, & inability to ambulate.  Family reporting worsening orthostatic hypotension at home.  No blood thinner use.

## 2023-11-15 NOTE — ED PROVIDER NOTE - PHYSICAL EXAMINATION
Gen:  Awake, alert, NAD, elderly/frail, NCAT, non-toxic appearing. No skull/facial tender, no step-offs, no raccoon/haider.  Eyes:  PERRL, EOMI, no icterus, normal lids/lashes, normal conjunctivae.  ENT:  External inspection normal, pink/dry membranes.   CV:  S1S2, regular rate and rhythm, no murmur/gallops/rubs, no JVD, 2+ pulses b/l, no edema/cords/homans, warm/well-perfused.  Resp:  Normal respiratory rate/effort, no respiratory distress, normal voice, speaking full sentences, lungs clear to auscultation b/l, no wheezing/rales/rhonchi, no retractions, no stridor.  Abd:  Soft abdomen, no tender/distended/guarding/rebound, no pulsatile mass, no CVA tender.   Musculoskeletal:  N/V intact, FROM all 4 extremities, normal motor tone. Pelvis stable, no TLS spinal tender/deformity/step-offs, no román tender/deformity.  Neck:  FROM neck, supple, trachea midline, no meningismus. No C-spine tender/deformity/step-offs.   Skin:  Color normal for race, warm and dry, no rash.  Neuro:  Oriented x3, CN 2-12 intact (grossly), normal motor (grossly), normal sensory (grossly), GCS 15  Psych:  Attention normal. Affect normal. Behavior normal. Judgment normal.

## 2023-11-15 NOTE — H&P ADULT - PROBLEM SELECTOR PLAN 4
MRI 2022 multiple chronic cerebellar infarcts  plus progressive worsening dementia   f/u neurology eval

## 2023-11-15 NOTE — ED PROVIDER NOTE - NSICDXPASTMEDICALHX_GEN_ALL_CORE_FT
PAST MEDICAL HISTORY:  Cirrhosis     CVA (cerebrovascular accident)     H/O orthostatic hypotension

## 2023-11-15 NOTE — ED PROVIDER NOTE - CLINICAL SUMMARY MEDICAL DECISION MAKING FREE TEXT BOX
83 male with hx of former EtOH abuse with cirrhosis, dysphagia, prior CVA/TIA, & chronic orthostatic hypotension presenting to the ED with generalized weakness, frequent falls, & inability to ambulate.      Vitals stable.    Nontoxic appearing, n/v intact.  Airway intact, no respiratory distress, no hypoxia.  No abdominal or CVA tenderness.  Non-focal neuro. Dehydrated appearing    Plan to obtain:    -Labs r/o electrolyte abnormality, ZIA, or infection, EKG, XR's r/o PNA, CT r/o intracranial hemorrhage or mass, r/o c-spine fx, IV fluids, analgesia/antiemetics as needed, likely admit    Lab values demonstrate no acute/emergent pathology.    Independent interpretation of the EKG: Sinus @ 71, normal axis, 1st degree AV block, normal ST/T  Independent interpretation of XR: ***    ***    Pt advised regarding need for close outpatient follow up.  Patient stable for further care in outpatient setting. No indication for inpatient admission at this time. Patient advised regarding symptomatic & supportive care and symptoms to prompt ED return. Strict return precautions provided.  ***  Patient requires inpatient admission for further care & stabilization. Care signed out to inpatient team. 83 male with hx of former EtOH abuse with cirrhosis, dysphagia, prior CVA/TIA, & chronic orthostatic hypotension presenting to the ED with generalized weakness, frequent falls, & inability to ambulate.      Vitals stable.    Nontoxic appearing, n/v intact.  Airway intact, no respiratory distress, no hypoxia.  No abdominal or CVA tenderness.  Non-focal neuro. Dehydrated appearing    Plan to obtain:    -Labs r/o electrolyte abnormality, ZIA, or infection, EKG, XR's r/o PNA, CT r/o intracranial hemorrhage or mass, r/o c-spine fx, IV fluids, analgesia/antiemetics as needed, likely admit    Lab values demonstrate no acute/emergent pathology.    Independent interpretation of the EKG: Sinus @ 71, normal axis, 1st degree AV block, normal ST/T  Independent interpretation of CXR: Low***  Independent interpretation of pelvic XR: s/p L hip repair, no acute fx    ***    Pt advised regarding need for close outpatient follow up.  Patient stable for further care in outpatient setting. No indication for inpatient admission at this time. Patient advised regarding symptomatic & supportive care and symptoms to prompt ED return. Strict return precautions provided.  ***  Patient requires inpatient admission for further care & stabilization. Care signed out to inpatient team. 83 male with hx of former EtOH abuse with cirrhosis, dysphagia, prior CVA/TIA, & chronic orthostatic hypotension presenting to the ED with generalized weakness, frequent falls, & inability to ambulate.      Vitals stable.    Nontoxic appearing, n/v intact.  Airway intact, no respiratory distress, no hypoxia.  No abdominal or CVA tenderness.  Non-focal neuro. Dehydrated appearing    Plan to obtain:    -Labs r/o electrolyte abnormality, ZIA, or infection, EKG, XR's r/o PNA, CT r/o intracranial hemorrhage or mass, r/o c-spine fx, IV fluids, analgesia/antiemetics as needed, likely admit    Lab values demonstrate no acute/emergent pathology.    Independent interpretation of the EKG: Sinus @ 71, normal axis, 1st degree AV block, normal ST/T  Independent interpretation of CXR: Low lung volumes, L base consolidation, no effusion/pntx  Independent interpretation of pelvic XR: s/p L hip repair, no acute fx  Abx & IV fluids given  Patient requires inpatient admission for further care & stabilization. Care signed out to inpatient team. 83 male with hx of former EtOH abuse with cirrhosis, dysphagia, prior CVA/TIA, & chronic orthostatic hypotension presenting to the ED with generalized weakness, frequent falls, & inability to ambulate.      Vitals stable.    Nontoxic appearing, n/v intact.  Airway intact, no respiratory distress, no hypoxia.  No abdominal or CVA tenderness.  Non-focal neuro. Dehydrated appearing    Plan to obtain:    -Labs r/o electrolyte abnormality, ZIA, or infection, EKG, XR's r/o PNA, CT r/o intracranial hemorrhage or mass, r/o c-spine fx, IV fluids, analgesia/antiemetics as needed, likely admit    Lab values demonstrate no acute/emergent pathology. Elevated BNP.  Independent interpretation of the EKG: Sinus @ 71, normal axis, 1st degree AV block, normal ST/T  Independent interpretation of CXR: Low lung volumes, L base consolidation, no effusion/pntx. No evidence of CHF.  Independent interpretation of pelvic XR: s/p L hip repair, no acute fx  Abx & IV fluids given  Patient requires inpatient admission for further care & stabilization. Care signed out to inpatient team.

## 2023-11-16 NOTE — DISCHARGE NOTE PROVIDER - PROVIDER TOKENS
PROVIDER:[TOKEN:[[id number]]] PROVIDER:[TOKEN:[[id number]]] PROVIDER:[TOKEN:[[id number]]]

## 2023-11-16 NOTE — CONSULT NOTE ADULT - SUBJECTIVE AND OBJECTIVE BOX
NEUROLOGY CONSULT NOTE    NAME:  JE AKERS      ASSESSMENT:  83 RHM with neurogenic orthostatic hypotension, short-term memory deficit, and persistent bilateral blurry vision in the setting of likely Lewy body dementia without behavioral disturbance, with limited effectiveness of high-dose Midodrine      RECOMMENDATIONS:    - Continue Midodrine 30mg PO TID       - No further increase at this time since this medication dosage was increased only one week ago, and since it is already at a high dose    - Resume previous medication, Fludrocortisone 0.1mg PO Daily       - Monitor for dizziness, which was a previous adverse effect of this medication in this patient    - Droxidopa not started due to not being approved in the outpatient setting and not being on formulary in the inpatient setting    - MRI Brain w/wo Gadolinium & MRI Orbits w/wo Gadolinium approved to evaluate for intracranial or orbital structural abnormalities          NOTE TO BE COMPLETED - PLEASE REFER TO ABOVE ONLY AND IGNORE INFORMATION BELOW    *******************************      CHIEF COMPLAINT:  Patient is a 83y old  Male who presents with a chief complaint of orthostatic hypotension & gait instability (15 Nov 2023 19:28)      HPI:  This is an 84 y/o Male  with HTN, HLD, prior strokes, orthostatics hypotension, ETOH abuse, cirrhosis, hx slurred speech and gait instability presenting to the ED with worsening orthostatic hypotension for the past 3 days. Patient has a HHA who measures his BP x3 per day, his SBP would drop from 180 to 80 mmHg he would get associated dizziness, blurry vision, pre-syncope episodes with "almost fall episodes". Patient used to ambulate with a cane and now uses a walker for balance. Patient was recently seen by his cardiologist for Dr. Margarito Obando who wanted to add Droxidopa with midodrine to sustain blood pressure, however patient never received medication pending insurance authorization. Patient's neurologist is Dr. Shaka Up who referred him to neuro-ophthalmology evaluation for blurry vision, patient had a full work up and was prescribed eye glasses however still endorses vison problems. Patient was admitted back in November 2022 for gait instability and slurred speech, he had an MRI showed chronic appearing infarcts/ neuro called for evaluation.  MRI brain 10/5/22: FLAIR changes c/w small vessel disease.  multiple chronic tiny bilateral cerebellar infarcts. small left frontal DVA; chronic post fossa subdural hygroma 6mm . Per neurology  Strokes are chronic, no acute strokes on MRI --> B/L cerebellar can contribute to unsteady gait weakness/dysphagia and mental status changes over the last year likely 2/2 neurodegenerative d/o such as Multi system atrophy like Lewy Body dementia. Patient had a speech and swallow evaluation this September, recommendations were soft and bite sized with mildly thickened liquids.     In ED  vitals: BP: 130/80, HR: 69, T: 36.6   s/p Azithromycin and Rocephin   1lNS, 40meq KCL   CTH, CT- C spine no acute pathology   CXR left retrocardiac opacity  (15 Nov 2023 19:28)      NEURO HPI:      PAST MEDICAL & SURGICAL HISTORY:  H/O orthostatic hypotension  CVA (cerebrovascular accident)  Cirrhosis  History of hip surgery      MEDICATIONS:  aspirin enteric coated 81 milliGRAM(s) Oral daily  atorvastatin 40 milliGRAM(s) Oral at bedtime  cefTRIAXone   IVPB 1000 milliGRAM(s) IV Intermittent every 24 hours  dorzolamide 2% Ophthalmic Solution 1 Drop(s) Both EYES three times a day  enoxaparin Injectable 40 milliGRAM(s) SubCutaneous every 24 hours  fludroCORTISONE 0.1 milliGRAM(s) Oral daily  folic acid 1 milliGRAM(s) Oral daily  glycopyrrolate 1 milliGRAM(s) Oral two times a day  latanoprost 0.005% Ophthalmic Solution 1 Drop(s) Both EYES at bedtime  midodrine. 30 milliGRAM(s) Oral three times a day  ondansetron Injectable 4 milliGRAM(s) IV Push every 8 hours PRN  potassium chloride  10 mEq/100 mL IVPB 10 milliEquivalent(s) IV Intermittent every 1 hour  senna 2 Tablet(s) Oral at bedtime  thiamine 100 milliGRAM(s) Oral daily      ALLERGIES:  No Known Allergies      FAMILY HISTORY:  No reported family history of dementia      SOCIAL HISTORY:  Denies alcohol, tobacco, or illicit drug use      REVIEW OF SYSTEMS:  GENERAL: No fever, weight changes, fatigue  EYES: No eye pain or discharge  EAR/NOSE/MOUTH/THROAT: No sinus or throat pain; No difficulty hearing  NECK: No pain or stiffness  RESPIRATORY: No cough, wheezing, chills, or hemoptysis  CARDIOVASCULAR: No chest pain, palpitations, shortness of breath, or dyspnea on exertion  GASTROINTESTINAL: No abdominal pain, nausea, vomiting, hematemesis, diarrhea, or constipation  GENITOURINARY: No dysuria, frequency, hematuria, or incontinence  SKIN: No rashes or lesions  ENDOCRINE: No heat or cold intolerance  HEMATOLOGIC: No easy bruising or bleeding  PSYCHIATRIC: No depression, anxiety, or mood swings  MUSCULOSKELETAL: No joint pain or swelling  NEUROLOGICAL: As per HPI          OBJECTIVE:    Vital Signs Last 24 Hrs  T(C): 36.3 (16 Nov 2023 04:55), Max: 37.1 (15 Nov 2023 22:03)  T(F): 97.3 (16 Nov 2023 04:55), Max: 98.7 (15 Nov 2023 22:03)  HR: 60 (16 Nov 2023 04:55) (60 - 69)  BP: 143/74 (16 Nov 2023 04:55) (130/80 - 168/81)  BP(mean): 97 (16 Nov 2023 04:55) (97 - 97)  RR: 18 (16 Nov 2023 04:55) (18 - 18)  SpO2: 95% (16 Nov 2023 04:55) (94% - 97%)    Parameters below as of 16 Nov 2023 04:55  Patient On (Oxygen Delivery Method): room air        General Examination:  General: No acute distress  HEENT: Atraumatic, Normocephalic  Respiratory: CTA B/l.  No crackles, rhonchi, or wheezes.  Cardiovascular: RRR.  Normal S1 & S2.  Normal b/l radial and pedal pulses.    Neurological Examination:  General / Mental Status: AAO x 2 (not to date).  No aphasia or dysarthria.  Naming and repetition intact.  Immediate recall: 0/3, 5-minute delayed recall: 1/3 (Blue) (recalls 1 word, "Apple," with MCQ cue, but cannot recall third word, "Table," with either category or MCQ cues - initially incorrectly recalls "Tunnel").  Cranial Nerves: Subjective blurry vision, but with VFF x 4 with each eye individually.  PERRL.  EOMI x 2.  No nystagmus, gaze preference, or diplopia.  B/l V1-V3 equal and intact to light touch and pinprick.  Symmetric facial movement and palate elevation.  B/l hearing equal to finger rub.  5/5 strength with b/l sternocleidomastoid and trapezius.  Midline tongue protrusion, with no atrophy or fasciculations.  Motor: Normal bulk & tone in all four extremities.  5/5 strength throughout all four extremities.  No downward drift, rigidity, spasticity, or tremors in any of the four extremities.  Sensory: Intact to light touch and pinprick in all four extremities.  Negative Romberg.  Reflex: 2+ and symmetric at b/l biceps, triceps, brachioradialis, patellae, and ankles.  Downgoing toes b/l.  Coordination: No dysmetria with b/l finger-to-nose and heel raise tests.  Gait and Romberg sign testing deferred due to weakness.          LABORATORY VALUES:                        9.2    3.97  )-----------( 109      ( 16 Nov 2023 05:05 )             28.6       11-16    142  |  113<H>  |  20<H>  ----------------------------<  82  3.4<L>   |  29  |  1.00    Ca    7.9<L>      16 Nov 2023 05:05  Phos  2.3     11-16  Mg     2.0     11-16    TPro  5.4<L>  /  Alb  1.7<L>  /  TBili  1.0  /  DBili  x   /  AST  25  /  ALT  21  /  AlkPhos  64  11-16            NEUROIMAGING:          Please contact the Neurology consult service with any neurological questions.    Omar Black MD   of Neurology  [facility] at [facility] NEUROLOGY CONSULT NOTE    NAME:  JE AKERS      ASSESSMENT:  83 RHM with neurogenic orthostatic hypotension, short-term memory deficit, and persistent bilateral blurry vision in the setting of likely Lewy body dementia without behavioral disturbance, with limited effectiveness of high-dose Midodrine      RECOMMENDATIONS:    - Continue Midodrine 30mg PO TID       - No further increase at this time since this medication dosage was increased only one week ago, and since it is already at a high dose    - Resume previous medication, Fludrocortisone 0.1mg PO Daily       - Monitor for dizziness, which was a previous adverse effect of this medication in this patient    - Droxidopa not started due to not being approved in the outpatient setting and not being on formulary in the inpatient setting    - Monitor orthostatic BP & HR with each vital signs check    - Plentiful fluid hydration (2 liters, or 8 glasses, of water daily) encouraged    - Patient counseled to maintain caution with rapid changes in position    - MRI Brain w/wo Gadolinium & MRI Orbits w/wo Gadolinium approved to evaluate for intracranial or orbital structural abnormalities          *******************************      CHIEF COMPLAINT:  Patient is a 83y old  Male who presents with a chief complaint of orthostatic hypotension & gait instability (15 Nov 2023 19:28)      HPI:  This is an 82 y/o Male  with HTN, HLD, prior strokes, orthostatics hypotension, ETOH abuse, cirrhosis, hx slurred speech and gait instability presenting to the ED with worsening orthostatic hypotension for the past 3 days. Patient has a HHA who measures his BP x3 per day, his SBP would drop from 180 to 80 mmHg he would get associated dizziness, blurry vision, pre-syncope episodes with "almost fall episodes". Patient used to ambulate with a cane and now uses a walker for balance. Patient was recently seen by his cardiologist for Dr. Margarito Obando who wanted to add Droxidopa with midodrine to sustain blood pressure, however patient never received medication pending insurance authorization. Patient's neurologist is Dr. Shaka Up who referred him to neuro-ophthalmology evaluation for blurry vision, patient had a full work up and was prescribed eye glasses however still endorses vison problems. Patient was admitted back in November 2022 for gait instability and slurred speech, he had an MRI showed chronic appearing infarcts/ neuro called for evaluation.  MRI brain 10/5/22: FLAIR changes c/w small vessel disease.  Multiple chronic tiny bilateral cerebellar infarcts. Small left frontal DVA; chronic post fossa subdural hygroma 6mm . Per neurology  Strokes are chronic, no acute strokes on MRI --> B/L cerebellar can contribute to unsteady gait weakness/dysphagia and mental status changes over the last year likely 2/2 neurodegenerative d/o such as Multi system atrophy like Lewy Body dementia. Patient had a speech and swallow evaluation this September, recommendations were soft and bite sized with mildly thickened liquids.   In ED  vitals: BP: 130/80, HR: 69, T: 36.6   s/p Azithromycin and Rocephin   1lNS, 40meq KCL   CTH, CT- C spine no acute pathology   CXR left retrocardiac opacity  (15 Nov 2023 19:28)      NEURO HPI:  83M with neurodegenerative disease, concernign for Lewy body dementia without behavioral disturbance and with neurogenic orthostatic hypotension, with a two-week history of episodes of drops in blood pressure upon standing, not responsive with gradual increase in Midodrine dosage from 5mg to 30mg PO TID over the past four months. He was also on Fludrocortisone 0.1mg PO Daily earlier this year, but this was stopped when he experienced dizziness with it. He was recently ordered to start Droxidopa by his cardiologist, but this has not been authorized by his health insurance plan.      PAST MEDICAL & SURGICAL HISTORY:  H/O orthostatic hypotension  CVA (cerebrovascular accident)  Cirrhosis  History of hip surgery      MEDICATIONS:  aspirin enteric coated 81 milliGRAM(s) Oral daily  atorvastatin 40 milliGRAM(s) Oral at bedtime  cefTRIAXone   IVPB 1000 milliGRAM(s) IV Intermittent every 24 hours  dorzolamide 2% Ophthalmic Solution 1 Drop(s) Both EYES three times a day  enoxaparin Injectable 40 milliGRAM(s) SubCutaneous every 24 hours  fludroCORTISONE 0.1 milliGRAM(s) Oral daily  folic acid 1 milliGRAM(s) Oral daily  glycopyrrolate 1 milliGRAM(s) Oral two times a day  latanoprost 0.005% Ophthalmic Solution 1 Drop(s) Both EYES at bedtime  midodrine. 30 milliGRAM(s) Oral three times a day  ondansetron Injectable 4 milliGRAM(s) IV Push every 8 hours PRN  potassium chloride  10 mEq/100 mL IVPB 10 milliEquivalent(s) IV Intermittent every 1 hour  senna 2 Tablet(s) Oral at bedtime  thiamine 100 milliGRAM(s) Oral daily      ALLERGIES:  No Known Allergies      FAMILY HISTORY:  No reported family history of dementia      SOCIAL HISTORY:  Denies alcohol, tobacco, or illicit drug use      REVIEW OF SYSTEMS:  GENERAL: No fever, weight changes, fatigue  EYES: No eye pain or discharge  EAR/NOSE/MOUTH/THROAT: No sinus or throat pain; No difficulty hearing  NECK: No pain or stiffness  RESPIRATORY: No cough, wheezing, chills, or hemoptysis  CARDIOVASCULAR: Episodes of orthostatic hypotension as per HPI; No chest pain, palpitations, shortness of breath, or dyspnea on exertion  GASTROINTESTINAL: No abdominal pain, nausea, vomiting, hematemesis, diarrhea, or constipation  GENITOURINARY: No dysuria, frequency, hematuria, or incontinence  SKIN: No rashes or lesions  ENDOCRINE: No heat or cold intolerance  HEMATOLOGIC: No easy bruising or bleeding  PSYCHIATRIC: No depression, anxiety, or mood swings  MUSCULOSKELETAL: No joint pain or swelling  NEUROLOGICAL: As per HPI          OBJECTIVE:    Vital Signs Last 24 Hrs  T(C): 36.3 (16 Nov 2023 04:55), Max: 37.1 (15 Nov 2023 22:03)  T(F): 97.3 (16 Nov 2023 04:55), Max: 98.7 (15 Nov 2023 22:03)  HR: 60 (16 Nov 2023 04:55) (60 - 69)  BP: 143/74 (16 Nov 2023 04:55) (130/80 - 168/81)  BP(mean): 97 (16 Nov 2023 04:55) (97 - 97)  RR: 18 (16 Nov 2023 04:55) (18 - 18)  SpO2: 95% (16 Nov 2023 04:55) (94% - 97%)  Parameters below as of 16 Nov 2023 04:55  Patient On (Oxygen Delivery Method): room air      General Examination:  General: No acute distress  HEENT: Atraumatic, Normocephalic  Respiratory: CTA B/l.  No crackles, rhonchi, or wheezes.  Cardiovascular: RRR.  Normal S1 & S2.  Normal b/l radial and pedal pulses.    Neurological Examination:  General / Mental Status: AAO x 2 (not to date).  No aphasia or dysarthria.  Naming and repetition intact.  Immediate recall: 0/3, 5-minute delayed recall: 1/3 (Blue) (recalls 1 word, "Apple," with MCQ cue, but cannot recall third word, "Table," with either category or MCQ cues - initially incorrectly recalls "Tunnel").  Cranial Nerves: Subjective blurry vision, but with VFF x 4 with each eye individually.  PERRL.  EOMI x 2.  No nystagmus, gaze preference, or diplopia.  B/l V1-V3 equal and intact to light touch and pinprick.  Symmetric facial movement and palate elevation.  B/l hearing equal to finger rub.  5/5 strength with b/l sternocleidomastoid and trapezius.  Midline tongue protrusion, with no atrophy or fasciculations.  Motor: Normal bulk & tone in all four extremities.  At least 4/5 strength throughout all four extremities.  No downward drift, rigidity, spasticity, or tremors in any of the four extremities.  Sensory: Intact to light touch and pinprick in all four extremities.  Reflex: 1+ and symmetric at b/l biceps, triceps, brachioradialis, patellae, and ankles.  Downgoing toes b/l.  Coordination: No dysmetria with b/l finger-to-nose and heel raise tests.  Gait and Romberg sign testing deferred per patient preference.          LABORATORY VALUES:                        9.2    3.97  )-----------( 109      ( 16 Nov 2023 05:05 )             28.6       11-16    142  |  113<H>  |  20<H>  ----------------------------<  82  3.4<L>   |  29  |  1.00    Ca    7.9<L>      16 Nov 2023 05:05  Phos  2.3     11-16  Mg     2.0     11-16    TPro  5.4<L>  /  Alb  1.7<L>  /  TBili  1.0  /  DBili  x   /  AST  25  /  ALT  21  /  AlkPhos  64  11-16          NEUROIMAGING:      CT Head (11/15/23):  - No acute intracranial structural abnormality  - Chronic periventricular microvascular changes  - Mild diffuse atrophy      CT Cervical Spine (11/15/23):  - No acute fracture  - Multilevel malalignment and degenerative changes  - Straightening of lordosis          Please contact the Neurology consult service with any neurological questions.    Omar Black MD   of Neurology  [facility] at [facility]

## 2023-11-16 NOTE — PROGRESS NOTE ADULT - PROBLEM SELECTOR PLAN 3
hx cirrhosis   last para was in october  f/u abdomen US  on furosemide 20mg, hol din setting of dizziness and orthostatic (+) hx cirrhosis   last para was in october  abdomen US revealed ascites  Lasix on hold for now in setting of dizziness and orthostatic (+)

## 2023-11-16 NOTE — DISCHARGE NOTE PROVIDER - NSDCCPCAREPLAN_GEN_ALL_CORE_FT
PRINCIPAL DISCHARGE DIAGNOSIS  Diagnosis: Pneumonia  Assessment and Plan of Treatment: You were diagnosed with pneumonia and treated with antibiotic.  Continue to take your medication as prescribed even if you continue to feel better.  Report any symptoms of fever or chills, coughing up yellowish or greenish sputum.      SECONDARY DISCHARGE DIAGNOSES  Diagnosis: Chronic orthostatic hypotension  Assessment and Plan of Treatment: Conitune to take your medication as prescribed and follow up with your neurologist and cardiologist as outpatient.  Make sure to move slowly with changing position to decrease your risks for falls or injuries.    Diagnosis: Cirrhosis  Assessment and Plan of Treatment: Follow up with your hepatologist/liver doctor as outpatient.    Diagnosis: Hypokalemia  Assessment and Plan of Treatment: Your potassium was low on this admission and you received potassium supplement while in the hospital.  Try your best to maintain a well balanced diet.    Diagnosis: Dementia  Assessment and Plan of Treatment: Dementia is a condition that causes loss of memory, thought control, and judgment. Alzheimer disease is the most common cause of dementia. Other common causes are loss of blood flow or nerve damage in the brain, and long-term alcohol or drug use. Dementia cannot be cured or prevented, but treatment may slow or reduce your symptoms.  How is dementia diagnosed? Your healthcare provider will ask you or someone close to you about your symptoms. He or she will ask when your symptoms began, and if they have gotten worse with time. He or she may also ask if you have any family members with dementia.  Mental function testing checks how well you think and solve problems. You may be asked to draw a face clock. The clock may need to show a certain time of day. You may be asked what month it currently is, or the city you are in. Other tests may be used to check your attention, language skills, or ability to see how objects are spaced apart.  Memory testing will be done regularly so healthcare providers can monitor memory changes over time. Healthcare providers will test your long-term memory by asking questions about how much you remember from the past. They will also test your short-term memory by asking you to remember new facts.  Blood tests may be used to rule out any other conditions that could be causing your symptoms.   An MRI or CT scan can help healthcare providers find damage to your brain caused by dementia.   When should I or someone close to me seek immediate care?  You have signs of delirium, such as extreme confusion, and seeing or hearing things that are not there.  You become angry or violent, and cannot be calmed down.  You faint and cannot be woken.  When should I or someone close to me call my doctor?  You have a fever.  You have increased confusion, behavior, or mood changes.      Diagnosis: Generalized weakness  Assessment and Plan of Treatment: You weakness may have been multifactorial related to your pneumonia or ongoing dementia work up.  Report any worsening of your weakness, confusion, disorientation.     PRINCIPAL DISCHARGE DIAGNOSIS  Diagnosis: Pneumonia  Assessment and Plan of Treatment: You were diagnosed with pneumonia and treated with antibiotic.  You completed the antibiotic treatment while in the hospital.   Report any symptoms of fever or chills, coughing up yellowish or greenish sputum.      SECONDARY DISCHARGE DIAGNOSES  Diagnosis: Generalized weakness  Assessment and Plan of Treatment: You weakness may have been multifactorial related to your pneumonia or ongoing dementia. You seen the physical therapist and you were recommended for home physical therapy.   Report any worsening of your weakness, confusion, disorientation.    Diagnosis: Chronic orthostatic hypotension  Assessment and Plan of Treatment: Conitune to take your medication as prescribed and follow up with your neurologist and cardiologist as outpatient.  Make sure to move slowly with changing position to decrease your risks for falls or injuries.    Diagnosis: Cirrhosis  Assessment and Plan of Treatment: You have a history of liver cirrhosis.  You a paracentesis while in the hospitla and they removed extra fluid surrounding your abdomend.  You should follow up with your hepatologist/liver doctor as outpatient.    Diagnosis: Hypokalemia  Assessment and Plan of Treatment: Your potassium was low on this admission and you received potassium supplement while in the hospital. This was likely do to your acute infection and poor oral intake. Your potassium has been within normla limits. Try your best to maintain a well balanced diet.  Please follow up with primary care physcian for continued management.    Diagnosis: Dementia  Assessment and Plan of Treatment: Dementia is a condition that causes loss of memory, thought control, and judgment. Alzheimer disease is the most common cause of dementia. Other common causes are loss of blood flow or nerve damage in the brain, and long-term alcohol or drug use. Dementia cannot be cured or prevented, but treatment may slow or reduce your symptoms.  How is dementia diagnosed? Your healthcare provider will ask you or someone close to you about your symptoms. He or she will ask when your symptoms began, and if they have gotten worse with time. He or she may also ask if you have any family members with dementia.  Mental function testing checks how well you think and solve problems. You may be asked to draw a face clock. The clock may need to show a certain time of day. You may be asked what month it currently is, or the city you are in. Other tests may be used to check your attention, language skills, or ability to see how objects are spaced apart.  Memory testing will be done regularly so healthcare providers can monitor memory changes over time. Healthcare providers will test your long-term memory by asking questions about how much you remember from the past. They will also test your short-term memory by asking you to remember new facts.  Blood tests may be used to rule out any other conditions that could be causing your symptoms.   An MRI or CT scan can help healthcare providers find damage to your brain caused by dementia.   When should I or someone close to me seek immediate care?  You have signs of delirium, such as extreme confusion, and seeing or hearing things that are not there.  You become angry or violent, and cannot be calmed down.  You faint and cannot be woken.  When should I or someone close to me call my doctor?  You have a fever.  You have increased confusion, behavior, or mood changes.       PRINCIPAL DISCHARGE DIAGNOSIS  Diagnosis: Pneumonia  Assessment and Plan of Treatment: You were diagnosed with pneumonia and treated with antibiotic.  You completed the antibiotic treatment while in the hospital.   Report any symptoms of fever or chills, coughing up yellowish or greenish sputum.      SECONDARY DISCHARGE DIAGNOSES  Diagnosis: Chronic orthostatic hypotension  Assessment and Plan of Treatment: You were found to have episodes of low blood pressure (BP) on sitting and standing during the admission. Neurology doctor saw you, and recommended continuing medications. We adjusted the medication dose, and now you are taking midodrine 20mg three times a day and droxidopa 100mg twice a day. Please check BP before taking the medications, and follow up with your neurologist and cardiologist as outpatient.  Also please make sure to move slowly with changing position to decrease your risks for falls or injuries.    Diagnosis: Generalized weakness  Assessment and Plan of Treatment: You weakness may have been multifactorial related to your pneumonia or ongoing dementia. You seen the physical therapist and you were recommended for home physical therapy.   Report any worsening of your weakness, confusion, disorientation.    Diagnosis: Cirrhosis  Assessment and Plan of Treatment: You have a history of liver cirrhosis. You a paracentesis while in the hospitla and they removed extra fluid surrounding your abdomend. You were resumed on water medication Lasix during the admission.  You should follow up with your hepatologist/liver doctor as outpatient.    Diagnosis: Hypokalemia  Assessment and Plan of Treatment: Your potassium was low on this admission and you received potassium supplement while in the hospital. This was likely do to your acute infection and poor oral intake. Your potassium has been within normla limits. Try your best to maintain a well balanced diet.  Please follow up with primary care physcian for continued management.    Diagnosis: Dementia  Assessment and Plan of Treatment: Dementia is a condition that causes loss of memory, thought control, and judgment. Alzheimer disease is the most common cause of dementia. Other common causes are loss of blood flow or nerve damage in the brain, and long-term alcohol or drug use. Dementia cannot be cured or prevented, but treatment may slow or reduce your symptoms.  How is dementia diagnosed? Your healthcare provider will ask you or someone close to you about your symptoms. He or she will ask when your symptoms began, and if they have gotten worse with time. He or she may also ask if you have any family members with dementia.  Mental function testing checks how well you think and solve problems. You may be asked to draw a face clock. The clock may need to show a certain time of day. You may be asked what month it currently is, or the city you are in. Other tests may be used to check your attention, language skills, or ability to see how objects are spaced apart.  Memory testing will be done regularly so healthcare providers can monitor memory changes over time. Healthcare providers will test your long-term memory by asking questions about how much you remember from the past. They will also test your short-term memory by asking you to remember new facts.  Blood tests may be used to rule out any other conditions that could be causing your symptoms.   An MRI or CT scan can help healthcare providers find damage to your brain caused by dementia.   When should I or someone close to me seek immediate care?  You have signs of delirium, such as extreme confusion, and seeing or hearing things that are not there.  You become angry or violent, and cannot be calmed down.  You faint and cannot be woken.  When should I or someone close to me call my doctor?  You have a fever.  You have increased confusion, behavior, or mood changes.

## 2023-11-16 NOTE — DISCHARGE NOTE PROVIDER - ATTENDING DISCHARGE PHYSICAL EXAMINATION:
PHYSICAL EXAM:  GENERAL: NAD  HEAD:  Atraumatic, Normocephalic  EYES: conjunctiva and sclera clear  NECK: Supple, No JVD  CHEST/LUNG: Clear to auscultation bilaterally; No wheeze  HEART: Regular rate and rhythm; No murmurs, rubs, or gallops  ABDOMEN: Soft, Nontender, Nondistended; Bowel sounds present  EXTREMITIES:  No clubbing, cyanosis, or edema  PSYCH: AAOx3  NEUROLOGY: non-focal  SKIN: No rashes or lesions

## 2023-11-16 NOTE — DISCHARGE NOTE PROVIDER - CARE PROVIDER_API CALL
Mann Ponce [facility]  Internal Medicine  [address], [address] - [place] [address]  Phone: [phone]  Fax: [phone]  Follow Up Time:    Mann Ponce [facility]  Internal Medicine  [address] [place] [address]  Phone: [phone]  Fax: [phone]  Follow Up Time:    Mann Ponce [facility]  Internal Medicine  [address], [address] [place] [address]  Phone: [phone]  Fax: [phone]  Follow Up Time:

## 2023-11-16 NOTE — CONSULT NOTE ADULT - TIME BILLING
I counseled the patient, daughter at bedside, and primary team about the testing and medication adjustments indicated for evaluation and management of the patient's orthostatic hypotension and cognitive impairment.

## 2023-11-16 NOTE — DISCHARGE NOTE PROVIDER - NSDCFUSCHEDAPPT_GEN_ALL_CORE_FT
Shaka Wilkins  [facility]  NEUROLOGY [address]  Scheduled Appointment: 02/21/2024     Shaka Wilkins  [facility]  NEUROLOGY [address]  Scheduled Appointment: 02/21/2024     Shaka Wilkins  [facility]  NEUROLOGY [address]  Scheduled Appointment: 02/21/2024

## 2023-11-16 NOTE — PROGRESS NOTE ADULT - PROBLEM SELECTOR PLAN 1
p/w worsening orthostatic hypotension for 3 days  resume home midodrine 30mg TID and   No Droxidopa in hospital available    f/u orthostatic BP  hold furosemide p/w worsening orthostatic hypotension for 3 days  c/w home midodrine 30mg TID and   No Droxidopa in hospital available    f/u orthostatic BP  hold furosemide  Fludrocortizone added per neuro

## 2023-11-16 NOTE — PROGRESS NOTE ADULT - SUBJECTIVE AND OBJECTIVE BOX
NP Note discussed with  Primary Attending    Patient is a 83y old  Male who presents with a chief complaint of orthostatic hypotension & gait instability (16 Nov 2023 14:40)      INTERVAL HPI/OVERNIGHT EVENTS: no new complaints    MEDICATIONS  (STANDING):  aspirin enteric coated 81 milliGRAM(s) Oral daily  atorvastatin 40 milliGRAM(s) Oral at bedtime  cefTRIAXone   IVPB 1000 milliGRAM(s) IV Intermittent every 24 hours  dorzolamide 2% Ophthalmic Solution 1 Drop(s) Both EYES three times a day  enoxaparin Injectable 40 milliGRAM(s) SubCutaneous every 24 hours  fludroCORTISONE 0.1 milliGRAM(s) Oral daily  folic acid 1 milliGRAM(s) Oral daily  glycopyrrolate 1 milliGRAM(s) Oral two times a day  latanoprost 0.005% Ophthalmic Solution 1 Drop(s) Both EYES at bedtime  midodrine. 30 milliGRAM(s) Oral three times a day  potassium chloride  10 mEq/100 mL IVPB 10 milliEquivalent(s) IV Intermittent every 1 hour  senna 2 Tablet(s) Oral at bedtime  thiamine 100 milliGRAM(s) Oral daily    MEDICATIONS  (PRN):  ondansetron Injectable 4 milliGRAM(s) IV Push every 8 hours PRN Nausea and/or Vomiting      __________________________________________________  REVIEW OF SYSTEMS:    CONSTITUTIONAL: No fever,   EYES: no acute visual disturbances  NECK: No pain or stiffness  RESPIRATORY: No cough; No shortness of breath  CARDIOVASCULAR: No chest pain, no palpitations  GASTROINTESTINAL: No pain. No nausea or vomiting; No diarrhea   NEUROLOGICAL: No headache or numbness, no tremors  MUSCULOSKELETAL: No joint pain, no muscle pain  GENITOURINARY: no dysuria, no frequency, no hesitancy  PSYCHIATRY: no depression , no anxiety  ALL OTHER  ROS negative        Vital Signs Last 24 Hrs  T(C): 36.6 (16 Nov 2023 14:44), Max: 37.1 (15 Nov 2023 22:03)  T(F): 97.9 (16 Nov 2023 14:44), Max: 98.7 (15 Nov 2023 22:03)  HR: 61 (16 Nov 2023 14:44) (60 - 69)  BP: 175/79 (16 Nov 2023 14:44) (130/80 - 175/79)  BP(mean): 97 (16 Nov 2023 04:55) (97 - 97)  RR: 18 (16 Nov 2023 14:44) (18 - 18)  SpO2: 96% (16 Nov 2023 14:44) (94% - 97%)    Parameters below as of 16 Nov 2023 14:44  Patient On (Oxygen Delivery Method): room air        ________________________________________________  PHYSICAL EXAM:  GENERAL: NAD  HEENT: Normocephalic;  conjunctivae and sclerae clear; moist mucous membranes;   NECK : supple  CHEST/LUNG: Clear to auscultation bilaterally with good air entry   HEART: S1 S2  regular; no murmurs, gallops or rubs  ABDOMEN: Soft, Nontender, Nondistended; Bowel sounds present  EXTREMITIES: no cyanosis; no edema; no calf tenderness  SKIN: warm and dry; no rash  NERVOUS SYSTEM:  Awake and alert; Oriented  to place, person and time ; no new deficits    _________________________________________________  LABS:                        9.2    3.97  )-----------( 109      ( 16 Nov 2023 05:05 )             28.6     11-16    142  |  113<H>  |  20<H>  ----------------------------<  82  3.4<L>   |  29  |  1.00    Ca    7.9<L>      16 Nov 2023 05:05  Phos  2.3     11-16  Mg     2.0     11-16    TPro  5.4<L>  /  Alb  1.7<L>  /  TBili  1.0  /  DBili  x   /  AST  25  /  ALT  21  /  AlkPhos  64  11-16      Urinalysis Basic - ( 16 Nov 2023 05:05 )    Color: x / Appearance: x / SG: x / pH: x  Gluc: 82 mg/dL / Ketone: x  / Bili: x / Urobili: x   Blood: x / Protein: x / Nitrite: x   Leuk Esterase: x / RBC: x / WBC x   Sq Epi: x / Non Sq Epi: x / Bacteria: x      CAPILLARY BLOOD GLUCOSE            RADIOLOGY & ADDITIONAL TESTS:    Imaging  Reviewed:  YES/NO    Consultant(s) Notes Reviewed:   YES/ No      Plan of care was discussed with patient and /or primary care giver; all questions and concerns were addressed  NP Note discussed with Primary Attending    Patient is a 83y old  Male who presents with a chief complaint of orthostatic hypotension & gait instability (16 Nov 2023 14:40)      INTERVAL HPI/OVERNIGHT EVENTS: no new complaints. Denies acute complaints at time of my eval.  Denies cp or sob, no nausea or vomiting.      MEDICATIONS  (STANDING):  aspirin enteric coated 81 milliGRAM(s) Oral daily  atorvastatin 40 milliGRAM(s) Oral at bedtime  cefTRIAXone   IVPB 1000 milliGRAM(s) IV Intermittent every 24 hours  dorzolamide 2% Ophthalmic Solution 1 Drop(s) Both EYES three times a day  enoxaparin Injectable 40 milliGRAM(s) SubCutaneous every 24 hours  fludroCORTISONE 0.1 milliGRAM(s) Oral daily  folic acid 1 milliGRAM(s) Oral daily  glycopyrrolate 1 milliGRAM(s) Oral two times a day  latanoprost 0.005% Ophthalmic Solution 1 Drop(s) Both EYES at bedtime  midodrine. 30 milliGRAM(s) Oral three times a day  potassium chloride  10 mEq/100 mL IVPB 10 milliEquivalent(s) IV Intermittent every 1 hour  senna 2 Tablet(s) Oral at bedtime  thiamine 100 milliGRAM(s) Oral daily    MEDICATIONS  (PRN):  ondansetron Injectable 4 milliGRAM(s) IV Push every 8 hours PRN Nausea and/or Vomiting      __________________________________________________  REVIEW OF SYSTEMS:    CONSTITUTIONAL: No fever,   EYES: no acute visual disturbances  NECK: No pain or stiffness  RESPIRATORY: No cough; No shortness of breath  CARDIOVASCULAR: No chest pain, no palpitations  GASTROINTESTINAL: No pain. No nausea or vomiting; No diarrhea   NEUROLOGICAL: No headache or numbness, no tremors  MUSCULOSKELETAL: No joint pain, no muscle pain  GENITOURINARY: no dysuria, no frequency, no hesitancy  PSYCHIATRY: no depression , no anxiety  ALL OTHER  ROS negative        Vital Signs Last 24 Hrs  T(C): 36.6 (16 Nov 2023 14:44), Max: 37.1 (15 Nov 2023 22:03)  T(F): 97.9 (16 Nov 2023 14:44), Max: 98.7 (15 Nov 2023 22:03)  HR: 61 (16 Nov 2023 14:44) (60 - 69)  BP: 175/79 (16 Nov 2023 14:44) (130/80 - 175/79)  BP(mean): 97 (16 Nov 2023 04:55) (97 - 97)  RR: 18 (16 Nov 2023 14:44) (18 - 18)  SpO2: 96% (16 Nov 2023 14:44) (94% - 97%)    Parameters below as of 16 Nov 2023 14:44  Patient On (Oxygen Delivery Method): room air        ________________________________________________  PHYSICAL EXAM:  GENERAL: elderly male, resting comfortably in bed in NAD  HEENT: Normocephalic; conjunctivae and sclerae clear; moist mucous membranes;   NECK : supple  CHEST/LUNG: moving air bilaterally, left lungs w/ crackles at the base, + egophony on exam   HEART: S1 S2  regular; no murmurs, gallops or rubs  ABDOMEN: Soft, Nontender, Nondistended; Bowel sounds present  EXTREMITIES: no cyanosis; no edema; no calf tenderness  SKIN: warm and dry; no rash  NERVOUS SYSTEM:  Awake and alert; Oriented  to place, person and time ; no new deficits, moving all extremities equally    _________________________________________________  LABS:                        9.2    3.97  )-----------( 109      ( 16 Nov 2023 05:05 )             28.6     11-16    142  |  113<H>  |  20<H>  ----------------------------<  82  3.4<L>   |  29  |  1.00    Ca    7.9<L>      16 Nov 2023 05:05  Phos  2.3     11-16  Mg     2.0     11-16    TPro  5.4<L>  /  Alb  1.7<L>  /  TBili  1.0  /  DBili  x   /  AST  25  /  ALT  21  /  AlkPhos  64  11-16      Urinalysis Basic - ( 16 Nov 2023 05:05 )    Color: x / Appearance: x / SG: x / pH: x  Gluc: 82 mg/dL / Ketone: x  / Bili: x / Urobili: x   Blood: x / Protein: x / Nitrite: x   Leuk Esterase: x / RBC: x / WBC x   Sq Epi: x / Non Sq Epi: x / Bacteria: x      CAPILLARY BLOOD GLUCOSE            RADIOLOGY & ADDITIONAL TESTS:    Imaging  Reviewed:  YES/NO    Consultant(s) Notes Reviewed:   YES/ No      Plan of care was discussed with patient and /or primary care giver; all questions and concerns were addressed

## 2023-11-16 NOTE — CHART NOTE - NSCHARTNOTEFT_GEN_A_CORE
EVENT: Received telephone call from RN that pt is c/o of insomnia    HPI: This is an 84 y/o Male  with HTN, HLD, prior strokes, orthostatics hypotension, ETOH abuse, cirrhosis, hx slurred speech and gait instability presenting to the ED with worsening orthostatic hypotension for the past 3 days. Patient has a HHA who measures his BP x3 per day, his SBP would drop from 180 to 80 mmHg he would get associated dizziness, blurry vision, pre-syncope episodes with "almost fall episodes". Patient used to ambulate with a cane and now uses a walker for balance. Patient was recently seen by his cardiologist for Dr. Margarito Obando who wanted to add Droxidopa with midodrine to sustain blood pressure. Admitted for aspiration pneumonia and orthostatic hypotension.    SUBJECTIVE: "I need something to help me sleep"    OBJECTIVE:  Vital Signs Last 24 Hrs  T(C): 36.8 (16 Nov 2023 21:11), Max: 36.8 (16 Nov 2023 21:11)  T(F): 98.3 (16 Nov 2023 21:11), Max: 98.3 (16 Nov 2023 21:11)  HR: 66 (16 Nov 2023 21:11) (60 - 66)  BP: 152/64 (16 Nov 2023 21:11) (143/74 - 175/79)  BP(mean): 97 (16 Nov 2023 04:55) (97 - 97)  RR: 18 (16 Nov 2023 21:11) (18 - 18)  SpO2: 97% (16 Nov 2023 21:11) (95% - 97%)    Parameters below as of 16 Nov 2023 21:11  Patient On (Oxygen Delivery Method): room air    FOCUSED PHYSICAL EXAM:  Neuro: awake, alert, oriented x 3. In NAD  Cardiovascular: Pulses +2 B/L in lower and upper extremities, HR regular, BP stable, No edema.  Respiratory: Respirations regular, unlabored, breath sounds clear B/L.   GI: Abdomen soft, non-tender, positive bowel sounds.  : no bladder distention noted. No complaints at this time.  Skin: Dry, intact, no bruising, no diaphoresis.    LABS:                        9.2    3.97  )-----------( 109      ( 16 Nov 2023 05:05 )             28.6     11-16    142  |  113<H>  |  20<H>  ----------------------------<  82  3.4<L>   |  29  |  1.00    Ca    7.9<L>      16 Nov 2023 05:05  Phos  2.3     11-16  Mg     2.0     11-16    TPro  5.4<L>  /  Alb  1.7<L>  /  TBili  1.0  /  DBili  x   /  AST  25  /  ALT  21  /  AlkPhos  64  11-16      EKG:   IMAGING:    ASSESSMENT/PROBLEM: Insomnia most likely 2/2 to hospitalization      PLAN:   1. Melatonin 3 mg, PO, QHS, PRN ordered for insomnia  2. Monitor response to treatment  3. Cont present care/treatment  4. Supportive care

## 2023-11-16 NOTE — PROGRESS NOTE ADULT - PROBLEM SELECTOR PLAN 4
MRI 2022 multiple chronic cerebellar infarcts  plus progressive worsening dementia   f/u neurology eval MRI 2022 multiple chronic cerebellar infarcts  plus progressive worsening dementia   neurology following  F/u Brain and orbits MRI

## 2023-11-16 NOTE — DISCHARGE NOTE PROVIDER - NSTOBACCOUSAGEY/N_GEN_A_CS
Onset: 2 days , cough 1 month   Location / description:  Cough and Asthma. Placed on predniSONE (DELTASONE) 10 MG tablet, states breathing has become worse in the last 2 days. Able to speak in clear full sentences .  Feels like she is breathing stomach breathing. Instructed to go to the ED for eval, states [place] [facility] and [place] given. States she is at work and able to drive there. verbalized understanding.     Symptom specifc medications: albuterol (PROAIR HFA) 108 (90 Base) MCG/ACT inhaler  predniSONE (DELTASONE) 10 MG tablet  LMP : No LMP recorded. Patient is postmenopausal.    Reason for Disposition  • [1] MILD difficulty breathing (e.g., minimal/no SOB at rest, SOB with walking, pulse <100) AND [2] NEW-onset or WORSE than normal    Protocols used: BREATHING DIFFICULTY-A-AH      
No

## 2023-11-16 NOTE — DISCHARGE NOTE PROVIDER - NSDCMRMEDTOKEN_GEN_ALL_CORE_FT
aspirin 81 mg oral delayed release tablet: 1 tab(s) orally once a day  atorvastatin 40 mg oral tablet: 1 tab(s) orally once a day  dorzolamide 2% ophthalmic solution: 1 drop(s) in each eye 2 times a day  folic acid 1 mg oral tablet: 1 tab(s) orally once a day  furosemide 20 mg oral tablet: 1 tab(s) orally once a day  glycopyrrolate 1 mg oral tablet: 1 tab(s) orally 2 times a day  latanoprost 0.005% ophthalmic solution: 1 drop(s) in each eye once a day  midodrine 10 mg oral tablet: 3 tab(s) orally 3 times a day  senna leaf extract oral tablet: 2 tab(s) orally once a day (at bedtime)  thiamine 100 mg oral tablet: 1 tab(s) orally once a day   Aspirin Low Dose 81 mg oral delayed release tablet: 1 tab(s) orally once a day  atorvastatin 40 mg oral tablet: 1 tab(s) orally once a day (at bedtime)  dorzolamide 2% ophthalmic solution: 1 drop(s) to each affected eye 3 times a day  droxidopa 100 mg oral capsule: 1 cap(s) orally 2 times a day take at 6am and 6pm  folic acid 1 mg oral tablet: 1 tab(s) orally once a day  glycopyrrolate 1 mg oral tablet: 1 tab(s) orally 2 times a day  Lasix 20 mg oral tablet: 1 tab(s) orally once a day  midodrine 10 mg oral tablet: 2 tab(s) orally 3 times a day  senna leaf extract oral tablet: 2 tab(s) orally once a day (at bedtime)  thiamine 100 mg oral tablet: 1 tab(s) orally once a day  Xalatan 0.005% ophthalmic solution: 1 drop(s) to each affected eye once a day (at bedtime)

## 2023-11-16 NOTE — PROGRESS NOTE ADULT - ASSESSMENT
Mr. Samuel is an 84 y/o Male  with HTN, HLD, prior strokes, orthostatics hypotension, ETOH abuse, cirrhosis, hx slurred speech and gait instability presenting to the ED with worsening orthostatic hypotension for the past 3 days. Patient has a HHA who measures his BP x3 per day, his SBP would drop from 180 to 80 mmHg he would get associated dizziness, blurry vision, pre-syncope episodes with "almost fall episodes". Patient used to ambulate with a cane and now uses a walker for balance. Patient was recently seen by his cardiologist for Dr. Margarito Obando who wanted to add Droxidopa with midodrine to sustain blood pressure. Admitted for aspiration pneumonia and orthostatic hypotension     In ED  vitals: BP: 130/80, HR: 69, T: 36.6   s/p Azithromycin and Rocephin   1lNS, 40meq KCL   CTH, CT- C spine no acute pathology   CXR left retrocardiac opacity  Mr. Samuel is an 84 y/o Male  with HTN, HLD, prior strokes, orthostatics hypotension, ETOH abuse, cirrhosis, hx slurred speech and gait instability presenting to the ED with worsening orthostatic hypotension for the past 3 days. Patient has a HHA who measures his BP x3 per day, his SBP would drop from 180 to 80 mmHg he would get associated dizziness, blurry vision, pre-syncope episodes with "almost fall episodes". He was recently seen by his cardiologist for Dr. Margarito Obando who wanted to add Droxidopa with midodrine to sustain blood pressure. His CXR revealed left retrocardiac opacity admitted for aspiration pneumonia and orthostatic hypotension.  Case discussed w/ daughter at [facility] at bedside and pt cleared pt for MRI as per neuro recs.  Of note, pt is currently on  Ceftriaxone.

## 2023-11-16 NOTE — PROGRESS NOTE ADULT - NS ATTEND AMEND GEN_ALL_CORE FT
Patient seen at bedside, states he feels okay, no acute symptoms noted.   VS reviewed, hypertensive at rest. Orthostatic VS to be obtained.   On exam, patient is AOx3, NAD, cardiopulmonary exams unremarkable, abdomen soft, NT/ND, extremities without edema.   Labs reviewed, CBC with hgb 10.4->9.2, BMP with normal Cr, LFT with chronic hypoalbuminemia.    Assessment and plan:   83yo M with HTN, HLD, prior strokes, orthostatic hypotension, cirrhosis with recent paracentesis in 10/2023, p/w 2-week history of worsening dizziness on standing with low BP and chronically worsening bilaterally blurry vision. Found to have L sided opacity on CXR.     # Pneumonia, likely 2/2 aspiration   - CXR 11/15 with L sided opacity, possibly 2/2 aspiration iso chronic dysphagia as below   - continue CTX   - SLP evaluation     # Orthostatic hypotension   - chronic in nature, might be worsening iso acute PNA and cirrhosis   - plan at outpatient was to add droxidopa to midodrine. 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 Daughter reports all the workup was negative as the etiology of cirrhosis   - holding off lasix as it can worsen orthostatic hypotension/dizziness, but will eventually need for ascites control      # Hx of stroke   # Chronic dysphagia, gait disturbance   - Found to have stroke in 2022, and it was deemed the cause of his dysphagia, gait disturbance   - Had neuro evaluation during the prior admission 11/2022, the possibility of neurodegenerative disorder was considered. Per daughter, he has been following with neuro at clinic and did extensive workup but there has been no significant abnormality   Continue home aspirin, statin   - awaiting neuro recs as above     # DVT ppx   - Lovenox

## 2023-11-16 NOTE — DISCHARGE NOTE PROVIDER - HOSPITAL COURSE
Mr. Samuel is an 84 y/o Male  with HTN, HLD, prior strokes, orthostatics hypotension, ETOH abuse, cirrhosis, hx slurred speech and gait instability presenting to the ED with worsening orthostatic hypotension for the past 3 days. Patient has a HHA who measures his BP x3 per day, his SBP would drop from 180 to 80 mmHg he would get associated dizziness, blurry vision, pre-syncope episodes with "almost fall episodes". He was recently seen by his cardiologist for Dr. Margarito Obando who wanted to add Droxidopa with midodrine to sustain blood pressure. His CXR revealed left retrocardiac opacity admitted for aspiration pneumonia and orthostatic hypotension.  He was followed by neurology and brain and orbits MRI was performed    INCOMPLETE:::::::11/16/23     Mr. Samuel is an 82 y/o Male  with HTN, HLD, prior strokes, orthostatics hypotension, ETOH abuse, cirrhosis, hx slurred speech and gait instability presenting to the ED with worsening orthostatic hypotension for the past 3 days. Patient has a HHA who measures his BP x3 per day, his SBP would drop from 180 to 80 mmHg he would get associated dizziness, blurry vision, pre-syncope episodes with "almost fall episodes". He was recently seen by his cardiologist for Dr. Margarito Obando who wanted to add Droxidopa with midodrine to sustain blood pressure. His CXR revealed left retrocardiac opacity admitted for aspiration pneumonia and orthostatic hypotension.  He was followed by neurology and brain and orbits MRI was performed    INCOMPLETE:::::::11/16/23     Mr. Samuel is an 84 y/o Male  with HTN, HLD, prior strokes, orthostatics hypotension, ETOH abuse, cirrhosis, hx slurred speech and gait instability presenting to the ED with worsening orthostatic hypotension for the past 3 days. Patient has a HHA who measures his BP x3 per day, his SBP would drop from 180 to 80 mmHg he would get associated dizziness, blurry vision, pre-syncope episodes with "almost fall episodes". He was recently seen by his cardiologist for Dr. Margarito Obando who wanted to add Droxidopa with midodrine to sustain blood pressure. His CXR revealed left retrocardiac opacity admitted for aspiration pneumonia and orthostatic hypotension.  He was followed by neurology and brain and orbits MRI was performed. Patient completed 5 day course of antibiotics ceftriaxone. Ultrasound Abd that revealed moderate ascites 2/2 Liver Cirrhosis, s/p paracentesis 11/20 and removed 2L.   , was started on fludrocortisone since 11/16/23, however pt still has symptomatic orthostatic hypotension despite increased dosage.   Daughter was able to bring home medication droxidopa, started on 11/24/23. Dose increased  Palliative Care was consulted for GOC discussion, family decided on DNR. Amenable to Home hospices but denied. Daughter still would like to take patient home  Hypotension improved.    Medically optimized for discharge.  Discharge discussed with attending.  Note this is just a brief course for full course please refer to daily progress and consult notes. Mr. Samuel is an 82 y/o Male  with HTN, HLD, prior strokes, orthostatics hypotension, ETOH abuse, cirrhosis, hx slurred speech and gait instability presenting to the ED with worsening orthostatic hypotension for the past 3 days. Patient has a HHA who measures his BP x3 per day, his SBP would drop from 180 to 80 mmHg he would get associated dizziness, blurry vision, pre-syncope episodes with "almost fall episodes". He was recently seen by his cardiologist for Dr. Margarito Obando who wanted to add Droxidopa with midodrine to sustain blood pressure. His CXR revealed left retrocardiac opacity admitted for aspiration pneumonia and orthostatic hypotension.  He was followed by neurology and brain and orbits MRI was performed. Patient completed 5 day course of antibiotics ceftriaxone. Ultrasound Abd that revealed moderate ascites 2/2 Liver Cirrhosis, s/p paracentesis 11/20 and removed 2L.   , was started on fludrocortisone since 11/16/23, however pt still has symptomatic orthostatic hypotension despite increased dosage.   Daughter was able to bring home medication droxidopa, started on 11/24/23. Dose increased  Palliative Care was consulted for GOC discussion, family decided on DNR. Amenable to Home hospices but denied. Daughter still would like to take patient home  Hypotension improved.    Medically optimized for discharge.  Discharge discussed with attending.  Note this is just a brief course for full course please refer to daily progress and consult notes. 82 yo M with HTN, HLD, prior strokes, orthostatic hypotension, cirrhosis with recent paracentesis in 10/2023, p/w 2-week history of worsening dizziness on standing with low BP and chronically worsening bilaterally blurry vision with presentation concerning for Lewy Body dementia and dysautonomia. Found to have L sided opacity on CXR, successfully treated with 5-day course of antibiotics. He was also found to have ascites 2/2 cirrhosis, underwent therapeutic paracentesis 11/20 with 2L removal.   He was also noted to have episodes of profound orthostatic hypotension associated with dizziness. Neurology was consulted, repeat MRI brain was obtained, showing chronic ischemic changes in the brain. Medications were adjusted and BP was confirmed stable with midodrine 20mg TID and droxidopa 100mg BID. His home lasix for ascites and LE edema was held on admission in light of hypotension episodes, but was resumed givent he stable BP and gradually worsening edema since held. Palliative Care was consulted for GOC discussion, family decided on DNR. Amenable to Home hospices but declined, daughter  would like to take patient home  Now patient is medically stable for discharge home.   Note this is just a brief course for full course please refer to daily progress and consult notes. 84 yo M with HTN, HLD, prior strokes, orthostatic hypotension, cirrhosis with recent paracentesis in 10/2023, p/w 2-week history of worsening dizziness on standing with low BP and chronically worsening bilaterally blurry vision with presentation concerning for Lewy Body dementia and dysautonomia. Found to have L sided opacity on CXR, successfully treated with 5-day course of antibiotics. He was also found to have ascites 2/2 cirrhosis, underwent therapeutic paracentesis 11/20 with 2L removal.   He was also noted to have episodes of profound orthostatic hypotension associated with dizziness. Neurology was consulted, repeat MRI brain was obtained, showing chronic ischemic changes in the brain. Medications were adjusted and BP was confirmed stable with midodrine 20mg TID and droxidopa 100mg BID. His home lasix for ascites and LE edema was held on admission in light of hypotension episodes, but was resumed givent he stable BP and gradually worsening edema since held. Palliative Care was consulted for GOC discussion, family decided on DNR. Amenable to Home hospices but declined, daughter  would like to take patient home  Now patient is medically stable for discharge home.   Note this is just a brief course for full course please refer to daily progress and consult notes.

## 2023-11-17 NOTE — PROGRESS NOTE ADULT - PROBLEM SELECTOR PLAN 3
hx cirrhosis   last para was in october  abdomen US revealed ascites  Lasix restarted with parameters with parameters

## 2023-11-17 NOTE — PROGRESS NOTE ADULT - SUBJECTIVE AND OBJECTIVE BOX
NEUROLOGY FOLLOW-UP NOTE    NAME:  JE AKERS      ASSESSMENT:  83 RHM with neurogenic orthostatic hypotension, short-term memory deficit, and persistent bilateral blurry vision in the setting of likely Lewy body dementia without behavioral disturbance, with limited effectiveness of high-dose Midodrine      RECOMMENDATIONS:    - Decrease Midodrine dosage from 30mg to 20mg PO TID       - Patient should be made to stand within 15 minutes of receiving each Midodrine dose, and should not be stood up within an hour before receiving each Midodrine dose       - Check BP before each Midodrine administration, and hold if SBP > 160 mmHg    - Continue Fludrocortisone 0.1mg PO Daily       - Check BP before each Fludrocortisone administration, and hold if SBP > 160 mmHg       - Monitor for dizziness, which was a previous adverse effect of this medication in this patient    - Droxidopa has been authorized in the outpatient setting       - Once this medication is filled, this can replace Fludrocortisone 0.1mg PO Daily    - Monitor orthostatic BP & HR with each vital signs check    - Plentiful fluid hydration (2 liters, or 8 glasses, of water daily) encouraged    - Patient counseled to maintain caution with rapid changes in position    - MRI Brain w/wo Gadolinium & MRI Orbits w/wo Gadolinium are only notable for chronic cerebral atrophy, most prominent posteriorly       - Reports may be provided to the patient and daughter as requested; No additional neuroimaging indicated in the inpatient setting    - Continue PT/OT    - DVT ppx: SCDs, Enoxaparin          NOTE TO BE COMPLETED - PLEASE REFER TO ABOVE ONLY AND IGNORE INFORMATION BELOW    ******************************    HPI:  This is an 84 y/o Male  with HTN, HLD, prior strokes, orthostatics hypotension, ETOH abuse, cirrhosis, hx slurred speech and gait instability presenting to the ED with worsening orthostatic hypotension for the past 3 days. Patient has a HHA who measures his BP x3 per day, his SBP would drop from 180 to 80 mmHg he would get associated dizziness, blurry vision, pre-syncope episodes with "almost fall episodes". Patient used to ambulate with a cane and now uses a walker for balance. Patient was recently seen by his cardiologist for Dr. Margarito Obando who wanted to add Droxidopa with midodrine to sustain blood pressure, however patient never received medication pending insurance authorization. Patient's neurologist is Dr. Shaka pU who referred him to neuro-ophthalmology evaluation for blurry vision, patient had a full work up and was prescribed eye glasses however still endorses vison problems. Patient was admitted back in November 2022 for gait instability and slurred speech, he had an MRI showed chronic appearing infarcts/ neuro called for evaluation.  MRI brain 10/5/22: FLAIR changes c/w small vessel disease.  multiple chronic tiny bilateral cerebellar infarcts. small left frontal DVA; chronic post fossa subdural hygroma 6mm . Per neurology  Strokes are chronic, no acute strokes on MRI --> B/L cerebellar can contribute to unsteady gait    weakness/dysphagia and mental status changes over the last year likely 2/2 neurodegenerative d/o such as Multi system atrophy likle Lewy Body dementia. Patient had a speech and swallow evaluation this September, recommendations were soft and bite sized with mildly thickened liquids.     In ED  vitals: BP: 130/80, HR: 69, T: 36.6   s/p Azithromycin and Rocephin   1lNS, 40meq KCL   CTH, CT- C spine no acute pathology   CXR left retrocardiac opacity  (15 Nov 2023 19:28)      NEURO HPI:      INTERVAL HISTORY:  The patient has been maintained on Midodrine 30mg PO TID and Fludrocortisone 0.1mg PO Daily since yesterday. He had a drop in his systolic blood pressure into the 60s this morning when he was stood up, before receiving Midodrine. While in bed after receiving Midodrine, his systolic blood pressure jannette to the 200s.      MEDICATIONS:  aspirin enteric coated 81 milliGRAM(s) Oral daily  atorvastatin 40 milliGRAM(s) Oral at bedtime  cefTRIAXone   IVPB 1000 milliGRAM(s) IV Intermittent every 24 hours  dorzolamide 2% Ophthalmic Solution 1 Drop(s) Both EYES three times a day  enoxaparin Injectable 40 milliGRAM(s) SubCutaneous every 24 hours  fludroCORTISONE 0.1 milliGRAM(s) Oral daily  folic acid 1 milliGRAM(s) Oral daily  furosemide    Tablet 20 milliGRAM(s) Oral daily  glycopyrrolate 1 milliGRAM(s) Oral two times a day  latanoprost 0.005% Ophthalmic Solution 1 Drop(s) Both EYES at bedtime  melatonin 3 milliGRAM(s) Oral at bedtime PRN  midodrine. 30 milliGRAM(s) Oral three times a day  ondansetron Injectable 4 milliGRAM(s) IV Push every 8 hours PRN  senna 2 Tablet(s) Oral at bedtime  thiamine 100 milliGRAM(s) Oral daily      ALLERGIES:  No Known Allergies      REVIEW OF SYSTEMS:  Fourteen systems reviewed and negative except as in HPI / Interval History.          OBJECTIVE:  Vital Signs Last 24 Hrs  T(C): 36.5 (17 Nov 2023 05:11), Max: 36.8 (16 Nov 2023 21:11)  T(F): 97.7 (17 Nov 2023 05:11), Max: 98.3 (16 Nov 2023 21:11)  HR: 54 (17 Nov 2023 05:11) (54 - 66)  BP: 164/78 (17 Nov 2023 05:11) (152/64 - 164/78)  BP(mean): --  RR: 18 (17 Nov 2023 05:11) (18 - 18)  SpO2: 95% (17 Nov 2023 09:43) (94% - 97%)    Parameters below as of 17 Nov 2023 09:43  Patient On (Oxygen Delivery Method): room air        General Examination:  General: No acute distress  HEENT: Atraumatic, Normocephalic  Respiratory: CTA B/l.  No crackles, rhonchi, or wheezes.  Cardiovascular: RRR.  Normal S1 & S2.  Normal b/l radial and pedal pulses.    Neurological Examination:  General / Mental Status: AAO x 3.  No aphasia or dysarthria.  Naming and repetition intact.  Cranial Nerves: VFF x 4.  PERRL.  EOMI x 2, No nystagmus or diplopia.  B/l V1-V3 equal and intact to light touch and pinprick.  Symmetric facial movement and palate elevation.  B/l hearing equal to finger rub.  5/5 strength with b/l sternocleidomastoid and trapezius.  Midline tongue protrusion, with no atrophy or fasciculations.  Motor: Normal bulk & tone in all four extremities.  5/5 strength throughout all four extremities.  No downward drift, rigidity, spasticity, or tremors in any of the four extremities.  Sensory: Intact to light touch and pinprick in all four extremities.  Negative Romberg.  Reflex: 2+ and symmetric at b/l biceps, triceps, brachioradialis, patellae, and ankles.  Downgoing toes b/l.  Coordination: No dysmetria with b/l finger-to-nose and heel raise tests.  Symmetric rapid alternating movements b/l.  Gait: Normal, narrow-based gait.  No difficulty with tiptoe, heel, and tandem gaits.        LABORATORY VALUES:                          11.8   8.64  )-----------( 154      ( 17 Nov 2023 07:43 )             36.8       11-17    141  |  112<H>  |  15  ----------------------------<  99  3.8   |  27  |  0.96    Ca    8.0<L>      17 Nov 2023 07:43  Phos  2.3     11-16  Mg     2.0     11-16    TPro  5.4<L>  /  Alb  1.7<L>  /  TBili  1.0  /  DBili  x   /  AST  25  /  ALT  21  /  AlkPhos  64  11-16      LIVER FUNCTIONS - ( 16 Nov 2023 05:05 )  Alb: 1.7 g/dL / Pro: 5.4 g/dL / ALK PHOS: 64 U/L / ALT: 21 U/L DA / AST: 25 U/L / GGT: x                 Glucose Trend  11-17-23 @ 07:43   -  99 -- --  11-16-23 @ 05:05   -  82 -- --  11-15-23 @ 13:20   -  86 -- --  11-15-23 @ 11:41   -  -- -- 84                    NEUROIMAGING:          Please contact the Neurology consult service with any neurological questions.      Omar Black MD   of Neurology  [facility]         NEUROLOGY FOLLOW-UP NOTE    NAME:  JE AKERS      ASSESSMENT:  83 RHM with neurogenic orthostatic hypotension, short-term memory deficit, and persistent bilateral blurry vision in the setting of likely Lewy body dementia without behavioral disturbance, with limited effectiveness of high-dose Midodrine      RECOMMENDATIONS:    - Decrease Midodrine dosage from 30mg to 20mg PO TID       - Patient should be made to stand within 15 minutes of receiving each Midodrine dose, and should not be stood up within an hour before receiving each Midodrine dose       - Check BP before each Midodrine administration, and hold if SBP > 160 mmHg    - Continue Fludrocortisone 0.1mg PO Daily       - Check BP before each Fludrocortisone administration, and hold if SBP > 160 mmHg       - Monitor for dizziness, which was a previous adverse effect of this medication in this patient    - Droxidopa has been authorized in the outpatient setting       - Once this medication is filled, this can replace Fludrocortisone 0.1mg PO Daily    - Monitor orthostatic BP & HR with each vital signs check    - Plentiful fluid hydration (2 liters, or 8 glasses, of water daily) encouraged    - Patient counseled to maintain caution with rapid changes in position    - MRI Brain w/wo Gadolinium & MRI Orbits w/wo Gadolinium are only notable for chronic cerebral atrophy, most prominent posteriorly       - Reports may be provided to the patient and daughter as requested; No additional neuroimaging indicated in the inpatient setting    - Continue PT/OT    - DVT ppx: SCDs, Enoxaparin          ******************************    HPI:  This is an 84 y/o Male  with HTN, HLD, prior strokes, orthostatics hypotension, ETOH abuse, cirrhosis, hx slurred speech and gait instability presenting to the ED with worsening orthostatic hypotension for the past 3 days. Patient has a HHA who measures his BP x3 per day, his SBP would drop from 180 to 80 mmHg he would get associated dizziness, blurry vision, pre-syncope episodes with "almost fall episodes". Patient used to ambulate with a cane and now uses a walker for balance. Patient was recently seen by his cardiologist for Dr. Margarito Obando who wanted to add Droxidopa with midodrine to sustain blood pressure, however patient never received medication pending insurance authorization. Patient's neurologist is Dr. Shaka Up who referred him to neuro-ophthalmology evaluation for blurry vision, patient had a full work up and was prescribed eye glasses however still endorses vison problems. Patient was admitted back in November 2022 for gait instability and slurred speech, he had an MRI showed chronic appearing infarcts/ neuro called for evaluation.  MRI brain 10/5/22: FLAIR changes c/w small vessel disease.  Multiple chronic tiny bilateral cerebellar infarcts. Small left frontal DVA; chronic post fossa subdural hygroma 6mm . Per neurology  Strokes are chronic, no acute strokes on MRI --> B/L cerebellar can contribute to unsteady gait weakness/dysphagia and mental status changes over the last year likely 2/2 neurodegenerative d/o such as Multi system atrophy like Lewy Body dementia. Patient had a speech and swallow evaluation this September, recommendations were soft and bite sized with mildly thickened liquids.   In ED  vitals: BP: 130/80, HR: 69, T: 36.6   s/p Azithromycin and Rocephin   1lNS, 40meq KCL   CTH, CT- C spine no acute pathology   CXR left retrocardiac opacity  (15 Nov 2023 19:28)      NEURO HPI:  83M with neurodegenerative disease, concernign for Lewy body dementia without behavioral disturbance and with neurogenic orthostatic hypotension, with a two-week history of episodes of drops in blood pressure upon standing, not responsive with gradual increase in Midodrine dosage from 5mg to 30mg PO TID over the past four months. He was also on Fludrocortisone 0.1mg PO Daily earlier this year, but this was stopped when he experienced dizziness with it. He was recently ordered to start Droxidopa by his cardiologist, but this has not been authorized by his health insurance plan.      INTERVAL HISTORY:  The patient has been maintained on Midodrine 30mg PO TID and Fludrocortisone 0.1mg PO Daily since yesterday. He had a drop in his systolic blood pressure into the 60s this morning when he was stood up, before receiving Midodrine. While in bed after receiving Midodrine, his systolic blood pressure jannette to the 200s.      MEDICATIONS:  aspirin enteric coated 81 milliGRAM(s) Oral daily  atorvastatin 40 milliGRAM(s) Oral at bedtime  cefTRIAXone   IVPB 1000 milliGRAM(s) IV Intermittent every 24 hours  dorzolamide 2% Ophthalmic Solution 1 Drop(s) Both EYES three times a day  enoxaparin Injectable 40 milliGRAM(s) SubCutaneous every 24 hours  fludroCORTISONE 0.1 milliGRAM(s) Oral daily  folic acid 1 milliGRAM(s) Oral daily  furosemide    Tablet 20 milliGRAM(s) Oral daily  glycopyrrolate 1 milliGRAM(s) Oral two times a day  latanoprost 0.005% Ophthalmic Solution 1 Drop(s) Both EYES at bedtime  melatonin 3 milliGRAM(s) Oral at bedtime PRN  midodrine. 30 milliGRAM(s) Oral three times a day  ondansetron Injectable 4 milliGRAM(s) IV Push every 8 hours PRN  senna 2 Tablet(s) Oral at bedtime  thiamine 100 milliGRAM(s) Oral daily      ALLERGIES:  No Known Allergies      REVIEW OF SYSTEMS:  Fourteen systems reviewed and negative except as in HPI / Interval History.          OBJECTIVE:  Vital Signs Last 24 Hrs  T(C): 36.5 (17 Nov 2023 05:11), Max: 36.8 (16 Nov 2023 21:11)  T(F): 97.7 (17 Nov 2023 05:11), Max: 98.3 (16 Nov 2023 21:11)  HR: 54 (17 Nov 2023 05:11) (54 - 66)  BP: 164/78 (17 Nov 2023 05:11) (152/64 - 164/78)  RR: 18 (17 Nov 2023 05:11) (18 - 18)  SpO2: 95% (17 Nov 2023 09:43) (94% - 97%)  Parameters below as of 17 Nov 2023 09:43  Patient On (Oxygen Delivery Method): room air      General Examination:  General: No acute distress  HEENT: Atraumatic, Normocephalic  Respiratory: CTA B/l.  No crackles, rhonchi, or wheezes.  Cardiovascular: Low heart rate, Regular rhythm.  Normal b/l radial and pedal pulses.    Neurological Examination:  General / Mental Status: AAO x 2 (not to date).  No aphasia or dysarthria.  Naming and repetition intact.  Immediate recall: 3/3, 5-minute delayed recall: 1/3 (recalls only 1 other word with MCQ cue).  Cranial Nerves: Subjective blurry vision, but with VFF x 4 with each eye individually.  PERRL.  EOMI x 2.  No nystagmus, gaze preference, or diplopia.  B/l V1-V3 equal and intact to light touch and pinprick.  Symmetric facial movement and palate elevation.  B/l hearing equal to finger rub.  5/5 strength with b/l sternocleidomastoid and trapezius.  Midline tongue protrusion, with no atrophy or fasciculations.  Motor: Normal bulk & tone in all four extremities.  At least 4/5 strength throughout all four extremities.  No downward drift, rigidity, spasticity, or tremors in any of the four extremities.  Sensory: Intact to light touch and pinprick in all four extremities.  Reflex: 1+ and symmetric at b/l biceps, triceps, brachioradialis, patellae, and ankles.  Downgoing toes b/l.  Coordination: No dysmetria with b/l finger-to-nose and heel raise tests.  Gait and Romberg sign testing deferred per patient preference.          LABORATORY VALUES:                          11.8   8.64  )-----------( 154      ( 17 Nov 2023 07:43 )             36.8       11-17    141  |  112<H>  |  15  ----------------------------<  99  3.8   |  27  |  0.96    Ca    8.0<L>      17 Nov 2023 07:43  Phos  2.3     11-16  Mg     2.0     11-16    TPro  5.4<L>  /  Alb  1.7<L>  /  TBili  1.0  /  DBili  x   /  AST  25  /  ALT  21  /  AlkPhos  64  11-16    Glucose Trend  11-17-23 @ 07:43   -  99 -- --  11-16-23 @ 05:05   -  82 -- --  11-15-23 @ 13:20   -  86 -- --  11-15-23 @ 11:41   -  -- -- 84          NEUROIMAGING:      CT Head (11/15/23):  - No acute intracranial structural abnormality  - Chronic periventricular microvascular changes  - Mild diffuse atrophy      CT Cervical Spine (11/15/23):  - No acute fracture  - Multilevel malalignment and degenerative changes  - Straightening of lordosis          Please contact the Neurology consult service with any neurological questions.      Omar Black MD   of Neurology  [facility]

## 2023-11-17 NOTE — PROGRESS NOTE ADULT - ASSESSMENT
Mr. Samuel is an 82 y/o Male  with HTN, HLD, prior strokes, orthostatics hypotension, ETOH abuse, cirrhosis, hx slurred speech and gait instability presenting to the ED with worsening orthostatic hypotension for the past 3 days. Patient has a HHA who measures his BP x3 per day, his SBP would drop from 180 to 80 mmHg he would get associated dizziness, blurry vision, pre-syncope episodes with "almost fall episodes". He was recently seen by his cardiologist for Dr. Margarito Obando who wanted to add Droxidopa with midodrine to sustain blood pressure. His CXR revealed left retrocardiac opacity admitted for aspiration pneumonia and orthostatic hypotension.

## 2023-11-17 NOTE — PROGRESS NOTE ADULT - PROBLEM SELECTOR PLAN 1
p/w worsening orthostatic hypotension for 3 days  c/w home midodrine 30mg--> changed 20 mg TID with parameters   No Droxidopa in hospital available    positive orthostatics   restart lasix in setting of HTN   Fludrocortizone added per neuro

## 2023-11-17 NOTE — PROGRESS NOTE ADULT - SUBJECTIVE AND OBJECTIVE BOX
NP Note discussed with  primary attending    Patient is a 83y old  Male who presents with a chief complaint of orthostatic hypotension & gait instability (17 Nov 2023 13:52)      INTERVAL HPI/OVERNIGHT EVENTS: no new complaints, pt seen at bedside, with orthostatic hypotension.     MEDICATIONS  (STANDING):  aspirin enteric coated 81 milliGRAM(s) Oral daily  atorvastatin 40 milliGRAM(s) Oral at bedtime  cefTRIAXone   IVPB 1000 milliGRAM(s) IV Intermittent every 24 hours  dorzolamide 2% Ophthalmic Solution 1 Drop(s) Both EYES three times a day  enoxaparin Injectable 40 milliGRAM(s) SubCutaneous every 24 hours  fludroCORTISONE 0.1 milliGRAM(s) Oral daily  folic acid 1 milliGRAM(s) Oral daily  furosemide    Tablet 20 milliGRAM(s) Oral daily  glycopyrrolate 1 milliGRAM(s) Oral two times a day  hydrALAZINE Injectable 5 milliGRAM(s) IV Push once  latanoprost 0.005% Ophthalmic Solution 1 Drop(s) Both EYES at bedtime  midodrine. 20 milliGRAM(s) Oral three times a day  senna 2 Tablet(s) Oral at bedtime  thiamine 100 milliGRAM(s) Oral daily    MEDICATIONS  (PRN):  melatonin 3 milliGRAM(s) Oral at bedtime PRN Insomnia  ondansetron Injectable 4 milliGRAM(s) IV Push every 8 hours PRN Nausea and/or Vomiting      __________________________________________________  REVIEW OF SYSTEMS:    CONSTITUTIONAL: No fever,   EYES: no acute visual disturbances  NECK: No pain or stiffness  RESPIRATORY: No cough; No shortness of breath  CARDIOVASCULAR: No chest pain, no palpitations  GASTROINTESTINAL: No pain. No nausea or vomiting; No diarrhea   NEUROLOGICAL: dizziness when standing up   MUSCULOSKELETAL: No joint pain, no muscle pain, weakness   GENITOURINARY: no dysuria, no frequency, no hesitancy  PSYCHIATRY: no depression , no anxiety  ALL OTHER  ROS negative        Vital Signs Last 24 Hrs  T(C): 36.6 (17 Nov 2023 16:53), Max: 36.8 (16 Nov 2023 21:11)  T(F): 97.9 (17 Nov 2023 16:53), Max: 98.3 (16 Nov 2023 21:11)  HR: 61 (17 Nov 2023 16:53) (54 - 66)  BP: 204/82 (17 Nov 2023 16:53) (152/64 - 204/82)  BP(mean): --  RR: 18 (17 Nov 2023 16:53) (18 - 18)  SpO2: 97% (17 Nov 2023 16:53) (94% - 97%)    Parameters below as of 17 Nov 2023 16:53  Patient On (Oxygen Delivery Method): room air        ________________________________________________  PHYSICAL EXAM:  GENERAL: NAD  HEENT: Normocephalic;  conjunctivae and sclerae clear; moist mucous membranes;   NECK : supple  CHEST/LUNG:  diminished to auscultation bilaterally with good air entry   HEART: S1 S2  regular; no murmurs, gallops or rubs  ABDOMEN: Soft, Nontender, Nondistended; Bowel sounds present  EXTREMITIES: no cyanosis; no edema; no calf tenderness, generalized weakness  SKIN: warm and dry; no rash  NERVOUS SYSTEM:  Awake and alert; Oriented  to place, person and time ; no new deficits    _________________________________________________  LABS:                        11.8   8.64  )-----------( 154      ( 17 Nov 2023 07:43 )             36.8     11-17    141  |  112<H>  |  15  ----------------------------<  99  3.8   |  27  |  0.96    Ca    8.0<L>      17 Nov 2023 07:43  Phos  2.3     11-16  Mg     2.0     11-16    TPro  5.4<L>  /  Alb  1.7<L>  /  TBili  1.0  /  DBili  x   /  AST  25  /  ALT  21  /  AlkPhos  64  11-16      Urinalysis Basic - ( 17 Nov 2023 07:43 )    Color: x / Appearance: x / SG: x / pH: x  Gluc: 99 mg/dL / Ketone: x  / Bili: x / Urobili: x   Blood: x / Protein: x / Nitrite: x   Leuk Esterase: x / RBC: x / WBC x   Sq Epi: x / Non Sq Epi: x / Bacteria: x      CAPILLARY BLOOD GLUCOSE            RADIOLOGY & ADDITIONAL TESTS:    < from: MR Head w/wo IV Cont (11.16.23 @ 17:30) >  IMPRESSION: Mild chronic microvascular changes. No acute infarction.   Unremarkable orbits.    < end of copied text >      Imaging Personally Reviewed:  YES/NO    Consultant(s) Notes Reviewed:   YES/ No    Care Discussed with Consultants :     Plan of care was discussed with patient and /or primary care giver; all questions and concerns were addressed and care was aligned with patient's wishes.

## 2023-11-17 NOTE — PROGRESS NOTE ADULT - NS ATTEND AMEND GEN_ALL_CORE FT
Patient was noted to be hypotensive with sBP of 60s upon standing this morning. Now BP is higher than normal range. Seen at bedside, states he feels okay when lying in the bed.      On exam, patient is AOx3, NAD, cardiopulmonary exams unremarkable, abdomen soft, NT/ND, extremities without edema.      Labs reviewed, CBC with improving hgb, BMP with normal Cr.     Assessment and plan:    81yo M with HTN, HLD, prior strokes, orthostatic hypotension, cirrhosis with recent paracentesis in 10/2023, p/w 2-week history of worsening dizziness on standing with low BP and chronically worsening bilaterally blurry vision. Found to have L sided opacity on CXR.      # Pneumonia, likely 2/2 aspiration    - CXR 11/15 with L sided opacity, possibly 2/2 aspiration iso chronic dysphagia as below    - continue CTX, anticipating 5-day course   - SLP evaluation        # Orthostatic hypotension    - chronic in nature, might be worsening iso acute PNA and cirrhosis    - neurology recs appreciated, modify the midodrine dose. Patient was instructed on medication intake before standing. I discussed with neurology Dr. Black   - plan at outpatient was to add droxidopa to midodrine. 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 Daughter reports all the workup was negative as the etiology of cirrhosis    - resumed on lasix as BP at rest is high, continue with parameters     # Hx of stroke    # Chronic dysphagia, gait disturbance    - Found to have stroke in 2022, and it was deemed the cause of his dysphagia, gait disturbance    - Brain MRI only notable for chronic cerebral atrophy   - Continue home aspirin, statin      # DVT ppx    - Lovenox.

## 2023-11-17 NOTE — PHYSICAL THERAPY INITIAL EVALUATION ADULT - DIAGNOSIS, PT EVAL
(ICF Model) Pt. present w/deficits in Body Structures/Function (Impairments), incl: Strength, Balance, Endurance, leading to deficits in performing the below noted Activities (Limitations).

## 2023-11-17 NOTE — PROGRESS NOTE ADULT - PROBLEM SELECTOR PLAN 4
MRI 2022 multiple chronic cerebellar infarcts  plus progressive worsening dementia   neurology following  Brain and orbits MRI showed - notable for chronic cerebral atrophy, most prominent posteriorly

## 2023-11-18 NOTE — PROGRESS NOTE ADULT - SUBJECTIVE AND OBJECTIVE BOX
[facility]  Patient is a 83y old  Male who presents with a chief complaint of orthostatic hypotension & gait instability (17 Nov 2023 17:50)      SUBJECTIVE / OVERNIGHT EVENTS:  No acute events over night.   patient seen at bedside, sitting in chair, states he has dizziness all time but did not experience worsening dizziness when he moved from bed to chair this morning.  Denies any fevers/chills, headache, CP, SOB, abd pain, N/V/D, constipation, or leg swelling.       MEDICATIONS  (STANDING):  aspirin enteric coated 81 milliGRAM(s) Oral daily  atorvastatin 40 milliGRAM(s) Oral at bedtime  cefTRIAXone   IVPB 1000 milliGRAM(s) IV Intermittent every 24 hours  dorzolamide 2% Ophthalmic Solution 1 Drop(s) Both EYES three times a day  enoxaparin Injectable 40 milliGRAM(s) SubCutaneous every 24 hours  fludroCORTISONE 0.1 milliGRAM(s) Oral daily  folic acid 1 milliGRAM(s) Oral daily  furosemide    Tablet 20 milliGRAM(s) Oral daily  glycopyrrolate 1 milliGRAM(s) Oral two times a day  latanoprost 0.005% Ophthalmic Solution 1 Drop(s) Both EYES at bedtime  midodrine. 20 milliGRAM(s) Oral three times a day  senna 2 Tablet(s) Oral at bedtime  thiamine 100 milliGRAM(s) Oral daily    MEDICATIONS  (PRN):  melatonin 3 milliGRAM(s) Oral at bedtime PRN Insomnia  ondansetron Injectable 4 milliGRAM(s) IV Push every 8 hours PRN Nausea and/or Vomiting          OBJECTIVE:  Vital Signs Last 24 Hrs  T(C): 36.5 (18 Nov 2023 05:17), Max: 36.6 (17 Nov 2023 16:53)  T(F): 97.7 (18 Nov 2023 05:17), Max: 97.9 (17 Nov 2023 16:53)  HR: 71 (18 Nov 2023 12:11) (58 - 71)  BP: 100/56 (18 Nov 2023 12:11) (100/56 - 204/82)  BP(mean): 86 (17 Nov 2023 20:26) (86 - 86)  RR: 18 (18 Nov 2023 05:17) (18 - 18)  SpO2: 94% (18 Nov 2023 05:17) (94% - 97%)    Parameters below as of 18 Nov 2023 05:17  Patient On (Oxygen Delivery Method): room air        PHYSICAL EXAM:  GENERAL: NAD  HEAD:  Atraumatic, Normocephalic  EYES: conjunctiva and sclera clear  NECK: Supple, No JVD  CHEST/LUNG: Clear to auscultation bilaterally; No wheeze  HEART: Regular rate and rhythm; No murmurs, rubs, or gallops  ABDOMEN: Soft, Nontender, Nondistended; Bowel sounds present  EXTREMITIES:  No clubbing, cyanosis, or edema  PSYCH: AAOx3  NEUROLOGY: non-focal  SKIN: No rashes or lesions      CAPILLARY BLOOD GLUCOSE        I&O's Summary            LABS:                        9.7    5.16  )-----------( 109      ( 18 Nov 2023 05:05 )             30.0     11-18    141  |  110<H>  |  13  ----------------------------<  92  3.3<L>   |  27  |  0.96    Ca    7.5<L>      18 Nov 2023 05:05            Urinalysis Basic - ( 18 Nov 2023 05:05 )    Color: x / Appearance: x / SG: x / pH: x  Gluc: 92 mg/dL / Ketone: x  / Bili: x / Urobili: x   Blood: x / Protein: x / Nitrite: x   Leuk Esterase: x / RBC: x / WBC x   Sq Epi: x / Non Sq Epi: x / Bacteria: x            RADIOLOGY & ADDITIONAL TESTS:

## 2023-11-18 NOTE — PROGRESS NOTE ADULT - ASSESSMENT
81yo M with HTN, HLD, prior strokes, orthostatic hypotension, cirrhosis with recent paracentesis in 10/2023, p/w 2-week history of worsening dizziness on standing with low BP and chronically worsening bilaterally blurry vision. Found to have L sided opacity on CXR, being treated for PNA.      # Pneumonia, likely 2/2 aspiration    - CXR 11/15 with L sided opacity, possibly 2/2 aspiration iso chronic dysphagia as below    - continue CTX, anticipating 5-day course (11/15-)  - pending SLP evaluation        # Orthostatic hypotension    - chronic in nature, might be worsening iso acute PNA and cirrhosis    - neurology recs appreciated, modify the midodrine dose. Patient was instructed on medication intake before standing. I discussed with neurology Dr. Black   - plan at outpatient was to add droxidopa to midodrine. 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 Daughter reports all the workup was negative as the etiology of cirrhosis    - continue home lasix with parameters     # Hx of stroke    # Chronic dysphagia, gait disturbance    - Found to have stroke in 2022, and it was deemed the cause of his dysphagia, gait disturbance    - Brain MRI only notable for chronic cerebral atrophy   - Continue home aspirin, statin      # DVT ppx    - Lovenox.

## 2023-11-19 NOTE — PROGRESS NOTE ADULT - SUBJECTIVE AND OBJECTIVE BOX
[facility]  Patient is a 83y old  Male who presents with a chief complaint of orthostatic hypotension & gait instability (19 Nov 2023 10:03)      SUBJECTIVE / OVERNIGHT EVENTS:  No acute events over night.   Today patient feels good but complains of bilateral blurry vision, worse than yesterday. Denies eye pain, itching, lacrimation, discharge.    MEDICATIONS  (STANDING):  aspirin enteric coated 81 milliGRAM(s) Oral daily  atorvastatin 40 milliGRAM(s) Oral at bedtime  cefTRIAXone   IVPB 1000 milliGRAM(s) IV Intermittent every 24 hours  dorzolamide 2% Ophthalmic Solution 1 Drop(s) Both EYES three times a day  enoxaparin Injectable 40 milliGRAM(s) SubCutaneous every 24 hours  fludroCORTISONE 0.1 milliGRAM(s) Oral daily  folic acid 1 milliGRAM(s) Oral daily  furosemide    Tablet 20 milliGRAM(s) Oral daily  glycopyrrolate 1 milliGRAM(s) Oral two times a day  latanoprost 0.005% Ophthalmic Solution 1 Drop(s) Both EYES at bedtime  midodrine. 20 milliGRAM(s) Oral three times a day  potassium phosphate / sodium phosphate Powder (PHOS-NaK) 2 Packet(s) Oral two times a day  senna 2 Tablet(s) Oral at bedtime  thiamine 100 milliGRAM(s) Oral daily    MEDICATIONS  (PRN):  ondansetron Injectable 4 milliGRAM(s) IV Push every 8 hours PRN Nausea and/or Vomiting          OBJECTIVE:  Vital Signs Last 24 Hrs  T(C): 36.8 (19 Nov 2023 13:03), Max: 36.8 (19 Nov 2023 13:03)  T(F): 98.2 (19 Nov 2023 13:03), Max: 98.2 (19 Nov 2023 13:03)  HR: 60 (19 Nov 2023 05:17) (60 - 65)  BP: 136/71 (19 Nov 2023 05:17) (104/60 - 136/71)  BP(mean): --  RR: 18 (19 Nov 2023 13:03) (16 - 18)  SpO2: 94% (19 Nov 2023 13:03) (94% - 98%)    Parameters below as of 19 Nov 2023 13:03  Patient On (Oxygen Delivery Method): room air        PHYSICAL EXAM:  GENERAL: NAD  HEAD:  Atraumatic, Normocephalic  EYES: conjunctiva and sclera clear  NECK: Supple, No JVD  CHEST/LUNG: Clear to auscultation bilaterally; No wheeze  HEART: Regular rate and rhythm; No murmurs, rubs, or gallops  ABDOMEN: Soft, Nontender, Nondistended; Bowel sounds present  EXTREMITIES:  No clubbing, cyanosis, or edema  PSYCH: AAOx3  NEUROLOGY: non-focal  SKIN: No rashes or lesions      CAPILLARY BLOOD GLUCOSE        I&O's Summary            LABS:                        10.4   5.90  )-----------( 126      ( 19 Nov 2023 06:41 )             31.6     11-19    141  |  110<H>  |  14  ----------------------------<  106<H>  3.3<L>   |  28  |  0.93    Ca    7.7<L>      19 Nov 2023 06:41  Phos  2.1     11-19  Mg     1.8     11-19            Urinalysis Basic - ( 19 Nov 2023 06:41 )    Color: x / Appearance: x / SG: x / pH: x  Gluc: 106 mg/dL / Ketone: x  / Bili: x / Urobili: x   Blood: x / Protein: x / Nitrite: x   Leuk Esterase: x / RBC: x / WBC x   Sq Epi: x / Non Sq Epi: x / Bacteria: x            RADIOLOGY & ADDITIONAL TESTS:

## 2023-11-19 NOTE — PROGRESS NOTE ADULT - ASSESSMENT
81yo M with HTN, HLD, prior strokes, orthostatic hypotension, cirrhosis with recent paracentesis in 10/2023, p/w 2-week history of worsening dizziness on standing with low BP and chronically worsening bilaterally blurry vision. Found to have L sided opacity on CXR, being treated for PNA.      # Pneumonia, likely 2/2 aspiration    - CXR 11/15 with L sided opacity, possibly 2/2 aspiration iso chronic dysphagia as below    - continue CTX, anticipating 5-day course (11/15-, last dose toaay)  - pending SLP evaluation        # Orthostatic hypotension    # blurry vision, bilateral  - chronic in nature, might be worsening iso acute PNA and cirrhosis    - neurology recs appreciated, midodrine dose modified. Patient was instructed on medication intake before standing  - plan at outpatient was to add droxidopa to midodrine. 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 Daughter reports all the workup was negative as the etiology of cirrhosis    - continue home lasix with parameters   - consult IR 11/20 for possible inpatient paracentesis    # Hx of stroke    # Chronic dysphagia, gait disturbance    - Found to have stroke in 2022, and it was deemed the cause of his dysphagia, gait disturbance    - Brain MRI only notable for chronic cerebral atrophy   - Continue home aspirin, statin      # DVT ppx    - Lovenox.

## 2023-11-19 NOTE — PROGRESS NOTE ADULT - SUBJECTIVE AND OBJECTIVE BOX
NEUROLOGY FOLLOW-UP NOTE    NAME:  JE AKERS      ASSESSMENT:  83 RHM with neurogenic orthostatic hypotension, short-term memory deficit, and persistent bilateral blurry vision in the setting of likely Lewy body dementia without behavioral disturbance, with limited effectiveness of high-dose Midodrine      RECOMMENDATIONS:    - Continue Midodrine 20mg PO TID       - Patient should be made to stand within 15 minutes of receiving each Midodrine dose, and should not be stood up within an hour before receiving each Midodrine dose       - Check BP before each Midodrine administration, and hold if SBP > 160 mmHg    - Continue Fludrocortisone 0.1mg PO Daily       - Check BP before each Fludrocortisone administration, and hold if SBP > 160 mmHg       - Monitor for dizziness, which was a previous adverse effect of this medication in this patient    - Droxidopa has been authorized in the outpatient setting       - Once this medication is filled, this can replace Fludrocortisone 0.1mg PO Daily    - Monitor orthostatic BP & HR with each vital signs check    - Plentiful fluid hydration (2 liters, or 8 glasses, of water daily) encouraged    - Patient counseled to maintain caution with rapid changes in position    - MRI Brain w/wo Gadolinium & MRI Orbits w/wo Gadolinium are only notable for chronic cerebral atrophy, most prominent posteriorly       - Reports may be provided to the patient and daughter as requested; No additional neuroimaging indicated in the inpatient setting    - Continue PT/OT    - DVT ppx: SCDs, Enoxaparin    - Routine follow-up with established neurologist in December 2023 as scheduled          NOTE TO BE COMPLETED - PLEASE REFER TO ABOVE ONLY AND IGNORE INFORMATION BELOW    ******************************    HPI:  This is an 84 y/o Male  with HTN, HLD, prior strokes, orthostatics hypotension, ETOH abuse, cirrhosis, hx slurred speech and gait instability presenting to the ED with worsening orthostatic hypotension for the past 3 days. Patient has a HHA who measures his BP x3 per day, his SBP would drop from 180 to 80 mmHg he would get associated dizziness, blurry vision, pre-syncope episodes with "almost fall episodes". Patient used to ambulate with a cane and now uses a walker for balance. Patient was recently seen by his cardiologist for Dr. Margarito Obando who wanted to add Droxidopa with midodrine to sustain blood pressure, however patient never received medication pending insurance authorization. Patient's neurologist is Dr. Shaka Up who referred him to neuro-ophthalmology evaluation for blurry vision, patient had a full work up and was prescribed eye glasses however still endorses vison problems. Patient was admitted back in November 2022 for gait instability and slurred speech, he had an MRI showed chronic appearing infarcts/ neuro called for evaluation.  MRI brain 10/5/22: FLAIR changes c/w small vessel disease.  multiple chronic tiny bilateral cerebellar infarcts. small left frontal DVA; chronic post fossa subdural hygroma 6mm . Per neurology  Strokes are chronic, no acute strokes on MRI --> B/L cerebellar can contribute to unsteady gait    weakness/dysphagia and mental status changes over the last year likely 2/2 neurodegenerative d/o such as Multi system atrophy likle Lewy Body dementia. Patient had a speech and swallow evaluation this September, recommendations were soft and bite sized with mildly thickened liquids.     In ED  vitals: BP: 130/80, HR: 69, T: 36.6   s/p Azithromycin and Rocephin   1lNS, 40meq KCL   CTH, CT- C spine no acute pathology   CXR left retrocardiac opacity  (15 Nov 2023 19:28)      NEURO HPI:      INTERVAL HISTORY:  The patient continues to endorse blurry vision with both eyes throughout the day, as well as lightheadedness when transferring from the bed to the chair, but has not had any spikes in his blood pressure overnight.      MEDICATIONS:  aspirin enteric coated 81 milliGRAM(s) Oral daily  atorvastatin 40 milliGRAM(s) Oral at bedtime  cefTRIAXone   IVPB 1000 milliGRAM(s) IV Intermittent every 24 hours  dorzolamide 2% Ophthalmic Solution 1 Drop(s) Both EYES three times a day  enoxaparin Injectable 40 milliGRAM(s) SubCutaneous every 24 hours  fludroCORTISONE 0.1 milliGRAM(s) Oral daily  folic acid 1 milliGRAM(s) Oral daily  furosemide    Tablet 20 milliGRAM(s) Oral daily  glycopyrrolate 1 milliGRAM(s) Oral two times a day  latanoprost 0.005% Ophthalmic Solution 1 Drop(s) Both EYES at bedtime  midodrine. 20 milliGRAM(s) Oral three times a day  ondansetron Injectable 4 milliGRAM(s) IV Push every 8 hours PRN  potassium phosphate / sodium phosphate Powder (PHOS-NaK) 2 Packet(s) Oral two times a day  senna 2 Tablet(s) Oral at bedtime  thiamine 100 milliGRAM(s) Oral daily      ALLERGIES:  No Known Allergies      REVIEW OF SYSTEMS:  Fourteen systems reviewed and negative except as in HPI / Interval History.          OBJECTIVE:  Vital Signs Last 24 Hrs  T(C): 36.7 (19 Nov 2023 05:17), Max: 36.7 (19 Nov 2023 05:17)  T(F): 98.1 (19 Nov 2023 05:17), Max: 98.1 (19 Nov 2023 05:17)  HR: 60 (19 Nov 2023 05:17) (60 - 71)  BP: 136/71 (19 Nov 2023 05:17) (100/56 - 136/71)  BP(mean): --  RR: 17 (19 Nov 2023 05:17) (16 - 17)  SpO2: 94% (19 Nov 2023 05:17) (94% - 98%)    Parameters below as of 19 Nov 2023 05:17  Patient On (Oxygen Delivery Method): room air        General Examination:  General: No acute distress  HEENT: Atraumatic, Normocephalic  Respiratory: CTA B/l.  No crackles, rhonchi, or wheezes.  Cardiovascular: RRR.  Normal S1 & S2.  Normal b/l radial and pedal pulses.    Neurological Examination:  General / Mental Status: AAO x 3.  No aphasia or dysarthria.  Naming and repetition intact.  Cranial Nerves: VFF x 4.  PERRL.  EOMI x 2, No nystagmus or diplopia.  B/l V1-V3 equal and intact to light touch and pinprick.  Symmetric facial movement and palate elevation.  B/l hearing equal to finger rub.  5/5 strength with b/l sternocleidomastoid and trapezius.  Midline tongue protrusion, with no atrophy or fasciculations.  Motor: Normal bulk & tone in all four extremities.  5/5 strength throughout all four extremities.  No downward drift, rigidity, spasticity, or tremors in any of the four extremities.  Sensory: Intact to light touch and pinprick in all four extremities.  Negative Romberg.  Reflex: 2+ and symmetric at b/l biceps, triceps, brachioradialis, patellae, and ankles.  Downgoing toes b/l.  Coordination: No dysmetria with b/l finger-to-nose and heel raise tests.  Symmetric rapid alternating movements b/l.  Gait: Normal, narrow-based gait.  No difficulty with tiptoe, heel, and tandem gaits.        LABORATORY VALUES:                          10.4   5.90  )-----------( 126      ( 19 Nov 2023 06:41 )             31.6       11-19    141  |  110<H>  |  14  ----------------------------<  106<H>  3.3<L>   |  28  |  0.93    Ca    7.7<L>      19 Nov 2023 06:41  Phos  2.1     11-19  Mg     1.8     11-19                Glucose Trend  11-19-23 @ 06:41   -  106<H> -- --  11-18-23 @ 05:05   -  92 -- --  11-17-23 @ 07:43   -  99 -- --                    NEUROIMAGING:          Please contact the Neurology consult service with any neurological questions.      Omar Black MD   of Neurology  [facility] at [facility]         NEUROLOGY FOLLOW-UP NOTE    NAME:  JE AKERS      ASSESSMENT:  83 RHM with neurogenic orthostatic hypotension, short-term memory deficit, and persistent bilateral blurry vision in the setting of likely Lewy body dementia without behavioral disturbance, with limited effectiveness of high-dose Midodrine      RECOMMENDATIONS:    - Continue Midodrine 20mg PO TID       - Patient should be made to stand within 15 minutes of receiving each Midodrine dose, and should not be stood up within an hour before receiving each Midodrine dose       - Check BP before each Midodrine administration, and hold if SBP > 160 mmHg    - Continue Fludrocortisone 0.1mg PO Daily       - Check BP before each Fludrocortisone administration, and hold if SBP > 160 mmHg       - Monitor for dizziness, which was a previous adverse effect of this medication in this patient    - Droxidopa has been authorized in the outpatient setting       - Once this medication is filled, this can replace Fludrocortisone 0.1mg PO Daily    - Monitor orthostatic BP & HR with each vital signs check    - Plentiful fluid hydration (2 liters, or 8 glasses, of water daily) encouraged    - Patient counseled to maintain caution with rapid changes in position    - MRI Brain w/wo Gadolinium & MRI Orbits w/wo Gadolinium are only notable for chronic cerebral atrophy, most prominent posteriorly       - Reports may be provided to the patient and daughter as requested; No additional neuroimaging indicated in the inpatient setting    - Continue PT/OT    - DVT ppx: SCDs, Enoxaparin    - Routine follow-up with established neurologist in December 2023 as scheduled          ******************************    HPI:  This is an 84 y/o Male  with HTN, HLD, prior strokes, orthostatics hypotension, ETOH abuse, cirrhosis, hx slurred speech and gait instability presenting to the ED with worsening orthostatic hypotension for the past 3 days. Patient has a HHA who measures his BP x3 per day, his SBP would drop from 180 to 80 mmHg he would get associated dizziness, blurry vision, pre-syncope episodes with "almost fall episodes". Patient used to ambulate with a cane and now uses a walker for balance. Patient was recently seen by his cardiologist for Dr. Margarito Obando who wanted to add Droxidopa with midodrine to sustain blood pressure, however patient never received medication pending insurance authorization. Patient's neurologist is Dr. Shaka Up who referred him to neuro-ophthalmology evaluation for blurry vision, patient had a full work up and was prescribed eye glasses however still endorses vison problems. Patient was admitted back in November 2022 for gait instability and slurred speech, he had an MRI showed chronic appearing infarcts/ neuro called for evaluation.  MRI brain 10/5/22: FLAIR changes c/w small vessel disease.  Multiple chronic tiny bilateral cerebellar infarcts. Small left frontal DVA; chronic post fossa subdural hygroma 6mm . Per neurology  Strokes are chronic, no acute strokes on MRI --> B/L cerebellar can contribute to unsteady gait weakness/dysphagia and mental status changes over the last year likely 2/2 neurodegenerative d/o such as Multi system atrophy like Lewy Body dementia. Patient had a speech and swallow evaluation this September, recommendations were soft and bite sized with mildly thickened liquids.   In ED  vitals: BP: 130/80, HR: 69, T: 36.6   s/p Azithromycin and Rocephin   1lNS, 40meq KCL   CTH, CT- C spine no acute pathology   CXR left retrocardiac opacity  (15 Nov 2023 19:28)      NEURO HPI:  83M with neurodegenerative disease, concernign for Lewy body dementia without behavioral disturbance and with neurogenic orthostatic hypotension, with a two-week history of episodes of drops in blood pressure upon standing, not responsive with gradual increase in Midodrine dosage from 5mg to 30mg PO TID over the past four months. He was also on Fludrocortisone 0.1mg PO Daily earlier this year, but this was stopped when he experienced dizziness with it. He was recently ordered to start Droxidopa by his cardiologist, but this has not been authorized by his health insurance plan.      INTERVAL HISTORY:  The patient continues to endorse blurry vision with both eyes throughout the day, as well as lightheadedness when transferring from the bed to the chair, but has not had any spikes in his blood pressure overnight.      MEDICATIONS:  aspirin enteric coated 81 milliGRAM(s) Oral daily  atorvastatin 40 milliGRAM(s) Oral at bedtime  cefTRIAXone   IVPB 1000 milliGRAM(s) IV Intermittent every 24 hours  dorzolamide 2% Ophthalmic Solution 1 Drop(s) Both EYES three times a day  enoxaparin Injectable 40 milliGRAM(s) SubCutaneous every 24 hours  fludroCORTISONE 0.1 milliGRAM(s) Oral daily  folic acid 1 milliGRAM(s) Oral daily  furosemide    Tablet 20 milliGRAM(s) Oral daily  glycopyrrolate 1 milliGRAM(s) Oral two times a day  latanoprost 0.005% Ophthalmic Solution 1 Drop(s) Both EYES at bedtime  midodrine. 20 milliGRAM(s) Oral three times a day  ondansetron Injectable 4 milliGRAM(s) IV Push every 8 hours PRN  potassium phosphate / sodium phosphate Powder (PHOS-NaK) 2 Packet(s) Oral two times a day  senna 2 Tablet(s) Oral at bedtime  thiamine 100 milliGRAM(s) Oral daily      ALLERGIES:  No Known Allergies      REVIEW OF SYSTEMS:  Fourteen systems reviewed and negative except as in HPI / Interval History.          OBJECTIVE:  Vital Signs Last 24 Hrs  T(C): 36.7 (19 Nov 2023 05:17), Max: 36.7 (19 Nov 2023 05:17)  T(F): 98.1 (19 Nov 2023 05:17), Max: 98.1 (19 Nov 2023 05:17)  HR: 60 (19 Nov 2023 05:17) (60 - 71)  BP: 136/71 (19 Nov 2023 05:17) (100/56 - 136/71)  RR: 17 (19 Nov 2023 05:17) (16 - 17)  SpO2: 94% (19 Nov 2023 05:17) (94% - 98%)  Parameters below as of 19 Nov 2023 05:17  Patient On (Oxygen Delivery Method): room air      General Examination:  General: No acute distress  HEENT: Atraumatic, Normocephalic  Respiratory: CTA B/l.  No crackles, rhonchi, or wheezes.  Cardiovascular: RRR.  Normal b/l radial and pedal pulses.    Neurological Examination:  General / Mental Status: AAO x 2 (not to date).  No aphasia or dysarthria.  Naming and repetition intact.  Immediate recall: 3/3, 5-minute delayed recall: 2/3 (recalls third word with MCQ cue).  Cranial Nerves: Subjective blurry vision, but with VFF x 4 with each eye individually.  PERRL.  EOMI x 2.  No nystagmus, gaze preference, or diplopia.  B/l V1-V3 equal and intact to light touch and pinprick.  Symmetric facial movement and palate elevation.  B/l hearing equal to finger rub.  5/5 strength with b/l sternocleidomastoid and trapezius.  Midline tongue protrusion, with no atrophy or fasciculations.  Motor: Normal bulk & tone in all four extremities.  At least 4/5 strength throughout all four extremities.  No downward drift, rigidity, spasticity, or tremors in any of the four extremities.  Sensory: Intact to light touch and pinprick in all four extremities.  Reflex: 1+ and symmetric at b/l biceps, triceps, brachioradialis, patellae, and ankles.  Downgoing toes b/l.  Coordination: No dysmetria with b/l finger-to-nose and heel raise tests.  Gait and Romberg sign testing deferred per patient preference.          LABORATORY VALUES:                          10.4   5.90  )-----------( 126      ( 19 Nov 2023 06:41 )             31.6       11-19    141  |  110<H>  |  14  ----------------------------<  106<H>  3.3<L>   |  28  |  0.93    Ca    7.7<L>      19 Nov 2023 06:41  Phos  2.1     11-19  Mg     1.8     11-19      Glucose Trend  11-19-23 @ 06:41   -  106<H> -- --  11-18-23 @ 05:05   -  92 -- --  11-17-23 @ 07:43   -  99 -- --          NEUROIMAGING:      CT Head (11/15/23):  - No acute intracranial structural abnormality  - Chronic periventricular microvascular changes  - Mild diffuse atrophy      CT Cervical Spine (11/15/23):  - No acute fracture  - Multilevel malalignment and degenerative changes  - Straightening of lordosis          Please contact the Neurology consult service with any neurological questions.      Omar Black MD   of Neurology  [facility] at [facility]

## 2023-11-20 NOTE — PROGRESS NOTE ADULT - PROBLEM SELECTOR PLAN 1
p/w worsening orthostatic hypotension for 3 days  c/w home midodrine 30mg--> changed 20 mg TID with parameters   No Droxidopa in hospital available    c/w fludrocortisone 0.1 mg daily  Neurology Dr. Black followed

## 2023-11-20 NOTE — PROGRESS NOTE ADULT - SUBJECTIVE AND OBJECTIVE BOX
Patient is a 83y old  Male who presents with a chief complaint of orthostatic hypotension & gait instability (20 Nov 2023 12:07)    OVERNIGHT EVENTS: no acute changes.     Pt still continues to have orthostatic hypotension. Pt is aox3, comfortable appearing, laying in bed.     REVIEW OF SYSTEMS:  CONSTITUTIONAL: +No fever, chills  ENMT:  No difficulty hearing, no change in vision  NECK: No pain or stiffness  RESPIRATORY: No cough, SOB  CARDIOVASCULAR: No chest pain, palpitations  GASTROINTESTINAL: +constipation. No abdominal pain. No nausea, vomiting, or diarrhea  GENITOURINARY: No dysuria  NEUROLOGICAL: No HA  SKIN: No itching, burning, rashes, or lesions   LYMPH NODES: No enlarged glands  ENDOCRINE: No heat or cold intolerance; No hair loss  MUSCULOSKELETAL: No joint pain or swelling; No muscle, back, or extremity pain  PSYCHIATRIC: No depression, anxiety  HEME/LYMPH: No easy bruising, or bleeding gums    T(C): 36.7 (11-20-23 @ 13:02), Max: 37.1 (11-19-23 @ 20:19)  HR: 69 (11-20-23 @ 13:02) (60 - 88)  BP: 129/62 (11-20-23 @ 04:07) (129/62 - 137/66)  RR: 18 (11-20-23 @ 13:02) (18 - 18)  SpO2: 99% (11-20-23 @ 13:02) (95% - 99%)  Wt(kg): --Vital Signs Last 24 Hrs  T(C): 36.7 (20 Nov 2023 13:02), Max: 37.1 (19 Nov 2023 20:19)  T(F): 98.1 (20 Nov 2023 13:02), Max: 98.7 (19 Nov 2023 20:19)  HR: 69 (20 Nov 2023 13:02) (60 - 88)  BP: 129/62 (20 Nov 2023 04:07) (129/62 - 137/66)  BP(mean): --  RR: 18 (20 Nov 2023 13:02) (18 - 18)  SpO2: 99% (20 Nov 2023 13:02) (95% - 99%)    Parameters below as of 20 Nov 2023 13:02  Patient On (Oxygen Delivery Method): room air        MEDICATIONS  (STANDING):  aspirin enteric coated 81 milliGRAM(s) Oral daily  atorvastatin 40 milliGRAM(s) Oral at bedtime  dorzolamide 2% Ophthalmic Solution 1 Drop(s) Both EYES three times a day  enoxaparin Injectable 40 milliGRAM(s) SubCutaneous every 24 hours  fludroCORTISONE 0.1 milliGRAM(s) Oral daily  folic acid 1 milliGRAM(s) Oral daily  furosemide    Tablet 20 milliGRAM(s) Oral daily  glycopyrrolate 1 milliGRAM(s) Oral two times a day  latanoprost 0.005% Ophthalmic Solution 1 Drop(s) Both EYES at bedtime  midodrine. 20 milliGRAM(s) Oral three times a day  senna 2 Tablet(s) Oral at bedtime  thiamine 100 milliGRAM(s) Oral daily    MEDICATIONS  (PRN):  ondansetron Injectable 4 milliGRAM(s) IV Push every 8 hours PRN Nausea and/or Vomiting      PHYSICAL EXAM:  GENERAL: NAD  EYES: clear conjunctiva; EOMI  ENMT: Moist mucous membranes  NECK: Supple, No JVD, Normal thyroid  CHEST/LUNG: Clear to auscultation bilaterally; No rales, rhonchi, wheezing, or rubs  HEART: S1, S2, Regular rate and rhythm  ABDOMEN: Soft, Nontender, Nondistended; Bowel sounds present  NEURO: Alert & Oriented X3  EXTREMITIES: No LE edema, no calf tenderness  LYMPH: No lymphadenopathy noted  SKIN: No rashes or lesions    Consultant(s) Notes Reviewed:  [x ] YES  [ ] NO  Care Discussed with Consultants/Other Providers [ x] YES  [ ] NO    LABS:                        10.4   8.57  )-----------( 150      ( 20 Nov 2023 07:51 )             31.7     11-20    140  |  107  |  14  ----------------------------<  89  3.5   |  31  |  0.96    Ca    8.0<L>      20 Nov 2023 07:51  Phos  2.7     11-20  Mg     1.9     11-20        CAPILLARY BLOOD GLUCOSE            Urinalysis Basic - ( 20 Nov 2023 07:51 )    Color: x / Appearance: x / SG: x / pH: x  Gluc: 89 mg/dL / Ketone: x  / Bili: x / Urobili: x   Blood: x / Protein: x / Nitrite: x   Leuk Esterase: x / RBC: x / WBC x   Sq Epi: x / Non Sq Epi: x / Bacteria: x        RADIOLOGY & ADDITIONAL TESTS:    Imaging Personally Reviewed:  [ ] YES  [ ] NO   Patient is a 83y old  Male who presents with a chief complaint of orthostatic hypotension & gait instability (20 Nov 2023 12:07)    OVERNIGHT EVENTS: no acute changes.     Pt still continues to have orthostatic hypotension. Pt is aox3, comfortable appearing, laying in bed.     REVIEW OF SYSTEMS:  CONSTITUTIONAL: +No fever, chills  ENMT:  No difficulty hearing, no change in vision  NECK: No pain or stiffness  RESPIRATORY: No cough, SOB  CARDIOVASCULAR: No chest pain, palpitations  GASTROINTESTINAL: +constipation. No abdominal pain. No nausea, vomiting, or diarrhea  GENITOURINARY: No dysuria  NEUROLOGICAL: No HA  SKIN: No itching, burning, rashes, or lesions   LYMPH NODES: No enlarged glands  ENDOCRINE: No heat or cold intolerance; No hair loss  MUSCULOSKELETAL: No joint pain or swelling; No muscle, back, or extremity pain  PSYCHIATRIC: No depression, anxiety  HEME/LYMPH: No easy bruising, or bleeding gums    T(C): 36.7 (11-20-23 @ 13:02), Max: 37.1 (11-19-23 @ 20:19)  HR: 69 (11-20-23 @ 13:02) (60 - 88)  BP: 129/62 (11-20-23 @ 04:07) (129/62 - 137/66)  RR: 18 (11-20-23 @ 13:02) (18 - 18)  SpO2: 99% (11-20-23 @ 13:02) (95% - 99%)  Wt(kg): --Vital Signs Last 24 Hrs  T(C): 36.7 (20 Nov 2023 13:02), Max: 37.1 (19 Nov 2023 20:19)  T(F): 98.1 (20 Nov 2023 13:02), Max: 98.7 (19 Nov 2023 20:19)  HR: 69 (20 Nov 2023 13:02) (60 - 88)  BP: 129/62 (20 Nov 2023 04:07) (129/62 - 137/66)  BP(mean): --  RR: 18 (20 Nov 2023 13:02) (18 - 18)  SpO2: 99% (20 Nov 2023 13:02) (95% - 99%)    Parameters below as of 20 Nov 2023 13:02  Patient On (Oxygen Delivery Method): room air        MEDICATIONS  (STANDING):  aspirin enteric coated 81 milliGRAM(s) Oral daily  atorvastatin 40 milliGRAM(s) Oral at bedtime  dorzolamide 2% Ophthalmic Solution 1 Drop(s) Both EYES three times a day  enoxaparin Injectable 40 milliGRAM(s) SubCutaneous every 24 hours  fludroCORTISONE 0.1 milliGRAM(s) Oral daily  folic acid 1 milliGRAM(s) Oral daily  furosemide    Tablet 20 milliGRAM(s) Oral daily  glycopyrrolate 1 milliGRAM(s) Oral two times a day  latanoprost 0.005% Ophthalmic Solution 1 Drop(s) Both EYES at bedtime  midodrine. 20 milliGRAM(s) Oral three times a day  senna 2 Tablet(s) Oral at bedtime  thiamine 100 milliGRAM(s) Oral daily    MEDICATIONS  (PRN):  ondansetron Injectable 4 milliGRAM(s) IV Push every 8 hours PRN Nausea and/or Vomiting      PHYSICAL EXAM:  GENERAL: NAD  EYES: clear conjunctiva; EOMI  ENMT: Moist mucous membranes  NECK: Supple, No JVD, Normal thyroid  CHEST/LUNG: Clear to auscultation bilaterally; No rales, rhonchi, wheezing, or rubs  HEART: S1, S2, Regular rate and rhythm  ABDOMEN: Soft, Nontender, Nondistended; Bowel sounds present  NEURO: Alert & Oriented X3  EXTREMITIES: No LE edema, no calf tenderness  LYMPH: No lymphadenopathy noted  SKIN: No rashes or lesions    Consultant(s) Notes Reviewed:  [x ] YES  [ ] NO  Care Discussed with Consultants/Other Providers [ x] YES  [ ] NO    LABS:                        10.4   8.57  )-----------( 150      ( 20 Nov 2023 07:51 )             31.7     11-20    140  |  107  |  14  ----------------------------<  89  3.5   |  31  |  0.96    Ca    8.0<L>      20 Nov 2023 07:51  Phos  2.7     11-20  Mg     1.9     11-20        CAPILLARY BLOOD GLUCOSE            Urinalysis Basic - ( 20 Nov 2023 07:51 )    Color: x / Appearance: x / SG: x / pH: x  Gluc: 89 mg/dL / Ketone: x  / Bili: x / Urobili: x   Blood: x / Protein: x / Nitrite: x   Leuk Esterase: x / RBC: x / WBC x   Sq Epi: x / Non Sq Epi: x / Bacteria: x        RADIOLOGY & ADDITIONAL TESTS:  < from: MR Head w/wo IV Cont (11.16.23 @ 17:30) >    ACC: [id number] EXAM:  MR ORBITS ONLY WAWIC   ORDERED BY: EASTON BLAKELY     ACC: [id number] EXAM:  MR BRAIN WAW IC   ORDERED BY: EASTON BLAKELY     PROCEDURE DATE:  11/16/2023          INTERPRETATION:  CLINICAL INDICATIONS: Near syncope    COMPARISON: Head CT dated 11/15/2023.    TECHNIQUE: MRI brain and orbits: Multiplanar, multisequence MR imaging of   the brain are obtained with and the administration of 7.5 cc intravenous   Gadavist contrast. 0 cc of contrast was discarded.    FINDINGS:    MRI orbits:  The patient is status post bilateral ocular lens replacement surgery. The   globes are intact. No retrobulbar mass. Extraocular muscles are   symmetric. No abnormal optic nerve enhancement. Optic chiasm is   unremarkable.    MRI BRAIN:  There isno abnormal restricted diffusion to suggest acute infarction.   Scattered periventricular and subcortical white matter T2 /FLAIR   hyperintensities are seen without mass effect, nonspecific, likely   representing mild chronic microvascular changes. Normal T2 flow-voids are   seen within  the intracranial vasculature. The lateral ventricles and   cortical sulci are age-appropriate in size and configuration. There is no   mass, mass effect, or extra-axial fluid collection. 5 mm susceptibility   artifact in left parietal lobe on image 53 of series 8 and may suggest a   small cavernoma.. Midline structures are normal.   Mild inflammatory   mucosal changes are seen throughout the various portions of the paranasal   sinuses. The mastoid air cells are unremarkable.      IMPRESSION: Mild chronic microvascular changes. No acute infarction.   Unremarkable orbits.    --- End of Report ---            TREMAYNE PAREDES MD; Attending Radiologist  This document has been electronically signed. Nov 17 2023  9:27AM    < end of copied text >      Imaging Personally Reviewed:  [ ] YES  [ ] NO

## 2023-11-20 NOTE — PROGRESS NOTE ADULT - PROBLEM SELECTOR PLAN 3
pt has chronic dysphagia with slurred speech   seen by speech and swallow specialist this september with recommendation of soft and bite sized with mod thick liquids  CXR Left retrocardiac opacity, new from previous imaging   likely aspiration  s/p Rocephin and azithromycin in ED  aspiration precautions hgb 10.4  stable no signs of active bleeding   c/w home med 1 mg daily

## 2023-11-20 NOTE — PROGRESS NOTE ADULT - PROBLEM SELECTOR PLAN 2
hx cirrhosis   last para was in october  abdomen US 11/17 revealed moderate ascites  c/w lasix 20 mg daily  IR consulted for paracentesis

## 2023-11-20 NOTE — PROGRESS NOTE ADULT - PROBLEM SELECTOR PLAN 4
likely in setting of vascular dementia and orthostatic hypotension  MRI 2022 multiple chronic cerebellar infarcts  Brain and orbits MRI showed - notable for chronic cerebral atrophy, most prominent posteriorly  neurology following  PT rec home PT pt has chronic dysphagia with slurred speech   seen by speech and swallow specialist this september with recommendation of soft and bite sized with mod thick liquids  CXR Left retrocardiac opacity, new from previous imaging   likely aspiration  s/p Rocephin and azithromycin in ED  aspiration precautions

## 2023-11-20 NOTE — H&P ADULT - ASSESSMENT
The patient has been re-examined and I agree with the above assessment or I updated with my findings.
81yo M pmhx of orthostatics Hypotension , Edema , HLD comes to ED w/ slurred speech, difficulty swallowing,  worsening over the past month.   overall sx started one month ago and pt was seen by neuro Dr. Navya Roblero..MRI per daughter showed "multiple strokes "   he was seen and worked up by cardio inclduing echo and 24 hr holter but jarquin "was negative "    Denies chest pain, SOB, fall, trauma, n/v/d, sick contacts, urinary symptoms.  reports cough to solids   has difficulty swallowing but Patient denies hx of CHF however takes lasix regularly for peripheral edema?     - Slurred speech/ dysphagia:  neuro checks   neuro consult   monitor BP ( elevated in ED )   cardio input     - dysphagia : npo   s/s     - cirrhosis on CT : will check US     - edema:? sec t chf +/- florinef   lasix ffor now   fu orthostatics

## 2023-11-20 NOTE — PROGRESS NOTE ADULT - PROBLEM SELECTOR PLAN 6
hx of chronic strokes  add aspirin 81 mg ec daily  c/w atorvastatin 40 mg daily c/w thiamine 100 mg daily

## 2023-11-20 NOTE — PROGRESS NOTE ADULT - ASSESSMENT
84 y/o Male  with HTN, HLD, prior strokes, orthostatic hypotension, ETOH abuse, cirrhosis, hx slurred speech and gait instability presenting to the ED with worsening orthostatic hypotension for the past 3 days. Patient has a HHA who measures his BP x3 per day, his SBP would drop from 180 to 80 mmHg he would get associated dizziness, blurry vision, pre-syncope episodes with "almost fall episodes". He was recently seen by his cardiologist for Dr. Margarito Obando who wanted to add Droxidopa with midodrine to sustain blood pressure. His CXR revealed left retrocardiac opacity admitted for aspiration pneumonia and orthostatic hypotension.   Patient completed 5 day course of antibiotics ceftriaxone. Pt was also started on fludrocortisone.   Pt had a Ultrasound Abd that revealed moderate ascites, IR was consulted for paracentesis.   Once optimized, pt is for home PT.

## 2023-11-20 NOTE — PROGRESS NOTE ADULT - NS ATTEND AMEND GEN_ALL_CORE FT
Patient seen at bedside, states he feels better, his mind is clearer than yesterday, no acute symptoms noted.  On exam, patient is AOx3, NAD, cardiopulmonary exams unremarkable, abdomen soft mildly distended, NT/ND, extremities without edema.   Labs reviewed, CBC and BMP stable.    Assessment and plan:  81yo M with HTN, HLD, prior strokes, orthostatic hypotension, cirrhosis with recent paracentesis in 10/2023, p/w 2-week history of worsening dizziness on standing with low BP and chronically worsening bilaterally blurry vision. Found to have L sided opacity on CXR, being treated for PNA.      # Pneumonia, likely 2/2 aspiration    - CXR 11/15 with L sided opacity, possibly 2/2 aspiration iso chronic dysphagia as below    - s/p 5-day course of CTX    # Orthostatic hypotension    # blurry vision, bilateral  - chronic in nature, might be worsening iso acute PNA and cirrhosis    - neurology recs appreciated, midodrine dose modified. Patient was instructed on medication intake before standing  - plan at outpatient was to add droxidopa to midodrine. 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 Daughter reports all the workup was negative as the etiology of cirrhosis    - continue home lasix with parameters   - contacting IR for possible inpatient paracentesis    # Hx of stroke    # Chronic dysphagia, gait disturbance    - Found to have stroke in 2022, and it was deemed the cause of his dysphagia, gait disturbance    - Brain MRI only notable for chronic cerebral atrophy   - Continue home aspirin, statin      # DVT ppx    - Lovenox. Patient seen at bedside, states he feels better, his mind is clearer than yesterday, no acute symptoms noted.  On exam, patient is AOx3, NAD, cardiopulmonary exams unremarkable, abdomen soft mildly distended, NT/ND, extremities without edema.   Labs reviewed, CBC and BMP stable.    Assessment and plan:  81yo M with HTN, HLD, prior strokes, orthostatic hypotension, cirrhosis with recent paracentesis in 10/2023, p/w 2-week history of worsening dizziness on standing with low BP and chronically worsening bilaterally blurry vision. Found to have L sided opacity on CXR, treated for PNA.      # Pneumonia, likely 2/2 aspiration    - CXR 11/15 with L sided opacity, possibly 2/2 aspiration iso chronic dysphagia as below    - s/p 5-day course of CTX    # Orthostatic hypotension    # blurry vision, bilateral  - chronic in nature, might be worsening iso acute PNA and cirrhosis    - neurology recs appreciated, midodrine dose modified. Patient was instructed on medication intake before standing  - plan at outpatient was to add droxidopa to midodrine. 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 Daughter reports all the workup was negative as the etiology of cirrhosis    - continue home lasix with parameters   - contacting IR for possible inpatient paracentesis    # Hx of stroke    # Chronic dysphagia, gait disturbance    - Found to have stroke in 2022, and it was deemed the cause of his dysphagia, gait disturbance    - Brain MRI only notable for chronic cerebral atrophy   - Continue home aspirin, statin      # DVT ppx    - Lovenox.

## 2023-11-20 NOTE — PROGRESS NOTE ADULT - PROBLEM SELECTOR PLAN 5
assist with feedings, toileting  aspiration and fall precautions likely in setting of vascular dementia, chronic alcoholism, orthostatic hypotension  MRI 2022 multiple chronic cerebellar infarcts  Brain and orbits MRI showed - notable for chronic cerebral atrophy, most prominent posteriorly  neurology following  PT rec home PT

## 2023-11-21 NOTE — PROGRESS NOTE ADULT - PROBLEM SELECTOR PLAN 3
multifactorial, likely in setting of vascular dementia, chronic alcoholism, orthostatic hypotension  MRI 2022 multiple chronic cerebellar infarcts  Brain and orbits MRI showed - notable for chronic cerebral atrophy, most prominent posteriorly  neurology following  PT rec home PT

## 2023-11-21 NOTE — PROCEDURE NOTE - PROCEDURE FINDINGS AND DETAILS
2000 cc serous fluid drained. Catheter removed and a sterile  dressing applied.  Specimens were obtained and sent for a complete evaluation including cytology.

## 2023-11-21 NOTE — CHART NOTE - NSCHARTNOTEFT_GEN_A_CORE
I called patient's Cardiologist Dr. Margarito Obando at [phone]. He was not in the office. I left my number for callback.     Per the patient's daughter, the outpatient pharmacy is still awaiting delivery of the patient's droxidopa. I called patient's Cardiologist Dr. Margarito Obando at [phone]. He was not in the office. I left my number for callback.     Per the patient's daughter, the outpatient pharmacy is still awaiting delivery of the patient's droxidopa.    -------------------------    Received call back from Dr. Obando. He still suggests next agent to be droxidopa. He acknowledges this is going to be a very difficult problem to manage.

## 2023-11-21 NOTE — PROGRESS NOTE ADULT - ASSESSMENT
82 y/o Male  with HTN, HLD, prior strokes, orthostatic hypotension, ETOH abuse, cirrhosis, hx slurred speech and gait instability presenting to the ED with worsening orthostatic hypotension for the past 3 days. Patient has a HHA who measures his BP x3 per day, his SBP would drop from 180 to 80 mmHg he would get associated dizziness, blurry vision, pre-syncope episodes with "almost fall episodes". He was recently seen by his cardiologist for Dr. Margarito Obando who wanted to add Droxidopa with midodrine to sustain blood pressure. His CXR revealed left retrocardiac opacity admitted for aspiration pneumonia and orthostatic hypotension.   Patient completed 5 day course of antibiotics ceftriaxone.     Pt was also started on fludrocortisone since 11/16/23, however pt still has symptomatic orthostatic hypotension. Daughter will plan to bring in home medication droxidopa to see if there will be any improvement.   GOC was completed, pt is DNR/DNI, Molst in chart.   Pt also had a Ultrasound Abd that revealed moderate ascites, IR was consulted for paracentesis.     Once optimized, pt is for home PT.        82 y/o Male  with HTN, HLD, prior strokes, orthostatic hypotension, ETOH abuse, cirrhosis, hx slurred speech and gait instability presenting to the ED with worsening orthostatic hypotension for the past 3 days. Patient has a HHA who measures his BP x3 per day, his SBP would drop from 180 to 80 mmHg he would get associated dizziness, blurry vision, pre-syncope episodes with "almost fall episodes". He was recently seen by his cardiologist for Dr. Margarito Obando who wanted to add Droxidopa with midodrine to sustain blood pressure. His CXR revealed left retrocardiac opacity admitted for aspiration pneumonia and orthostatic hypotension.   Patient completed 5 day course of antibiotics ceftriaxone.     Pt was also started on fludrocortisone since 11/16/23, however pt still has symptomatic orthostatic hypotension. Daughter will plan to bring in home medication droxidopa to see if there will be any improvement.   GOC was completed, pt is DNR/DNI, Molst in chart.   Pt also had a Ultrasound Abd that revealed moderate ascites, IR was consulted for paracentesis.   Fludrocortisone now increased to 0.1 mg BID. Pending further improvement     Once optimized, pt is for home PT.        84 y/o Male  with HTN, HLD, prior strokes, orthostatic hypotension, ETOH abuse, cirrhosis, hx slurred speech and gait instability presenting to the ED with worsening orthostatic hypotension for the past 3 days. Patient has a HHA who measures his BP x3 per day, his SBP would drop from 180 to 80 mmHg he would get associated dizziness, blurry vision, pre-syncope episodes with "almost fall episodes". He was recently seen by his cardiologist for Dr. Margarito Obando who wanted to add Droxidopa with midodrine to sustain blood pressure. His CXR revealed left retrocardiac opacity admitted for aspiration pneumonia and orthostatic hypotension.   Patient completed 5 day course of antibiotics ceftriaxone.     Pt was also started on fludrocortisone since 11/16/23, however pt still has symptomatic orthostatic hypotension. Daughter will plan to bring in home medication droxidopa to see if there will be any improvement.   GOC was completed, pt is DNR/DNI, Molst in chart.   Pt also had a Ultrasound Abd that revealed moderate ascites, IR was consulted for paracentesis. Pt is not reporting any abdominal pain. Pt may benefit from paracentesis due to plan to discontinue lasix to assist in improving orthostatic hypotension. Fludrocortisone now increased to 0.1 mg BID. Pending further improvement     Once optimized, pt is for home PT.

## 2023-11-21 NOTE — PROGRESS NOTE ADULT - NS ATTEND AMEND GEN_ALL_CORE FT
I have seen and evaluated the patient seen at bedside. He begins by saying "I am a little bit clearer" but then goes on to admit that he does not feel much better and still feels like "he is in a blur." No new symptoms. Hoping to have paracentesis performed.     I discussed his case with outside Cardiologist Dr. Obando, see separate chart note.     I had long bedside discussion with patient's daughter Viji Prabhakar. I was honest with her and said that while I am hopeful the droxidopa will provide symptomatic benefit, I am worried about its potential efficacy.     On exam, patient is sitting upright in the chair in no acute distress. He does seem to have masked facies but otherwise no focal abnormalities on neurological examination. Cardiopulmonary exam without incident. Orthostatic vital signs continue to be profoundly positive with drop in SBP from 170 to 60s systolic, accompanied by severe symptoms. Continues to have significant supine hypertension as well.     Assessment and plan:  81yo M with HTN, HLD, prior strokes, orthostatic hypotension, cirrhosis with recent paracentesis in 10/2023, p/w 2-week history of worsening dizziness on standing with low BP and chronically worsening bilaterally blurry vision. Found to have L sided opacity on CXR, treated for PNA.  At this time, course most concerning for profound orthostatic hypotension that has proved to be very difficult to control.     # Pneumonia, likely 2/2 aspiration    - CXR 11/15 with L sided opacity, possibly 2/2 aspiration iso chronic dysphagia as below    - s/p 5-day course of CTX    # Orthostatic hypotension    # blurry vision, bilateral  - chronic in nature, might be worsening iso acute PNA and cirrhosis    - neurology recs appreciated, midodrine dose modified. Patient was instructed on medication intake before standing  - plan at outpatient was to add droxidopa to midodrine. 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 Daughter reports all the workup was negative as the etiology of cirrhosis    - continue home lasix with parameters   - contacting IR for possible inpatient paracentesis this admission     # Hx of stroke    # Chronic dysphagia, gait disturbance    - Found to have stroke in 2022, and it was deemed the cause of his dysphagia, gait disturbance    - Brain MRI only notable for chronic cerebral atrophy   - Continue home aspirin, statin      # DVT ppx    - Lovenox.

## 2023-11-21 NOTE — PROGRESS NOTE ADULT - SUBJECTIVE AND OBJECTIVE BOX
Patient is a 83y old  Male who presents with a chief complaint of orthostatic hypotension & gait instability (20 Nov 2023 12:07)    OVERNIGHT EVENTS: no acute changes.     Pt is aox3, eating well, comfortable appearing.     REVIEW OF SYSTEMS:  CONSTITUTIONAL: No fever, chills  ENMT:  No difficulty hearing, no change in vision  NECK: No pain or stiffness  RESPIRATORY: No cough, SOB  CARDIOVASCULAR: No chest pain, palpitations  GASTROINTESTINAL: No abdominal pain. No nausea, vomiting, or diarrhea  GENITOURINARY: No dysuria  NEUROLOGICAL: +dizziness. No HA  SKIN: No itching, burning, rashes, or lesions   LYMPH NODES: No enlarged glands  ENDOCRINE: No heat or cold intolerance; No hair loss  MUSCULOSKELETAL: No joint pain or swelling; No muscle, back, or extremity pain  PSYCHIATRIC: No depression, anxiety  HEME/LYMPH: No easy bruising, or bleeding gums    T(C): 36.6 (11-21-23 @ 12:58), Max: 36.7 (11-20-23 @ 20:23)  HR: 62 (11-21-23 @ 12:58) (60 - 62)  BP: 166/77 (11-21-23 @ 11:48) (122/65 - 166/77)  RR: 17 (11-21-23 @ 12:58) (17 - 18)  SpO2: 96% (11-21-23 @ 12:58) (94% - 97%)  Wt(kg): --Vital Signs Last 24 Hrs  T(C): 36.6 (21 Nov 2023 12:58), Max: 36.7 (20 Nov 2023 20:23)  T(F): 97.9 (21 Nov 2023 12:58), Max: 98.1 (21 Nov 2023 11:48)  HR: 62 (21 Nov 2023 12:58) (60 - 62)  BP: 166/77 (21 Nov 2023 11:48) (122/65 - 166/77)  BP(mean): --  RR: 17 (21 Nov 2023 12:58) (17 - 18)  SpO2: 96% (21 Nov 2023 12:58) (94% - 97%)    Parameters below as of 21 Nov 2023 12:58  Patient On (Oxygen Delivery Method): room air        MEDICATIONS  (STANDING):  aspirin enteric coated 81 milliGRAM(s) Oral daily  atorvastatin 40 milliGRAM(s) Oral at bedtime  dorzolamide 2% Ophthalmic Solution 1 Drop(s) Both EYES three times a day  enoxaparin Injectable 40 milliGRAM(s) SubCutaneous every 24 hours  fludroCORTISONE 0.1 milliGRAM(s) Oral daily  folic acid 1 milliGRAM(s) Oral daily  furosemide    Tablet 20 milliGRAM(s) Oral daily  glycopyrrolate 1 milliGRAM(s) Oral two times a day  latanoprost 0.005% Ophthalmic Solution 1 Drop(s) Both EYES at bedtime  midodrine. 20 milliGRAM(s) Oral three times a day  nystatin Powder 1 Application(s) Topical every 12 hours  senna 2 Tablet(s) Oral at bedtime  thiamine 100 milliGRAM(s) Oral daily    MEDICATIONS  (PRN):  melatonin 5 milliGRAM(s) Oral at bedtime PRN Insomnia  ondansetron Injectable 4 milliGRAM(s) IV Push every 8 hours PRN Nausea and/or Vomiting      PHYSICAL EXAM:  GENERAL: NAD  EYES: clear conjunctiva  ENMT: Moist mucous membranes  NECK: Supple, No JVD, Normal thyroid  CHEST/LUNG: Clear to auscultation bilaterally; No rales, rhonchi, wheezing, or rubs  HEART: S1, S2, Regular rate and rhythm  ABDOMEN: Soft, Nontender, +distended; Bowel sounds present  NEURO: Alert & Oriented X3  EXTREMITIES: No LE edema, no calf tenderness  LYMPH: No lymphadenopathy noted  SKIN: No rashes or lesions    Consultant(s) Notes Reviewed:  [x ] YES  [ ] NO  Care Discussed with Consultants/Other Providers [ x] YES  [ ] NO    LABS:                        10.4   8.57  )-----------( 150      ( 20 Nov 2023 07:51 )             31.7     11-20    140  |  107  |  14  ----------------------------<  89  3.5   |  31  |  0.96    Ca    8.0<L>      20 Nov 2023 07:51  Phos  2.7     11-20  Mg     1.9     11-20      PT/INR - ( 21 Nov 2023 10:05 )   PT: 14.2 sec;   INR: 1.25 ratio         PTT - ( 21 Nov 2023 10:05 )  PTT:37.9 sec  CAPILLARY BLOOD GLUCOSE      POCT Blood Glucose.: 126 mg/dL (20 Nov 2023 16:27)        Urinalysis Basic - ( 20 Nov 2023 07:51 )    Color: x / Appearance: x / SG: x / pH: x  Gluc: 89 mg/dL / Ketone: x  / Bili: x / Urobili: x   Blood: x / Protein: x / Nitrite: x   Leuk Esterase: x / RBC: x / WBC x   Sq Epi: x / Non Sq Epi: x / Bacteria: x        RADIOLOGY & ADDITIONAL TESTS:  < from: US Abdomen Limited (11.16.23 @ 08:51) >    ACC: [id number] EXAM:  US ABDOMEN LIMITED   ORDERED BY: KORTNEY HERNANDEZ     PROCEDURE DATE:  11/16/2023          INTERPRETATION:  CLINICAL INFORMATION: Evaluate ascites    COMPARISON: None available.    TECHNIQUE: Limited ultrasound of the abdomen to evaluate for ascites    There is a small amount of ascites in the right upper quadrant moderate   amount of ascites in the right lower and left lower quadrant and a   moderate to large amount of ascites in the left upper quadrant      IMPRESSION:  Ascites as discussed above.        --- End of Report ---            PHILLIP ESQUIVEL MD; Attending Radiologist  This document has been electronically signed. Nov 16 2023  8:59AM    < end of copied text >    Imaging Personally Reviewed:  [ ] YES  [ ] NO

## 2023-11-21 NOTE — PROGRESS NOTE ADULT - PROBLEM SELECTOR PLAN 2
hx cirrhosis   last para was in october  abdomen US 11/17 revealed moderate ascites  c/w lasix 20 mg daily, will consider dc after paracentesis  IR consulted for paracentesis hx cirrhosis   last para was in october  abdomen US 11/17 revealed moderate ascites  Fludrocortisone can cause worsening fluid retention  c/w lasix 20 mg daily, will consider dc after paracentesis  IR consulted for paracentesis

## 2023-11-21 NOTE — PROGRESS NOTE ADULT - PROBLEM SELECTOR PLAN 5
pt has chronic dysphagia with slurred speech   seen by speech and swallow specialist this september with recommendation of soft and bite sized with mod thick liquids  CXR Left retrocardiac opacity, new from previous imaging   likely aspiration  s/p Rocephin and azithromycin in ED  aspiration precautions

## 2023-11-21 NOTE — PROGRESS NOTE ADULT - PROBLEM SELECTOR PLAN 1
p/w worsening orthostatic hypotension for 3 days  No Droxidopa in hospital available, daughter will attempt to bring in medication  c/w home midodrine 30mg--> changed 20 mg TID with parameters due to hypertension at rest, 170s sys.   c/w fludrocortisone 0.1 mg daily, since 11/16  still no improvement  will consider dc lasix after paracentesis  Neurology Dr. Black followed p/w worsening orthostatic hypotension for 3 days  No Droxidopa in hospital available, daughter will attempt to bring in medication  c/w home midodrine 30mg--> now lowered to 20 mg TID with parameters due to supine hypertension from fludrocortisone  pt has been on fludrocortisone 0.1 mg daily since 11/16 with no benefit, now increased to 0.1 mg BID.   (Fludrocortisone is associated with hypertension most commonly supine systolic, also hypomag and hypokalemia)  will consider dc lasix after paracentesis  add PPI   monitor BP  monitor Potassium and Magnesium levels  Neurology Dr. Black followed show

## 2023-11-22 NOTE — DIETITIAN INITIAL EVALUATION ADULT - FEEDING ASSISTANCE
Provide food choices within diet Rx as available/updated; Nursing to continue feeding assistance and encouragement, aspiration precaution

## 2023-11-22 NOTE — DIETITIAN INITIAL EVALUATION ADULT - PROBLEM SELECTOR PLAN 3
hx cirrhosis   last para was in october  f/u abdomen US  on furosemide 20mg, hol din setting of dizziness and orthostatic (+)

## 2023-11-22 NOTE — PROGRESS NOTE ADULT - SUBJECTIVE AND OBJECTIVE BOX
Patient is a 83y old  Male who presents with a chief complaint of Pneumonia due to infectious organism     (22 Nov 2023 12:34)      INTERVAL HPI/OVERNIGHT EVENTS: no overnight events    I&O's Summary    21 Nov 2023 07:01  -  22 Nov 2023 07:00  --------------------------------------------------------  IN: 0 mL / OUT: 100 mL / NET: -100 mL    22 Nov 2023 07:01  -  22 Nov 2023 17:09  --------------------------------------------------------  IN: 0 mL / OUT: 400 mL / NET: -400 mL      Vital Signs Last 24 Hrs  T(C): 36.6 (22 Nov 2023 16:37), Max: 36.7 (22 Nov 2023 12:41)  T(F): 97.9 (22 Nov 2023 16:37), Max: 98.1 (22 Nov 2023 12:41)  HR: 63 (22 Nov 2023 16:37) (59 - 65)  BP: 82/52 (22 Nov 2023 16:37) (82/52 - 160/65)  BP(mean): 84 (21 Nov 2023 18:59) (84 - 84)  RR: 17 (22 Nov 2023 16:37) (17 - 18)  SpO2: 95% (22 Nov 2023 16:37) (93% - 98%)    Parameters below as of 22 Nov 2023 16:37  Patient On (Oxygen Delivery Method): room air      PAST MEDICAL & SURGICAL HISTORY:  H/O orthostatic hypotension      CVA (cerebrovascular accident)      Cirrhosis      History of hip surgery          SOCIAL HISTORY  Alcohol:  Tobacco:  Illicit substance use:      FAMILY HISTORY:      LABS:    11-22    141  |  109<H>  |  14  ----------------------------<  86  3.4<L>   |  28  |  0.85    Ca    7.7<L>      22 Nov 2023 05:49  Mg     2.1     11-22      PT/INR - ( 21 Nov 2023 10:05 )   PT: 14.2 sec;   INR: 1.25 ratio         PTT - ( 21 Nov 2023 10:05 )  PTT:37.9 sec  Urinalysis Basic - ( 22 Nov 2023 05:49 )    Color: x / Appearance: x / SG: x / pH: x  Gluc: 86 mg/dL / Ketone: x  / Bili: x / Urobili: x   Blood: x / Protein: x / Nitrite: x   Leuk Esterase: x / RBC: x / WBC x   Sq Epi: x / Non Sq Epi: x / Bacteria: x      CAPILLARY BLOOD GLUCOSE            Urinalysis Basic - ( 22 Nov 2023 05:49 )    Color: x / Appearance: x / SG: x / pH: x  Gluc: 86 mg/dL / Ketone: x  / Bili: x / Urobili: x   Blood: x / Protein: x / Nitrite: x   Leuk Esterase: x / RBC: x / WBC x   Sq Epi: x / Non Sq Epi: x / Bacteria: x        MEDICATIONS  (STANDING):  aspirin enteric coated 81 milliGRAM(s) Oral daily  atorvastatin 40 milliGRAM(s) Oral at bedtime  dorzolamide 2% Ophthalmic Solution 1 Drop(s) Both EYES three times a day  enoxaparin Injectable 40 milliGRAM(s) SubCutaneous every 24 hours  fludroCORTISONE 0.2 milliGRAM(s) Oral every 12 hours  folic acid 1 milliGRAM(s) Oral daily  furosemide    Tablet 20 milliGRAM(s) Oral daily  glycopyrrolate 1 milliGRAM(s) Oral two times a day  latanoprost 0.005% Ophthalmic Solution 1 Drop(s) Both EYES at bedtime  midodrine. 20 milliGRAM(s) Oral three times a day  nystatin Powder 1 Application(s) Topical every 12 hours  pantoprazole    Tablet 40 milliGRAM(s) Oral before breakfast  senna 2 Tablet(s) Oral at bedtime  thiamine 100 milliGRAM(s) Oral daily    MEDICATIONS  (PRN):  melatonin 5 milliGRAM(s) Oral at bedtime PRN Insomnia  ondansetron Injectable 4 milliGRAM(s) IV Push every 8 hours PRN Nausea and/or Vomiting      REVIEW OF SYSTEMS:  CONSTITUTIONAL: No fever, weight loss, or fatigue  EYES: No eye pain, visual disturbances, or discharge  ENMT:  No difficulty hearing, tinnitus, vertigo; No sinus or throat pain  NECK: No pain or stiffness  RESPIRATORY: No cough, wheezing, chills or hemoptysis; No shortness of breath  CARDIOVASCULAR: No chest pain, palpitations, dizziness, or leg swelling  GASTROINTESTINAL: No abdominal or epigastric pain. No nausea, vomiting, or hematemesis; No diarrhea or constipation. No melena or hematochezia.  GENITOURINARY: No dysuria, frequency, hematuria, or incontinence  NEUROLOGICAL: No headaches, memory loss, loss of strength, numbness, or tremors  SKIN: No itching, burning, rashes, or lesions   LYMPH NODES: No enlarged glands  ENDOCRINE: No heat or cold intolerance; No hair loss  MUSCULOSKELETAL: No joint pain or swelling; No muscle, back, or extremity pain  PSYCHIATRIC: No depression, anxiety, mood swings, or difficulty sleeping  HEME/LYMPH: No easy bruising, or bleeding gums  ALLERY AND IMMUNOLOGIC: No hives or eczema    RADIOLOGY & ADDITIONAL TESTS:    Imaging Personally Reviewed:  [ ] YES  [ ] NO    Consultant(s) Notes Reviewed:  [ ] YES  [ ] NO    PHYSICAL EXAM:  GENERAL: NAD, well-groomed, well-developed  HEAD:  Atraumatic, Normocephalic  EYES: EOMI, PERRLA, conjunctiva and sclera clear  ENMT: No tonsillar erythema, exudates, or enlargement; Moist mucous membranes, Good dentition, No lesions  NECK: Supple, No JVD, Normal thyroid  NERVOUS SYSTEM:  Alert & Oriented X3, Good concentration; Motor Strength 5/5 B/L upper and lower extremities; DTRs 2+ intact and symmetric  CHEST/LUNG: Clear to percussion bilaterally; No rales, rhonchi, wheezing, or rubs  HEART: Regular rate and rhythm; No murmurs, rubs, or gallops  ABDOMEN: Soft, Nontender, Nondistended; Bowel sounds present  EXTREMITIES:  2+ Peripheral Pulses, No clubbing, cyanosis, or edema  LYMPH: No lymphadenopathy noted  SKIN: No rashes or lesions    Care Collaborated Discussed with Consultants/Other Providers [ ] YES  [ ] NO Patient is a 83y old  Male who presents with a chief complaint of Pneumonia due to infectious organism     (22 Nov 2023 12:34)      INTERVAL HPI/OVERNIGHT EVENTS: no overnight events    I&O's Summary    21 Nov 2023 07:01  -  22 Nov 2023 07:00  --------------------------------------------------------  IN: 0 mL / OUT: 100 mL / NET: -100 mL    22 Nov 2023 07:01  -  22 Nov 2023 17:09  --------------------------------------------------------  IN: 0 mL / OUT: 400 mL / NET: -400 mL      Vital Signs Last 24 Hrs  T(C): 36.6 (22 Nov 2023 16:37), Max: 36.7 (22 Nov 2023 12:41)  T(F): 97.9 (22 Nov 2023 16:37), Max: 98.1 (22 Nov 2023 12:41)  HR: 63 (22 Nov 2023 16:37) (59 - 65)  BP: 82/52 (22 Nov 2023 16:37) (82/52 - 160/65)  BP(mean): 84 (21 Nov 2023 18:59) (84 - 84)  RR: 17 (22 Nov 2023 16:37) (17 - 18)  SpO2: 95% (22 Nov 2023 16:37) (93% - 98%)    Parameters below as of 22 Nov 2023 16:37  Patient On (Oxygen Delivery Method): room air      PAST MEDICAL & SURGICAL HISTORY:  H/O orthostatic hypotension      CVA (cerebrovascular accident)      Cirrhosis      History of hip surgery          SOCIAL HISTORY  Alcohol:  Tobacco:  Illicit substance use:      FAMILY HISTORY:      LABS:    11-22    141  |  109<H>  |  14  ----------------------------<  86  3.4<L>   |  28  |  0.85    Ca    7.7<L>      22 Nov 2023 05:49  Mg     2.1     11-22      PT/INR - ( 21 Nov 2023 10:05 )   PT: 14.2 sec;   INR: 1.25 ratio         PTT - ( 21 Nov 2023 10:05 )  PTT:37.9 sec  Urinalysis Basic - ( 22 Nov 2023 05:49 )    Color: x / Appearance: x / SG: x / pH: x  Gluc: 86 mg/dL / Ketone: x  / Bili: x / Urobili: x   Blood: x / Protein: x / Nitrite: x   Leuk Esterase: x / RBC: x / WBC x   Sq Epi: x / Non Sq Epi: x / Bacteria: x      CAPILLARY BLOOD GLUCOSE            Urinalysis Basic - ( 22 Nov 2023 05:49 )    Color: x / Appearance: x / SG: x / pH: x  Gluc: 86 mg/dL / Ketone: x  / Bili: x / Urobili: x   Blood: x / Protein: x / Nitrite: x   Leuk Esterase: x / RBC: x / WBC x   Sq Epi: x / Non Sq Epi: x / Bacteria: x        MEDICATIONS  (STANDING):  aspirin enteric coated 81 milliGRAM(s) Oral daily  atorvastatin 40 milliGRAM(s) Oral at bedtime  dorzolamide 2% Ophthalmic Solution 1 Drop(s) Both EYES three times a day  enoxaparin Injectable 40 milliGRAM(s) SubCutaneous every 24 hours  fludroCORTISONE 0.2 milliGRAM(s) Oral every 12 hours  folic acid 1 milliGRAM(s) Oral daily  furosemide    Tablet 20 milliGRAM(s) Oral daily  glycopyrrolate 1 milliGRAM(s) Oral two times a day  latanoprost 0.005% Ophthalmic Solution 1 Drop(s) Both EYES at bedtime  midodrine. 20 milliGRAM(s) Oral three times a day  nystatin Powder 1 Application(s) Topical every 12 hours  pantoprazole    Tablet 40 milliGRAM(s) Oral before breakfast  senna 2 Tablet(s) Oral at bedtime  thiamine 100 milliGRAM(s) Oral daily    MEDICATIONS  (PRN):  melatonin 5 milliGRAM(s) Oral at bedtime PRN Insomnia  ondansetron Injectable 4 milliGRAM(s) IV Push every 8 hours PRN Nausea and/or Vomiting      REVIEW OF SYSTEMS:  CONSTITUTIONAL: No fever  EYES: No eye pain, visual disturbances  ENMT:  No sinus or throat pain  NECK: No pain   RESPIRATORY: No cough; No shortness of breath  CARDIOVASCULAR: No chest pain, palpitations, dizziness, + right lower ext leg swelling  GASTROINTESTINAL: No abdominal pain. No nausea, vomiting,No diarrhea or constipation.  GENITOURINARY: No dysuria, frequency, hematuria  NEUROLOGICAL: No headaches  SKIN: No rashes  MUSCULOSKELETAL: No joint pain     RADIOLOGY & ADDITIONAL TESTS:  ACC: [id number] EXAM:  XR PELVIS AP ONLY 1-2 VIEWS   ORDERED BY:  KENY CHAVARRIA     PROCEDURE DATE:  11/15/2023      INTERPRETATION:  EXAM:XR PELVIS AP ONLY 1 OR 2 VIEWS.     CLINICAL INDICATION: fall .     TECHNIQUE: AP view.     PRIOR EXAM: 02/26/2013.     FINDINGS:     There are a few small densities overlying the right transverse process   of L3, indeterminate but possibly calcified nodes. A left hip prosthesis   is identified. No evidence of a fracture.    IMPRESSION:    No evidence of a fracture.    --- End of Report ---  ACC: [id number] EXAM:  XR CHEST PA LAT 2V   ORDERED BY:  KENY CHAVARRIA     PROCEDURE DATE:  11/15/2023          INTERPRETATION:  EXAM:XR CHEST PA AND LATERAL.     CLINICAL INDICATION: generalized weakness .     TECHNIQUE: AP and lateral views.     PRIOR EXAM: 09/20/2023.     FINDINGS:     Streaky opacities in the left retrocardiac region. Rest of the   visualized lung fields are clear. Magnified cardiac silhouette is stable.   No significant bony abnormality.      IMPRESSION:    Streaky leftretrocardiac opacities that may represent an infiltrate or   atelectasis.    --- End of Report ---      ACC: [id number] EXAM:  CT CERVICAL SPINE   ORDERED BY:  KENY CHAVARRIA     ACC: [id number] EXAM:  CT BRAIN   ORDERED BY:  KENY CHAVARRIA     PROCEDURE DATE:  11/15/2023          INTERPRETATION:  CLINICAL STATEMENT: Closed head injury.    TECHNIQUE: Noncontrast CT of the brain and cervical spine was performed.   Beam hardening artifact from the petrous pyramid limits evaluation of the   brainstem and posterior fossa. KV and MA adjusted according to patient's   body habitus. Sagittal and coronal reformatted images provided. 3-D,   volume rendered imaging of the cervical spine was performed.  COMPARISON: CT brain 11/1/2022.    FINDINGS:    CT BRAIN    Paranasal sinus mucosal thickening with partial opacification right   sphenoid sinus. No air-fluid levels. Bilateral mastoid air cells and   middle ear regions well-aerated. No acute calvarial fracture visualized.    Mild, age-appropriate atrophy with patchy periventricular   hypoattenuation; a nonspecific finding which statistically reflects   chronic microvascular ischemic change, as on the prior.    No evidence of extra-axial collection, intracranial hemorrhage, mass or   mass effect. Gray-white matter differentiation appears preserved.    CT CERVICAL SPINE    No apical lung mass or pneumothorax. No gross intrathecal or paraspinal   mass, however suboptimally evaluated on noncontrast CT. Likewise, no   definitive prevertebral or retropharyngeal fluid collection is evident.   Enlarged, multinodular thyroid with partially imaged left-sided   intrathoracic extension. Calcified plaque left greater than right carotid   bifurcations.    No evidence of prevertebral soft tissue swelling or acute fracture.   Minimal grade 1 retrolisthesis C3 over C4 and grade 1 anterolisthesis C7   over T1. Straightening of lordosis. Atlantodental arthrosis. Multilevel   disc space narrowing with cystic and sclerotic changes in the endplates   at multiple levels. Fusion of the left C2-C3 posterior facet   articulation. Multilevel facet arthrosis, severe in degree at multiple   levels.    Although suboptimally evaluated on this exam, there is evidence of   multilevel cervical disc bulges and/or herniations, impinging upon the   thecal sac, and likely upon the ventral cord, at multiple levels,  contributing to multilevel central canal and neural foramen stenosis with   uncovertebral spurring and facet arthrosis.    IMPRESSION:    CT BRAIN  1. No acute calvarial fracture or intracranial hemorrhage, as on the   prior.  2. Additional findings described in detail above.    CT CERVICAL SPINE  1. No evidence of acute fracture.  2. Straightening of lordosis may reflect muscle spasm. Multilevel   malalignment as above.  3. Additional findings, including those degenerative, described in detail  above.    --- End of Report ---  CC: [id number] EXAM:  US ABDOMEN LIMITED   ORDERED BY: KORTNEY HERNANDEZ     PROCEDURE DATE:  11/16/2023          INTERPRETATION:  CLINICAL INFORMATION: Evaluate ascites    COMPARISON: None available.    TECHNIQUE: Limited ultrasound of the abdomen to evaluate for ascites    There is a small amount of ascites in the right upper quadrant moderate   amount of ascites in the right lower and left lower quadrant and a   moderate to large amount of ascites in the left upper quadrant      IMPRESSION:  Ascites as discussed above.        --- End of Report ---    ACC: [id number] EXAM:  MR ORBITS ONLY WAWIC   ORDERED BY: EASTON BLAKELY     ACC: [id number] EXAM:  MR BRAIN WAW IC   ORDERED BY: EASTON CHOUNET     PROCEDURE DATE:  11/16/2023          INTERPRETATION:  CLINICAL INDICATIONS: Near syncope    COMPARISON: Head CT dated 11/15/2023.    TECHNIQUE: MRI brain and orbits: Multiplanar, multisequence MR imaging of   the brain are obtained with and the administration of 7.5 cc intravenous   Gadavist contrast. 0 cc of contrast was discarded.    FINDINGS:    MRI orbits:  The patient is status post bilateral ocular lens replacement surgery. The   globes are intact. No retrobulbar mass. Extraocular muscles are   symmetric. No abnormal optic nerve enhancement. Optic chiasm is   unremarkable.    MRI BRAIN:  There isno abnormal restricted diffusion to suggest acute infarction.   Scattered periventricular and subcortical white matter T2 /FLAIR   hyperintensities are seen without mass effect, nonspecific, likely   representing mild chronic microvascular changes. Normal T2 flow-voids are   seen within  the intracranial vasculature. The lateral ventricles and   cortical sulci are age-appropriate in size and configuration. There is no   mass, mass effect, or extra-axial fluid collection. 5 mm susceptibility   artifact in left parietal lobe on image 53 of series 8 and may suggest a   small cavernoma.. Midline structures are normal.   Mild inflammatory   mucosal changes are seen throughout the various portions of the paranasal   sinuses. The mastoid air cells are unremarkable.      IMPRESSION: Mild chronic microvascular changes. No acute infarction.   Unremarkable orbits.    --- End of Report ---      TREMAYNE PAREDES MD; Attending Radiologist  This document has been electronically signed. Nov 17 2023  9:27AM      Imaging Personally Reviewed:  [x ] YES  [ ] NO    Consultant(s) Notes Reviewed:  [x ] YES  [ ] NO    PHYSICAL EXAM:  GENERAL: NAD  HEAD:  Atraumatic, Normocephalic  EYES: conjunctiva and sclera clear  ENMT: Moist mucous membranes  NECK: Supple  NERVOUS SYSTEM:  Alert & Oriented X3, Good concentration  CHEST/LUNG: CTA bilaterally; No rales, rhonchi, wheezing  HEART: Regular rate and rhythm  ABDOMEN: Soft, Nontender, Nondistended; Bowel sounds present  EXTREMITIES:  No clubbing, cyanosis, or edema  SKIN: No rashes     Care Collaborated Discussed with Consultants/Other Providers [x] YES  [ ] NO

## 2023-11-22 NOTE — DIETITIAN INITIAL EVALUATION ADULT - PERTINENT LABORATORY DATA
11-22    141  |  109<H>  |  14  ----------------------------<  86  3.4<L>   |  28  |  0.85    Ca    7.7<L>      22 Nov 2023 05:49  Mg     2.1     11-22

## 2023-11-22 NOTE — DIETITIAN INITIAL EVALUATION ADULT - PERTINENT MEDS FT
MEDICATIONS  (STANDING):  aspirin enteric coated 81 milliGRAM(s) Oral daily  atorvastatin 40 milliGRAM(s) Oral at bedtime  dorzolamide 2% Ophthalmic Solution 1 Drop(s) Both EYES three times a day  enoxaparin Injectable 40 milliGRAM(s) SubCutaneous every 24 hours  fludroCORTISONE 0.2 milliGRAM(s) Oral every 12 hours  folic acid 1 milliGRAM(s) Oral daily  furosemide    Tablet 20 milliGRAM(s) Oral daily  glycopyrrolate 1 milliGRAM(s) Oral two times a day  latanoprost 0.005% Ophthalmic Solution 1 Drop(s) Both EYES at bedtime  midodrine. 20 milliGRAM(s) Oral three times a day  nystatin Powder 1 Application(s) Topical every 12 hours  pantoprazole    Tablet 40 milliGRAM(s) Oral before breakfast  senna 2 Tablet(s) Oral at bedtime  thiamine 100 milliGRAM(s) Oral daily    MEDICATIONS  (PRN):  melatonin 5 milliGRAM(s) Oral at bedtime PRN Insomnia  ondansetron Injectable 4 milliGRAM(s) IV Push every 8 hours PRN Nausea and/or Vomiting

## 2023-11-22 NOTE — DIETITIAN INITIAL EVALUATION ADULT - OTHER INFO
Pt lives home with family, HHA PTA, alert, oriented, well-communicated; Reported on regular food in a  at home, unsure recent wt changes, denied GI distress, Unknown food allergies,  no specific food choices; 26-50% intake at time per flowsheet, 25% lunch intake observed today, difficulty to tolerate Soft/Bite sized food, will try Minced and Moist food, extra bottles of Ensure at bedside, able to take Ensure 1 can daily per pt  Pt lives home with family, HHA PTA, alert, oriented, well-communicated; Reported on regular food processed in a  at home, unsure recent wt changes, denied GI distress, Unknown food allergies,  no specific food choices; 26-50% intake at time per flowsheet, 25% lunch intake observed today, difficulty to tolerate Soft/Bite sized food, will try Minced and Moist food, extra bottles of Ensure at bedside, able to take Ensure 1 can daily per pt

## 2023-11-22 NOTE — PROGRESS NOTE ADULT - ASSESSMENT
82 y/o Male  with HTN, HLD, prior strokes, orthostatic hypotension, ETOH abuse, cirrhosis, hx slurred speech and gait instability presenting to the ED with worsening orthostatic hypotension for the past 3 days. Patient has a HHA who measures his BP x3 per day, his SBP would drop from 180 to 80 mmHg he would get associated dizziness, blurry vision, pre-syncope episodes with "almost fall episodes". He was recently seen by his cardiologist for Dr. Margarito Obando who wanted to add Droxidopa with midodrine to sustain blood pressure. His CXR revealed left retrocardiac opacity admitted for aspiration pneumonia and orthostatic hypotension.   Patient completed 5 day course of antibiotics ceftriaxone.     Pt was also started on fludrocortisone since 11/16/23, however pt still has symptomatic orthostatic hypotension. Daughter will plan to bring in home medication droxidopa to see if there will be any improvement.   GOC was completed, pt is DNR/DNI, Molst in chart.   Pt also had a Ultrasound Abd that revealed moderate ascites, IR was consulted for paracentesis. Pt is not reporting any abdominal pain. Pt may benefit from paracentesis due to plan to discontinue lasix to assist in improving orthostatic hypotension. Fludrocortisone now increased to 0.1 mg BID. Pending further improvement     Once optimized, pt is for home PT.        82 y/o Male  with HTN, HLD, prior strokes, orthostatic hypotension, ETOH abuse, cirrhosis, hx slurred speech and gait instability presenting to the ED with worsening orthostatic hypotension for the past 3 days. He was recently seen by his cardiologist for Dr. Margarito Obando who wanted to add Droxidopa with midodrine to sustain blood pressure.     His CXR revealed left retrocardiac opacity admitted for aspiration pneumonia and orthostatic hypotension.   Patient completed 5 day course of antibiotics ceftriaxone.     Pt was also started on fludrocortisone since 11/16/23, however pt still has symptomatic orthostatic hypotension. Daughter will plan to bring in home medication droxidopa on friday 11/24/23.     Pt also had a Ultrasound Abd that revealed moderate ascites, s/p paracentesis 11/20- removed 2L    pt is for home PT.

## 2023-11-22 NOTE — PROGRESS NOTE ADULT - PROBLEM SELECTOR PLAN 2
hx cirrhosis   last para was in october  abdomen US 11/17 revealed moderate ascites  Fludrocortisone can cause worsening fluid retention  c/w lasix 20 mg daily, will consider dc after paracentesis  IR consulted for paracentesis hx cirrhosis   abdomen US 11/17 revealed moderate ascites  s/p paracentesis 11/20/23- 2liters fluid removed

## 2023-11-22 NOTE — DIETITIAN INITIAL EVALUATION ADULT - FACTORS AFF FOOD INTAKE
advanced age with acute on chronic comorbidities, h/o cirrhosis, dementia/difficulty chewing/difficulty swallowing/difficulty with food procurement/preparation

## 2023-11-22 NOTE — PROGRESS NOTE ADULT - PROBLEM SELECTOR PLAN 5
pt has chronic dysphagia with slurred speech   seen by speech and swallow specialist this september with recommendation of soft and bite sized with mod thick liquids  CXR Left retrocardiac opacity, new from previous imaging   likely aspiration  s/p Rocephin and azithromycin in ED  aspiration precautions pt has chronic dysphagia   seen by speech and swallow reccs noted  CXR Left retrocardiac opacity, new from previous imaging   likely aspiration  complete 5 days CFTx  s/p Rocephin and azithromycin in ED  aspiration precautions

## 2023-11-22 NOTE — PROGRESS NOTE ADULT - NS ATTEND AMEND GEN_ALL_CORE FT
I have seen and evaluated the patient seen at bedside. He continues to be quite dismayed by his orthostatic hypotension. No new symptoms. Happy he got his paracentesis yesterday,    I called patient's daughter Viji Prabhakar. She provided unfortunate update that patient will not have droxidopa medication until Friday 11/24.     On exam, patient is sitting upright in the chair in no acute distress. He does seem to have masked facies but otherwise no focal abnormalities on neurological examination. Cardiopulmonary exam without incident. Orthostatic vital signs continue to be profoundly positive with drop in SBP from 170 to 60s systolic (this represents 3+ days in a row), accompanied by severe symptoms. Continues to have significant supine hypertension as well. Last PM BP was 160 systolic.     I have reviewed BMP, ascites studies. Renal function is normal. K only mildly low at 3.4. Will need to watch while on fludrocortisone. Patient did have finding of 473 total nucleated cells in ascites fluid but at 43%, this yields a total of 203, under 250 for diagnostic cutoff for SBP.    Assessment and plan:  81yo M with HTN, HLD, prior strokes, orthostatic hypotension, cirrhosis with recent paracentesis in 10/2023, p/w 2-week history of worsening dizziness on standing with low BP and chronically worsening bilaterally blurry vision. Found to have L sided opacity on CXR, treated for PNA.  At this time, course most concerning for profound orthostatic hypotension that has proved to be very difficult to control. Continuing to titrate medications, now on max dose of midodrine and fludrocortisone and awaiting delivery of droxidopa.     # Pneumonia, likely 2/2 aspiration    - CXR 11/15 with L sided opacity, possibly 2/2 aspiration iso chronic dysphagia as below    - s/p 5-day course of CTX    # Orthostatic hypotension    # blurry vision, bilateral  - chronic in nature, might be worsening iso acute PNA and cirrhosis    - neurology recs appreciated, midodrine dose modified. Patient was instructed on medication intake before standing  - plan at outpatient was to add droxidopa to midodrine. 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 Daughter reports all the workup was negative as the etiology of cirrhosis    - continue home lasix with parameters, may need to hold re: orthostatic hypotension, but family hesitant  - contacting IR for possible inpatient paracentesis this admission     # Hx of stroke    # Chronic dysphagia, gait disturbance    - Found to have stroke in 2022, and it was deemed the cause of his dysphagia, gait disturbance    - Brain MRI only notable for chronic cerebral atrophy   - Continue home aspirin, statin      # DVT ppx    - Lovenox.

## 2023-11-22 NOTE — PROGRESS NOTE ADULT - PROBLEM SELECTOR PLAN 8
hx of chronic strokes  add aspirin 81 mg ec daily  c/w atorvastatin 40 mg daily hx of stroke 2022  MRI noted-Mild chronic microvascular changes. No acute infarction.   add aspirin 81 mg ec daily  c/w atorvastatin 40 mg daily

## 2023-11-22 NOTE — DIETITIAN INITIAL EVALUATION ADULT - PROBLEM SELECTOR PLAN 1
p/w worsening orthostatic hypotension for 3 days  resume home midodrine 30mg TID and   No Droxidopa in hospital available    f/u orthostatic BP  hold furosemide

## 2023-11-22 NOTE — PROGRESS NOTE ADULT - PROBLEM SELECTOR PLAN 1
p/w worsening orthostatic hypotension for 3 days  No Droxidopa in hospital available, daughter will attempt to bring in medication  c/w home midodrine 30mg--> now lowered to 20 mg TID with parameters due to supine hypertension from fludrocortisone  pt has been on fludrocortisone 0.1 mg daily since 11/16 with no benefit, now increased to 0.1 mg BID.   (Fludrocortisone is associated with hypertension most commonly supine systolic, also hypomag and hypokalemia)  will consider dc lasix after paracentesis  add PPI   monitor BP  monitor Potassium and Magnesium levels  Neurology Dr. Black followed p/w worsening orthostatic hypotension   No Droxidopa in hospital available, daughter will attempt to bring in medication 11/24/23  c/w home midodrine 20mg TID and increased fludrocortisone to 0.2mg BID  monitor BP  monitor Potassium and Magnesium levels  Neurology Dr. Black followed

## 2023-11-23 NOTE — PROGRESS NOTE ADULT - SUBJECTIVE AND OBJECTIVE BOX
S: Patient excited to have a Thanksgiving meal with his daughter later in the day   No new symptoms. I asked patient if he felt a little bit better during last evening's orthostatic vital signs when his BP "only" dropped to 90, and he said "honestly, I cannot remember"     O:  Vital Signs Last 24 Hrs  T(C): 36.5 (23 Nov 2023 12:15), Max: 36.7 (22 Nov 2023 19:50)  T(F): 97.7 (23 Nov 2023 12:15), Max: 98.1 (22 Nov 2023 19:50)  HR: 61 (23 Nov 2023 12:15) (59 - 71)  BP: 116/61 (23 Nov 2023 05:15) (82/52 - 170/76)  BP(mean): 79 (23 Nov 2023 05:15) (79 - 79)  RR: 18 (23 Nov 2023 12:15) (17 - 18)  SpO2: 96% (23 Nov 2023 12:15) (91% - 96%)    Parameters below as of 23 Nov 2023 12:15  Patient On (Oxygen Delivery Method): room air        GENERAL: elderly male laying in bed in no acute distress  HEAD:  Atraumatic, Normocephalic  EYES: EOMI, conjunctiva and sclera clear  NECK: Supple, No JVD  CHEST/LUNG: Clear to auscultation bilaterally; No wheeze  HEART: Regular rate and rhythm; No murmurs, rubs, or gallops  ABDOMEN: Soft, Nontender, Nondistended; Bowel sounds present  EXTREMITIES:  2+ Peripheral Pulses, No clubbing, cyanosis, or edema  PSYCH: AAOx3  NEUROLOGY: non-focal  SKIN: No rashes or lesions    aspirin enteric coated 81 milliGRAM(s) Oral daily  atorvastatin 40 milliGRAM(s) Oral at bedtime  dorzolamide 2% Ophthalmic Solution 1 Drop(s) Both EYES three times a day  enoxaparin Injectable 40 milliGRAM(s) SubCutaneous every 24 hours  fludroCORTISONE 0.2 milliGRAM(s) Oral every 12 hours  folic acid 1 milliGRAM(s) Oral daily  furosemide    Tablet 20 milliGRAM(s) Oral daily  glycopyrrolate 1 milliGRAM(s) Oral two times a day  latanoprost 0.005% Ophthalmic Solution 1 Drop(s) Both EYES at bedtime  melatonin 5 milliGRAM(s) Oral at bedtime PRN  midodrine. 20 milliGRAM(s) Oral three times a day  nystatin Powder 1 Application(s) Topical every 12 hours  ondansetron Injectable 4 milliGRAM(s) IV Push every 8 hours PRN  pantoprazole    Tablet 40 milliGRAM(s) Oral before breakfast  senna 2 Tablet(s) Oral at bedtime  thiamine 100 milliGRAM(s) Oral daily          11-23    141  |  108  |  15  ----------------------------<  92  3.4<L>   |  30  |  1.02    Ca    7.7<L>      23 Nov 2023 05:25  Phos  2.6     11-23  Mg     2.2     11-23

## 2023-11-23 NOTE — PROGRESS NOTE ADULT - ASSESSMENT
I have reviewed the patient's orthostatic vital signs. Yesterday PM, SBP declined from 170 -> 123 -> 98 (laying, sitting, standing). This AM, BP declined to 70s systolic when standing    I have reviewed his BMP. K is again mildly low at 3.4.     Assessment and plan:  81yo M with HTN, HLD, prior strokes, orthostatic hypotension, cirrhosis with recent paracentesis in 10/2023, p/w 2-week history of worsening dizziness on standing with low BP and chronically worsening bilaterally blurry vision. Found to have L sided opacity on CXR, treated for PNA.  At this time, course most concerning for profound orthostatic hypotension that has proved to be very difficult to control. Continuing to titrate medications, now on max dose of midodrine and fludrocortisone and awaiting delivery of droxidopa.     # Pneumonia, likely 2/2 aspiration    - CXR 11/15 with L sided opacity, possibly 2/2 aspiration iso chronic dysphagia as below    - s/p 5-day course of CTX    # Orthostatic hypotension    # blurry vision, bilateral  - chronic in nature, might be worsening iso acute PNA and cirrhosis    - neurology recs appreciated, midodrine dose modified. Patient was instructed on medication intake before standing  - plan at outpatient was to add droxidopa to midodrine. 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 Daughter reports all the workup was negative as the etiology of cirrhosis    - contacting IR for possible inpatient paracentesis this admission   -holding Lasix given persistent orthostasis     # Hx of stroke    # Chronic dysphagia, gait disturbance    - Found to have stroke in 2022, and it was deemed the cause of his dysphagia, gait disturbance    - Brain MRI only notable for chronic cerebral atrophy   - Continue home aspirin, statin      # DVT ppx    - Lovenox.

## 2023-11-24 NOTE — PROGRESS NOTE ADULT - PROBLEM SELECTOR PLAN 5
pt has chronic dysphagia   seen by speech and swallow reccs noted  CXR Left retrocardiac opacity, new from previous imaging   likely aspiration  complete 5 days CFTx  s/p Rocephin and azithromycin in ED  aspiration precautions

## 2023-11-24 NOTE — PROGRESS NOTE ADULT - PROBLEM SELECTOR PLAN 2
hx cirrhosis   abdomen US 11/17 revealed moderate ascites  s/p paracentesis 11/20/23- 2liters fluid removed  asymptomatic

## 2023-11-24 NOTE — PROGRESS NOTE ADULT - PROBLEM SELECTOR PLAN 8
hx of stroke 2022  MRI noted-Mild chronic microvascular changes. No acute infarction.   add aspirin 81 mg ec daily  c/w atorvastatin 40 mg daily

## 2023-11-24 NOTE — PROGRESS NOTE ADULT - SUBJECTIVE AND OBJECTIVE BOX
Patient is a 83y old  Male who presents with a chief complaint of orthostatic hypotension & gait instability (24 Nov 2023 11:22)    OVERNIGHT EVENTS: no acute changes.     Pt is aox3, sitting up in chair today, comfortable appearing, eating well.    REVIEW OF SYSTEMS:  CONSTITUTIONAL: No fever, chills  ENMT:  No difficulty hearing, no change in vision  NECK: No pain or stiffness  RESPIRATORY: No cough, SOB  CARDIOVASCULAR: No chest pain, palpitations  GASTROINTESTINAL: No abdominal pain. No nausea, vomiting, or diarrhea  GENITOURINARY: No dysuria  NEUROLOGICAL: +dizziness   SKIN: No itching, burning, rashes, or lesions   LYMPH NODES: No enlarged glands  ENDOCRINE: No heat or cold intolerance; No hair loss  MUSCULOSKELETAL: No joint pain or swelling; No muscle, back, or extremity pain  PSYCHIATRIC: No depression, anxiety  HEME/LYMPH: No easy bruising, or bleeding gums    T(C): 36.4 (11-24-23 @ 14:54), Max: 36.7 (11-23-23 @ 20:32)  HR: 61 (11-24-23 @ 14:54) (58 - 88)  BP: 173/70 (11-24-23 @ 14:54) (115/55 - 186/73)  RR: 16 (11-24-23 @ 14:54) (16 - 18)  SpO2: 99% (11-24-23 @ 14:54) (97% - 99%)  Wt(kg): --Vital Signs Last 24 Hrs  T(C): 36.4 (24 Nov 2023 14:54), Max: 36.7 (23 Nov 2023 20:32)  T(F): 97.5 (24 Nov 2023 14:54), Max: 98 (23 Nov 2023 20:32)  HR: 61 (24 Nov 2023 14:54) (58 - 88)  BP: 173/70 (24 Nov 2023 14:54) (115/55 - 186/73)  BP(mean): --  RR: 16 (24 Nov 2023 14:54) (16 - 18)  SpO2: 99% (24 Nov 2023 14:54) (97% - 99%)    Parameters below as of 24 Nov 2023 14:54  Patient On (Oxygen Delivery Method): room air        MEDICATIONS  (STANDING):  aspirin enteric coated 81 milliGRAM(s) Oral daily  atorvastatin 40 milliGRAM(s) Oral at bedtime  dorzolamide 2% Ophthalmic Solution 1 Drop(s) Both EYES three times a day  droxidopa 100 milliGRAM(s) Oral every 8 hours  enoxaparin Injectable 40 milliGRAM(s) SubCutaneous every 24 hours  folic acid 1 milliGRAM(s) Oral daily  glycopyrrolate 1 milliGRAM(s) Oral two times a day  latanoprost 0.005% Ophthalmic Solution 1 Drop(s) Both EYES at bedtime  midodrine. 20 milliGRAM(s) Oral three times a day  nystatin Powder 1 Application(s) Topical every 12 hours  pantoprazole    Tablet 40 milliGRAM(s) Oral before breakfast  senna 2 Tablet(s) Oral at bedtime  thiamine 100 milliGRAM(s) Oral daily    MEDICATIONS  (PRN):  melatonin 5 milliGRAM(s) Oral at bedtime PRN Insomnia  ondansetron Injectable 4 milliGRAM(s) IV Push every 8 hours PRN Nausea and/or Vomiting      PHYSICAL EXAM:  GENERAL: NAD  EYES: clear conjunctiva  ENMT: Moist mucous membranes  NECK: Supple, No JVD, Normal thyroid  CHEST/LUNG: Clear to auscultation bilaterally; No rales, rhonchi, wheezing, or rubs  HEART: S1, S2, Regular rate and rhythm  ABDOMEN: Soft, Nontender, +distended; Bowel sounds present  NEURO: Alert & Oriented X3  EXTREMITIES: No LE edema, no calf tenderness  LYMPH: No lymphadenopathy noted  SKIN: No rashes or lesions    Consultant(s) Notes Reviewed:  [x ] YES  [ ] NO  Care Discussed with Consultants/Other Providers [ x] YES  [ ] NO    LABS:                        10.6   4.52  )-----------( 132      ( 24 Nov 2023 05:52 )             32.9     11-24    142  |  110<H>  |  16  ----------------------------<  91  3.4<L>   |  30  |  0.96    Ca    7.7<L>      24 Nov 2023 05:52  Phos  2.6     11-23  Mg     2.2     11-23        CAPILLARY BLOOD GLUCOSE            Urinalysis Basic - ( 24 Nov 2023 05:52 )    Color: x / Appearance: x / SG: x / pH: x  Gluc: 91 mg/dL / Ketone: x  / Bili: x / Urobili: x   Blood: x / Protein: x / Nitrite: x   Leuk Esterase: x / RBC: x / WBC x   Sq Epi: x / Non Sq Epi: x / Bacteria: x        RADIOLOGY & ADDITIONAL TESTS:      Imaging Personally Reviewed:  [ ] YES  [ ] NO

## 2023-11-24 NOTE — PROGRESS NOTE ADULT - ASSESSMENT
82 y/o Male  with HTN, HLD, prior strokes, orthostatic hypotension, ETOH abuse, cirrhosis, hx slurred speech and gait instability presenting to the ED with worsening orthostatic hypotension for the past 3 days. He was recently seen by his cardiologist for Dr. Margarito Obando who wanted to add Droxidopa with midodrine to sustain blood pressure.     His CXR revealed left retrocardiac opacity admitted for aspiration pneumonia and orthostatic hypotension.   Patient completed 5 day course of antibiotics ceftriaxone.   Pt also had a Ultrasound Abd that revealed moderate ascites 2/2 Liver Cirrhosis, s/p paracentesis 11/20 and removed 2L.     Pt was also started on fludrocortisone since 11/16/23, however pt still has symptomatic orthostatic hypotension despite increased dosage.   Daughter was able to bring home medication droxidopa, now started on 11/24/23.   Palliative Care was consulted for worsening orthostatic hypotension.     Awaiting further improvement of Orthostatic hypotension.   Once optimized pt is for home physical therapy.

## 2023-11-24 NOTE — PROGRESS NOTE ADULT - PROBLEM SELECTOR PLAN 1
p/w worsening orthostatic hypotension   GOC DNR/DNI, DEBBIE in chart   No Droxidopa in hospital available  daughter brought in medication droxidopa on 11/24/23  start home med droxidopa 100 mg q8h   c/w home midodrine 20mg TID  dc fludrocortisone on 11/24/23   monitor BP  Palliative Care Dr. Rocha consulted, apprec recs   Neurology Dr. Black followed p/w worsening orthostatic hypotension   GOC DNR/DNI, DEBBIE in chart   No Droxidopa in hospital available  daughter brought in medication droxidopa on 11/24/23  start home med droxidopa 100 mg q8h (Elevating the head of the bed lessens the risk of supine hypertension, and blood pressure should be measured in this position)  c/w home midodrine 20mg TID  dc fludrocortisone on 11/24/23   monitor BP  Palliative Care Dr. Rocha consulted, apprec recs   Neurology Dr. Black followed

## 2023-11-24 NOTE — PROGRESS NOTE ADULT - NS ATTEND AMEND GEN_ALL_CORE FT
I have seen and evaluated patient at bedside. He has no new symptoms. Was very happy to have his [employer] yesterday    On exam, he is sitting in the chair in no acute distress. No abdominal distension. No LE edema. No murmurs    I have reviewed the patient's orthostatic vital signs. Yesterday PM, SBP declined from 164 ->120s -> 120s (laying, sitting, standing). This AM, BP declined to 62 systolic when standing    I have reviewed his BMP. K is again mildly low at 3.4, will replete. This is likely an adverse effect of the mineralocorticoid activity of the fludrocortisone medication used to combat his orthostatic hypotension     Assessment and plan:  81yo M with HTN, HLD, prior strokes, orthostatic hypotension, cirrhosis with recent paracentesis in 10/2023, p/w 2-week history of worsening dizziness on standing with low BP and chronically worsening bilaterally blurry vision. Found to have L sided opacity on CXR, treated for PNA.  At this time, course most concerning for profound orthostatic hypotension that has proved to be very difficult to control. Continuing to titrate medications, now on max dose of midodrine and fludrocortisone and awaiting delivery of droxidopa.     # Orthostatic hypotension, severe and not improving   # blurry vision, bilateral  - chronic in nature, might be worsening iso acute PNA and cirrhosis    - neurology recs appreciated, midodrine dose modified. Patient was instructed on medication intake before standing  - plan at outpatient was to add droxidopa to midodrine. Droxidopa not available in house, now on fludrocortisone  -increase frequency of fludrocortisone to 0.1 mg BID on 11/21, can increase to 0.2 mg BID (increased on 11/22); will need to replete K on fludrocortisone   - PT recs appreciated, home PT recommended   -patient may wind up needing palliative consult if this is not improved; will likely have to tolerate a degree of significant hypertension for patient to have an acceptable quality of life  -stop Lasix 11/23  -daughter brining in droxidopa medication on 11/24 to trial to max out his therapy     # Pneumonia, likely 2/2 aspiration, resolved    - CXR 11/15 with L sided opacity, possibly 2/2 aspiration iso chronic dysphagia as below    - s/p 5-day course of CTX    # Cirrhosis, 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 Daughter reports all the workup was negative as the etiology of cirrhosis    -completed paracentesis on 11/22  -holding Lasix given persistent orthostasis     # Hx of stroke    # Chronic dysphagia, gait disturbance    - Found to have stroke in 2022, and it was deemed the cause of his dysphagia, gait disturbance    - Brain MRI only notable for chronic cerebral atrophy   - Continue home aspirin, statin      # DVT ppx    - Lovenox.

## 2023-11-25 NOTE — PROGRESS NOTE ADULT - SUBJECTIVE AND OBJECTIVE BOX
S: Patient says he feels tired but is otherwise doing well and doing his best to keep his spirits up     O:  Vital Signs Last 24 Hrs  T(C): 36.8 (25 Nov 2023 14:23), Max: 36.8 (24 Nov 2023 20:39)  T(F): 98.2 (25 Nov 2023 14:23), Max: 98.3 (24 Nov 2023 20:39)  HR: 60 (25 Nov 2023 14:23) (60 - 77)  BP: 127/57 (25 Nov 2023 05:50) (94/52 - 127/57)  BP(mean): --  RR: 18 (25 Nov 2023 14:23) (16 - 18)  SpO2: 98% (25 Nov 2023 14:23) (96% - 98%)    Parameters below as of 25 Nov 2023 14:23  Patient On (Oxygen Delivery Method): room air        GENERAL: frail elderly male resting in bed, no distress  HEAD:  Atraumatic, Normocephalic  EYES: EOMI, conjunctiva and sclera clear  NECK: Supple, No JVD  CHEST/LUNG: Clear to auscultation bilaterally; No wheeze  HEART: Regular rate and rhythm  ABDOMEN: Soft, Nontender, Nondistended  EXTREMITIES:  1+ pitting edema of the feet and ankles  PSYCH: calm and interactive   NEUROLOGY: non-focal  SKIN: No rashes or lesions    aspirin enteric coated 81 milliGRAM(s) Oral daily  atorvastatin 40 milliGRAM(s) Oral at bedtime  dorzolamide 2% Ophthalmic Solution 1 Drop(s) Both EYES three times a day  droxidopa 100 milliGRAM(s) Oral every 8 hours  enoxaparin Injectable 40 milliGRAM(s) SubCutaneous every 24 hours  fludroCORTISONE 0.2 milliGRAM(s) Oral daily  folic acid 1 milliGRAM(s) Oral daily  glycopyrrolate 1 milliGRAM(s) Oral two times a day  latanoprost 0.005% Ophthalmic Solution 1 Drop(s) Both EYES at bedtime  melatonin 5 milliGRAM(s) Oral at bedtime PRN  midodrine. 20 milliGRAM(s) Oral three times a day  nystatin Powder 1 Application(s) Topical every 12 hours  ondansetron Injectable 4 milliGRAM(s) IV Push every 8 hours PRN  pantoprazole    Tablet 40 milliGRAM(s) Oral before breakfast  senna 2 Tablet(s) Oral at bedtime  thiamine 100 milliGRAM(s) Oral daily                            10.6   4.52  )-----------( 132      ( 24 Nov 2023 05:52 )             32.9       11-24    142  |  110<H>  |  16  ----------------------------<  91  3.4<L>   |  30  |  0.96    Ca    7.7<L>      24 Nov 2023 05:52

## 2023-11-25 NOTE — PROGRESS NOTE ADULT - ASSESSMENT
I have reviewed the patient's orthostatic vital signs. Yesterday PM, SBP declined from 202 -> 157 -> 103 (laying, sitting, standing). This AM, BP declined 127 -> 92- > 54    No labs to review     Assessment and plan:  83yo M with HTN, HLD, prior strokes, orthostatic hypotension, cirrhosis with recent paracentesis in 10/2023, p/w 2-week history of worsening dizziness on standing with low BP and chronically worsening bilaterally blurry vision. Found to have L sided opacity on CXR, treated for PNA.  At this time, course most concerning for profound orthostatic hypotension that has proved to be very difficult to control. Continuing to titrate medications, now on max dose of midodrine and fludrocortisone. Has started droxidopa with first day of 100 mg every 8 hours on 11/25, will assess response but continue to have guarded expectation     # Orthostatic hypotension, severe and not improving   # Supine Hypertension  - chronic in nature, might be worsening iso acute PNA and cirrhosis    - neurology recs appreciated, midodrine dose modified. Patient was instructed on medication intake before standing  - plan at outpatient was to add droxidopa to midodrine. Droxidopa not available in house, delay in starting therapy until PM of 11/24  -continue pharmacotherapy management: midodrine 20 mg TID, fludrocortisone 0.2 mg every AM, droxidopa 100 mg Q8 HR; can titrate to droxidopa to 600 mg total daily dose   - PT recs appreciated, home PT recommended   -patient may wind up needing palliative consult if this is not improved; will likely have to tolerate a degree of significant hypertension for patient to have an acceptable quality of life  -stop Lasix 11/23  -daughter brining in droxidopa medication on 11/24 to trial to max out his therapy     # Pneumonia, likely 2/2 aspiration, resolved    - CXR 11/15 with L sided opacity, possibly 2/2 aspiration iso chronic dysphagia as below    - s/p 5-day course of CTX    # Cirrhosis, 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 Daughter reports all the workup was negative as the etiology of cirrhosis    -completed paracentesis on 11/22  -holding Lasix given persistent orthostasis     # Hx of stroke    # Chronic dysphagia, gait disturbance    - Found to have stroke in 2022, and it was deemed the cause of his dysphagia, gait disturbance    - Brain MRI only notable for chronic cerebral atrophy   - Continue home aspirin, statin      # DVT ppx    - Lovenox.

## 2023-11-26 NOTE — PROGRESS NOTE ADULT - SUBJECTIVE AND OBJECTIVE BOX
S: No new symptoms. Asking "has there been any improvement." Shared with him the difficult news about his continued severe orthostatic hypotension. Counseled patient that his symptoms are more important than a target BP number, but he still feels "wobbly" when he stands     O:  Vital Signs Last 24 Hrs  T(C): 36.7 (26 Nov 2023 12:03), Max: 36.8 (26 Nov 2023 04:44)  T(F): 98.1 (26 Nov 2023 12:03), Max: 98.2 (26 Nov 2023 04:44)  HR: 61 (26 Nov 2023 12:03) (61 - 61)  BP: 132/63 (26 Nov 2023 04:44) (132/63 - 132/63)  BP(mean): --  RR: 18 (26 Nov 2023 12:03) (16 - 18)  SpO2: 96% (26 Nov 2023 12:03) (95% - 96%)    Parameters below as of 26 Nov 2023 12:03  Patient On (Oxygen Delivery Method): room air        GENERAL: elderly male sitting in bedside chair eating mashed potatoes   HEAD:  Atraumatic, Normocephalic  EYES: EOMI, PERRLA, conjunctiva and sclera clear  NECK: Supple, No JVD  CHEST/LUNG: Clear to auscultation bilaterally; No wheeze  HEART: Regular rate and rhythm; No murmurs, rubs, or gallops  ABDOMEN: Soft, Nontender, Nondistended; Bowel sounds present  EXTREMITIES:  2+ Peripheral Pulses,  progressive edema of feet and ankles noted  PSYCH: oriented to name and what is going on  NEUROLOGY: non-focal  SKIN: No rashes or lesions    aspirin enteric coated 81 milliGRAM(s) Oral daily  atorvastatin 40 milliGRAM(s) Oral at bedtime  dorzolamide 2% Ophthalmic Solution 1 Drop(s) Both EYES three times a day  droxidopa 200 milliGRAM(s) Oral three times a day  enoxaparin Injectable 40 milliGRAM(s) SubCutaneous every 24 hours  fludroCORTISONE 0.2 milliGRAM(s) Oral daily  folic acid 1 milliGRAM(s) Oral daily  glycopyrrolate 1 milliGRAM(s) Oral two times a day  latanoprost 0.005% Ophthalmic Solution 1 Drop(s) Both EYES at bedtime  melatonin 5 milliGRAM(s) Oral at bedtime PRN  midodrine. 20 milliGRAM(s) Oral three times a day  nystatin Powder 1 Application(s) Topical every 12 hours  ondansetron Injectable 4 milliGRAM(s) IV Push every 8 hours PRN  pantoprazole    Tablet 40 milliGRAM(s) Oral before breakfast  potassium chloride    Tablet ER 20 milliEquivalent(s) Oral once  senna 2 Tablet(s) Oral at bedtime  thiamine 100 milliGRAM(s) Oral daily

## 2023-11-26 NOTE — CHART NOTE - NSCHARTNOTEFT_GEN_A_CORE
I met with patient, cayden Lee at bedside. We had a nice discussion about nonpharmacologic measures for orthostatic BP. Patient's legs wrapped.    He stool up and BP immediately taken - systolic declined to 107 mm Hg. Waited 2 minutes with running timer, BP checked again, mid-90s systolic. Patient was agreeable to try walking.    With walker, patient was able to walk all the way down the roblero, turn around with a fairly wide radius, and return to his room. His BP was then in the 120s systolic.     Encouraged patient that he should DC on 11/27 as his medical therapy is maxed out now.

## 2023-11-26 NOTE — PROGRESS NOTE ADULT - ASSESSMENT
I have reviewed the patient's orthostatic vital signs. Yesterday PM, SBP declined from 202 -> 157 -> 103 (laying, sitting, standing). This AM, BP declined 127 -> 92- > 54    No labs to review     Assessment and plan:  81yo M with HTN, HLD, prior strokes, orthostatic hypotension, cirrhosis with recent paracentesis in 10/2023, p/w 2-week history of worsening dizziness on standing with low BP and chronically worsening bilaterally blurry vision. Found to have L sided opacity on CXR, treated for PNA.  At this time, course most concerning for profound orthostatic hypotension that has proved to be very difficult to control. Continuing to titrate medications, now on max dose of midodrine and fludrocortisone. Has started droxidopa with first day of 100 mg every 8 hours on 11/25, will assess response but continue to have guarded expectation. Does increased to 200 mg TID on 11/26. Will either DC on 11/27 or involve palliative care if family continues to struggle with notion that his quality of life severely impacted by this condition    # Orthostatic hypotension, severe and not improving   # Supine Hypertension  - chronic in nature, might be worsening iso acute PNA and cirrhosis    - neurology recs appreciated, midodrine dose modified. Patient was instructed on medication intake before standing  - plan at outpatient was to add droxidopa to midodrine. Droxidopa not available in house, delay in starting therapy until PM of 11/24  -continue pharmacotherapy management: midodrine 20 mg TID, fludrocortisone 0.2 mg every AM, droxidopa 200 mg TID  - PT recs appreciated, home PT recommended   -patient may wind up needing palliative consult if this is not improved; will likely have to tolerate a degree of significant hypertension for patient to have an acceptable quality of life  -stop Lasix 11/23  -daughter brining in droxidopa medication on 11/24 to trial to max out his therapy     # Pneumonia, likely 2/2 aspiration, resolved    - CXR 11/15 with L sided opacity, possibly 2/2 aspiration iso chronic dysphagia as below    - s/p 5-day course of CTX    # Cirrhosis, 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 Daughter reports all the workup was negative as the etiology of cirrhosis    -completed paracentesis on 11/22  -holding Lasix given persistent orthostasis     # Hx of stroke    # Chronic dysphagia, gait disturbance    - Found to have stroke in 2022, and it was deemed the cause of his dysphagia, gait disturbance    - Brain MRI only notable for chronic cerebral atrophy   - Continue home aspirin, statin      # DVT ppx    - Lovenox.

## 2023-11-27 NOTE — PROGRESS NOTE ADULT - NS ATTEND AMEND GEN_ALL_CORE FT
I have reviewed the patient's orthostatic vital signs. Laying -> sitting -> standing he went from 114 systolic to 128 systolic to 60/39.     He says that he feels very fatigued. Asking "please do not send me home until I feel better." No new symptoms    On exam, he is reclining at 45 degrees in NAD. Appears tired but comfortably. Moderate abdominal distension and 1+ pedal edema extending to the lower shins noted. No other changes on exam.     I have reviewed his BMP, K is low at 3.3, will replete. Renal function is normal.     I have requested consultation by Dr. Ramon Rocha of Palliative Care and discussed this case at length with him.     Assessment and plan:  83yo M with HTN, HLD, prior strokes, orthostatic hypotension, cirrhosis with recent paracentesis in 10/2023, p/w 2-week history of worsening dizziness on standing with low BP and chronically worsening bilaterally blurry vision with presentation concerning for Lewy Body dementia and dysautonomia. Found to have L sided opacity on CXR, treated for PNA.  At this time, course most concerning for profound orthostatic hypotension that has proved to be very difficult to control. Continuing to titrate medications, now on max dose of three agents with midodrine, fludrocortisone and droxidopa. Given inability to control symptoms and DC patient, involving Palliative Care on 11/27 to assist with conversations around disease trajectory.    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 Patient was instructed on medication intake before standing. Now on 20 TID  - plan at outpatient was to add droxidopa to midodrine. 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 Daughter reports all the workup was negative as the etiology of cirrhosis    -completed paracentesis on 11/22  -holding Lasix given persistent orthostasis, but LE edema worsening     # Hx of stroke    # Chronic dysphagia, gait disturbance    - Found to have stroke in 2022, and it was deemed the cause of his dysphagia, gait disturbance    - Brain MRI only notable for chronic cerebral atrophy   - Continue home aspirin, statin      # DVT ppx    - Lovenox.

## 2023-11-27 NOTE — CONSULT NOTE ADULT - CONVERSATION DETAILS
Face to face family meeting held with patient and daughter Viji at bedside x 46 minutes to discuss prognosis, ACP, goals of care, and treatment preferences.  Hospitalist Dr. Gutierrez also joined in the conversation.  Daughter states that patient has been evaluated for dementia with progressive decline over the last 2 years, although she states that a specific diagnosis of Lewy Body dementia  (or any other dementia type) was never confirmed.  She also reports patient having chronic dysphagia with significant oropharyngeal secretions (although secretions are much better controlled with oral glycopyrrolate BID).  Discussed events of current hospitalization and that we have reached an endpoint in being able to effectively treat patient's orthostatic hypotension and related symptoms (without putting him at undue risk of additional strokes). Discussed that patient's worsening cirrhosis (now with recurrent ascites) may also be playing a role.  Anticipatory guidance given regarding expected disease trajectory; counseled patient and daughter that despite all treatments, he likely will continue to have difficulty walking and ADL limitations 2/2 orthostasis and start to require significantly increased amount of support at home.  Also counseled them that orthostatic symptoms may remain difficult to control as his dementia progresses.  Gently suggested that best way to take care of patient going forward may be to transition more towards a comfort oriented approach; patient is clear that if he going to be mostly confined to bed, he would just rather be at home.  Briefly explored hospice philosophy and services; counseled patient and daughter that due to the combination of his Lewy Body dementia, worsening orthostasis, worsening cirrhosis, and protein-calorie malnutrition, he is likely eligible for hospice with a general prognosis in the range of 6-12 months.  Daughter in agreement for home hospice eval; also open to referral to home visiting MD program, will refer to [facility] Care team SW for additional support.    Discussion then turned to advance directives.  Discussed risks/benefits/burdens of allowing natural death vs full CPR.  Patient able to demonstrate understanding of consequences of not performing CPR, remains very cleat that he would not want to be sustained by artificial means.  Patient and daughter in agreement to maintain prior orders for DNR and DNI.  Patient is also clear that in the event of worsening dysphagia, he would not want a feeding tube.  MOLST form updated (see below).  Patient and daughter were appreciative of the conversation, and all of their questions were answered.

## 2023-11-27 NOTE — CONSULT NOTE ADULT - CONSULT REASON
Orthostatic hypotension
Complex medical decision making in the context of recalcitrant orthostatic hypotension, cirrhosis, and dementia

## 2023-11-27 NOTE — CONSULT NOTE ADULT - PROBLEM SELECTOR RECOMMENDATION 9
Patient with advanced cirrhosis, now with recurrent ascites requiring frequent paracentesis, and worsening hypoalbuminemia.  Not a candidate for diuretics due to his chronic orthostatic hypotension.  In addition, cirrhosis likely to further exacerbate patient's orthostasis and lead to additional debility and functional decline over the next 6-12 months, including increased risk of reinfection, falls resulting in injury, recurrent hospitalizations, encephalopathy, and death.  Given his chronic/intractable orthostasis, chronic dysphagia with risk for ongoing aspiration, expected decline 2/2 Lewy Body dementia, and emerging protein-calorie malnutrition, in combination with his advanced cirrhosis, patient appears to be hospice appropriate with a general prognosis in the range of 6-12 months.    See [facility] discussion above--patient/family in agreement with home hospice eval  Continue supportive care

## 2023-11-27 NOTE — PROGRESS NOTE ADULT - ASSESSMENT
84 y/o Male  with HTN, HLD, prior strokes, orthostatic hypotension, ETOH abuse, cirrhosis, hx slurred speech and gait instability presenting to the ED with worsening orthostatic hypotension for the past 3 days. He was recently seen by his cardiologist for Dr. Margarito Obando who wanted to add Droxidopa with midodrine to sustain blood pressure.     His CXR revealed left retrocardiac opacity admitted for aspiration pneumonia and orthostatic hypotension.   Patient completed 5 day course of antibiotics ceftriaxone.   Pt also had a Ultrasound Abd that revealed moderate ascites 2/2 Liver Cirrhosis, s/p paracentesis 11/20 and removed 2L.     Pt was also started on fludrocortisone since 11/16/23, however pt still has symptomatic orthostatic hypotension despite increased dosage.   Daughter was able to bring home medication droxidopa, now started on 11/24/23. Dose increased  Palliative Care was consulted for worsening orthostatic hypotension.     Awaiting further improvement of Orthostatic hypotension.   Once optimized pt is for home physical therapy.

## 2023-11-27 NOTE — PROGRESS NOTE ADULT - PROBLEM SELECTOR PLAN 1
p/w worsening orthostatic hypotension   GOC DNR/DNI, DBEBIE in chart   No Droxidopa in hospital available  daughter brought in medication droxidopa on 11/24/23  Increase home med droxidopa 200 mg q8h (Elevating the head of the bed lessens the risk of supine hypertension, and blood pressure should be measured in this position)  c/w home midodrine 20mg TID  dc fludrocortisone on 11/24/23   c/w ace bandage   Palliative Care Dr. Rocha consulted, apprec recs   Neurology Dr. Black followed

## 2023-11-27 NOTE — CONSULT NOTE ADULT - PROBLEM SELECTOR RECOMMENDATION 7
As above.  84 yo male with multiple medical comorbidities, including refractory orthostasis related to Lewy Body dementia, prior CVA, chronic dysphagia, and advanced cirrhosis.  Given his chronic/intractable orthostasis, chronic dysphagia with risk for ongoing aspiration, expected decline 2/2 Lewy Body dementia, and emerging protein-calorie malnutrition, in combination with his advanced cirrhosis, patient appears to be hospice appropriate with a general prognosis in the range of 6-12 months.    See [facility] discussion above--disease trajectory reviewed with patient and daughter in detail, anticipatory guidance given.  In agreement with home hospice eval/referral, also would benefit from home visiting MD    Otherwise continue management as per primary medical team  Ongoing collaboration with Pall Care team SW  MOLST orders in place with DNR/DNI/no PEG  Discussed with medical team and Dr. Gutierrez in detail  Palliative Care team will continue to follow

## 2023-11-27 NOTE — PROGRESS NOTE ADULT - TIME BILLING
-two bedside visits with patient  -checking orthostatic vital signs personally  -walking patient  -counseling grandson and providing education  -physical exam  -documenting the encounter  -clinical decision making and medication titration
I counseled the patient and primary team about the medications to maintain for management of orthostatic hypotension.
I counseled the patient, daughter at bedside, and primary team about the medication adjustments indicated to help manage the patient's orthostatic hypotension.
-chart review  -phone call and discussion with outpatient cardiologist Dr. Obando  -bedside evaluation  -History and PE  -family meeting with patient and daughter Viji Prabhakar  -documenting the encounter  -med adjustment
-chart review  -bedside assessment, history and physical exam  -discussing case with Dr. Rocha of Palliative Care  -meeting with patient and daughter   -clinical decision making  -documenting the encounter
yes

## 2023-11-27 NOTE — CONSULT NOTE ADULT - PROBLEM SELECTOR RECOMMENDATION 2
Likely neurogenic in context of Lewy Body dementia, aggravated by cirrhosis.  Remains grossly symptomatic despite maximal doses of multiple meds, therapy becoming limited by supine HTN    Continue midodrine, fludrocortisone, and droxidopa, with further med titration as per primary medical team  ? Consider neurology input for additional suggestions in regards to symptom management (dizziness)

## 2023-11-27 NOTE — CONSULT NOTE ADULT - PROBLEM SELECTOR RECOMMENDATION 6
Clinical evidence indicates that the patient has Severe protein calorie malnutrition/ 3rd degree    In context of     Acute Illness/Injury (>7days)    vs    Chronic Illness (>1 month)--worsening cirrhosis combined with progression of underlying Lewy Body dementia with worsening dysphagia, as evidenced    Energy/Food intake <50% of estimated energy requirement >5 days  Weight loss: Moderate - severe (patient reports 30-40 lbs weight loss over last several months)  Body Fat loss: Severe   (slightly cachectic with mild to moderate temporal wasting)  Muscle mass loss: Severe    albumin 1.7  Fluid Accumulation: Severe (Fluid overload with recurrent ascites, + peripheral edema)   Strength: weakened severe (bedbound)    Recommend:   pleasure feeds with mince and moist diet w/ mildly thickend liquids as tolerated - aspiration precautions, careful hand-feeding, teaching to caregivers  Dietary consult appreciated, on supplementation with Two Oleg HN daily    See GOC discussion above--patient does not want a feeding tube

## 2023-11-27 NOTE — CONSULT NOTE ADULT - SUBJECTIVE AND OBJECTIVE BOX
Consult to: Discuss complex medical decision making related to goals of care    [facility] Geriatric and Palliative Consult Service:  Gloria Kelsi DO: cell ([phone])  Ramon Rocha MD: cell ([phone])  Rigoberto Merchant NP: cell ([phone])   Colby Marsh LMSW: cell ([phone])   Anahi Alexandre NP: via ipadio Teams    Can contact any Palliative Team member via ipadio Teams for consults and questions      HPI:  This is an 84 y/o Male  with HTN, HLD, prior strokes, orthostatics hypotension, ETOH abuse, cirrhosis, hx slurred speech and gait instability presenting to the ED with worsening orthostatic hypotension for the past 3 days. Patient has a HHA who measures his BP x3 per day, his SBP would drop from 180 to 80 mmHg he would get associated dizziness, blurry vision, pre-syncope episodes with "almost fall episodes". Patient used to ambulate with a cane and now uses a walker for balance. Patient was recently seen by his cardiologist for Dr. Margarito Obando who wanted to add Droxidopa with midodrine to sustain blood pressure, however patient never received medication pending insurance authorization. Patient's neurologist is Dr. Shaka Up who referred him to neuro-ophthalmology evaluation for blurry vision, patient had a full work up and was prescribed eye glasses however still endorses vison problems. Patient was admitted back in November 2022 for gait instability and slurred speech, he had an MRI showed chronic appearing infarcts/ neuro called for evaluation.  MRI brain 10/5/22: FLAIR changes c/w small vessel disease.  multiple chronic tiny bilateral cerebellar infarcts. small left frontal DVA; chronic post fossa subdural hygroma 6mm . Per neurology  Strokes are chronic, no acute strokes on MRI --> B/L cerebellar can contribute to unsteady gait    weakness/dysphagia and mental status changes over the last year likely 2/2 neurodegenerative d/o such as Multi system atrophy likle Lewy Body dementia. Patient had a speech and swallow evaluation this September, recommendations were soft and bite sized with mildly thickened liquids.     In ED  vitals: BP: 130/80, HR: 69, T: 36.6   s/p Azithromycin and Rocephin   1lNS, 40meq KCL   CTH, CT- C spine no acute pathology   CXR left retrocardiac opacity  (15 Nov 2023 19:28)      PAST MEDICAL & SURGICAL HISTORY:  H/O orthostatic hypotension      CVA (cerebrovascular accident)      Cirrhosis      History of hip surgery          SOCIAL HISTORY:    Admitted from:  home  (with HHA)           assisted living          [facility]   [ none ] Substance abuse, [ none ] Tobacco hx, [ none ] Alcohol hx, [ none ] Home Opioid Hx    FAMILY HISTORY:   unable to obtain from patient due to poor mentation, family unable to give information, see H&P for history  Baseline ADLs (prior to admission):    Allergies    No Known Allergies    Intolerances      Present Symptoms: Mild, Moderate, Severe  Pain:             Location -                               Aggravating factors -             Quality -             Radiation -             Timing-             Severity (0-10 scale):             Minimal acceptable level (0-10 scale):  Fatigue:  Nausea:  Lack of Appetite:   SOB:  Depression:  Anxiety:  Review of Systems: [All others negative or Unable to obtain due to poor mentation]    CPOT:    https://ccs-st.org/resources/Documents/Sedation%20Analgesia%20Delirium%20in%20CC/CCS%20STH%20Guideline%20template%20CPOT.pdf  PAIN AD Score:   [email or web address].[email or web address].pdf (press ctrl +  left click to view)      MEDICATIONS  (STANDING):  aspirin enteric coated 81 milliGRAM(s) Oral daily  atorvastatin 40 milliGRAM(s) Oral at bedtime  dorzolamide 2% Ophthalmic Solution 1 Drop(s) Both EYES three times a day  droxidopa 200 milliGRAM(s) Oral three times a day  enoxaparin Injectable 40 milliGRAM(s) SubCutaneous every 24 hours  fludroCORTISONE 0.2 milliGRAM(s) Oral daily  folic acid 1 milliGRAM(s) Oral daily  glycopyrrolate 1 milliGRAM(s) Oral two times a day  latanoprost 0.005% Ophthalmic Solution 1 Drop(s) Both EYES at bedtime  midodrine. 20 milliGRAM(s) Oral three times a day  nystatin Powder 1 Application(s) Topical every 12 hours  pantoprazole    Tablet 40 milliGRAM(s) Oral before breakfast  potassium chloride    Tablet ER 20 milliEquivalent(s) Oral once  senna 2 Tablet(s) Oral at bedtime  thiamine 100 milliGRAM(s) Oral daily    MEDICATIONS  (PRN):  melatonin 5 milliGRAM(s) Oral at bedtime PRN Insomnia  ondansetron Injectable 4 milliGRAM(s) IV Push every 8 hours PRN Nausea and/or Vomiting      PHYSICAL EXAM:  Vital Signs Last 24 Hrs  T(C): 36.6 (27 Nov 2023 20:14), Max: 36.7 (27 Nov 2023 05:05)  T(F): 97.8 (27 Nov 2023 20:14), Max: 98.1 (27 Nov 2023 05:05)  HR: 66 (27 Nov 2023 20:14) (61 - 73)  BP: 219/92 (27 Nov 2023 20:14) (103/73 - 219/92)  BP(mean): --  RR: 18 (27 Nov 2023 20:14) (18 - 18)  SpO2: 98% (27 Nov 2023 20:14) (95% - 100%)    Parameters below as of 27 Nov 2023 20:14  Patient On (Oxygen Delivery Method): room air        General: alert  oriented x ____    lethargic distressed cachexia  nonverbal  unarousable verbal    Palliative Performance Scale/Karnofsky Score:  http://npcrc.org/files/news/palliative_performance_scale_ppsv2.pdf    HEENT: no abnormal lesion, dry mouth  ET tube/trach oral lesions:  Lungs: tachypnea/labored breathing, audible excessive secretions  CV: RRR, S1S2, tachycardia  GI: soft non distended non tender  incontinent               PEG/NG/OG tube  constipation  last BM:   : incontinent  oliguria/anuria  salinas  Musculoskeletal: weakness x4 edema x4    ambulatory with assistance   mostly/fully bedbound/wheelchair bound  Skin: no abnormal skin lesions, poor skin turgor, pressure ulcer stage:   Neuro: no deficits, mild cognitive impairment dsyphagia/dysarthria paresis  Oral intake ability: unable/only mouth care, minimal moderate full capability    LABS:    11-27    143  |  111<H>  |  14  ----------------------------<  97  3.3<L>   |  27  |  0.94    Ca    7.6<L>      27 Nov 2023 06:00      Urinalysis Basic - ( 27 Nov 2023 06:00 )    Color: x / Appearance: x / SG: x / pH: x  Gluc: 97 mg/dL / Ketone: x  / Bili: x / Urobili: x   Blood: x / Protein: x / Nitrite: x   Leuk Esterase: x / RBC: x / WBC x   Sq Epi: x / Non Sq Epi: x / Bacteria: x        RADIOLOGY & ADDITIONAL STUDIES:     Consult to: Discuss complex medical decision making related to goals of care    [facility] Geriatric and Palliative Consult Service:  Gloriaraissa Dolan DO: cell ([phone])  Ramon Rocha MD: cell ([phone])  Rigoberto Merchant NP: cell ([phone])   Colby Marsh LMSW: cell ([phone])   Anahi Alexandre NP: via [employer] Teams    Can contact any Palliative Team member via [employer] Teams for consults and questions      HPI:  This is an 82 y/o Male  with HTN, HLD, prior strokes, orthostatics hypotension, ETOH abuse, cirrhosis (with prior outpatient paracentesis in Oct. 2022), hx slurred speech and gait instability presenting to the ED with worsening orthostatic hypotension for the past 3 days. Patient has a HHA who measures his BP x3 per day, his SBP would drop from 180 to 80 mmHg he would get associated dizziness, blurry vision, pre-syncope episodes with "almost fall episodes". Patient used to ambulate with a cane and now uses a walker for balance. Patient was recently seen by his cardiologist for Dr. Margarito Obando who wanted to add Droxidopa with midodrine to sustain blood pressure, however patient never received medication pending insurance authorization. Patient's neurologist is Dr. Shaka Up who referred him to neuro-ophthalmology evaluation for blurry vision, patient had a full work up and was prescribed eye glasses however still endorses vison problems. Patient was admitted back in November 2022 for gait instability and slurred speech, he had an MRI showed chronic appearing infarcts/ neuro called for evaluation.  MRI brain 10/5/22: FLAIR changes c/w small vessel disease.  multiple chronic tiny bilateral cerebellar infarcts. small left frontal CVA; chronic post fossa subdural hygroma 6mm . Per neurology  Strokes are chronic, no acute strokes on MRI --> B/L cerebellar can contribute to unsteady gait weakness/dysphagia and mental status changes over the last year likely 2/2 neurodegenerative d/o such as Multi system atrophy likely Lewy Body dementia. Patient had a speech and swallow evaluation this September, recommendations were soft and bite sized with mildly thickened liquids.     In ED  vitals: BP: 130/80, HR: 69, T: 36.6   s/p Azithromycin and Rocephin   1lNS, 40meq KCL   CTH, CT- C spine no acute pathology   CXR left retrocardiac opacity  (15 Nov 2023 19:28)    Patient was admitted for orthostatic hypotension, presumed aspiration pneumonia in the setting of chronic dysphagia, and cirrhosis.  Received IV azithromycin x 1 and treated with 5 day course of IV Rocephin.  Abdominal sonogram (11/16) demonstrated moderate to large ascites in all 4 quadrants, Underwent repeat paracentesis via IR 11/21, 2000 ml serous ascites drained, cultures negative.  Patient with continued symptomatic orthostasis despite titration to max dose of 3 medications (midodrine 20 mg TID + fludrocortisone 0.2 mg daily + droxydopa 200 mg TID), with little improvement in symptoms, now complicated by frequent BP lability and supine HTN.  Seen by Neurology, who opined patient to have "neurogenic orthostatic hypotension, short-term memory deficit, and persistent bilateral blurry vision in the setting of likely Lewy body dementia without behavioral disturbance."  PT note appreciated, recommended for home PT.  No official SLP note appreciated, but per initial Dietitian eval on 11/22, patient reported difficultly tolerating soft and bite sized diet, changed to minced and moist diet.    Patient examined earlier this afternoon.  Alert and oriented x 3, responsive, occ mild confusion noted.  Denies pain, SOB, nausea, or vomiting.  Continues to be bothered by frequent severe dizziness whenever he tries to get up, also reports increasing drowsiness.  Frustrated by not being able to walk w/o having severe symptoms.  Otherwise has no acute complaints.      PAST MEDICAL & SURGICAL HISTORY:  H/O orthostatic hypotension      CVA (cerebrovascular accident)      Cirrhosis      History of hip surgery          SOCIAL HISTORY:    Admitted from:  home  (lives with wife who has dementia, has shared HHA 6 hrs x 7 days)  [ none ] Substance abuse, [ none ] Tobacco hx, [ +++ ] prior Alcohol hx, [ none ] Home Opioid Hx    FAMILY HISTORY:  Unable to obtain from patient due to poor mentation, family unable to give information, see H&P for history  Baseline ADLs (prior to admission):  Ambulates with walker, but with frequent gait instability and falls, uses wheelchair on occasion, occ functional urinary incontinence, able to feed self, otherwise mostly independent in ADLs (but now being limited by increased orthostasis/dizziness).    Allergies    No Known Allergies    Intolerances      Present Symptoms: Mild, Moderate, Severe  Pain:  Denies  0/10  Fatigue: moderate  Nausea:  denies  Lack of Appetite:  mild to moderate   SOB:  denies  Depression:  denies dayami depression, but frustrated by loss of functioning 2/2 orthostasis symptoms  Anxiety:  Denies  Constipation:  Denies  Review of Systems:   All other systems reviewed and are negative      MEDICATIONS  (STANDING):  aspirin enteric coated 81 milliGRAM(s) Oral daily  atorvastatin 40 milliGRAM(s) Oral at bedtime  dorzolamide 2% Ophthalmic Solution 1 Drop(s) Both EYES three times a day  droxidopa 200 milliGRAM(s) Oral three times a day  enoxaparin Injectable 40 milliGRAM(s) SubCutaneous every 24 hours  fludroCORTISONE 0.2 milliGRAM(s) Oral daily  folic acid 1 milliGRAM(s) Oral daily  glycopyrrolate 1 milliGRAM(s) Oral two times a day  latanoprost 0.005% Ophthalmic Solution 1 Drop(s) Both EYES at bedtime  midodrine. 20 milliGRAM(s) Oral three times a day  nystatin Powder 1 Application(s) Topical every 12 hours  pantoprazole    Tablet 40 milliGRAM(s) Oral before breakfast  potassium chloride    Tablet ER 20 milliEquivalent(s) Oral once  senna 2 Tablet(s) Oral at bedtime  thiamine 100 milliGRAM(s) Oral daily    MEDICATIONS  (PRN):  melatonin 5 milliGRAM(s) Oral at bedtime PRN Insomnia  ondansetron Injectable 4 milliGRAM(s) IV Push every 8 hours PRN Nausea and/or Vomiting      PHYSICAL EXAM:  Vital Signs Last 24 Hrs  T(C): 36.6 (27 Nov 2023 20:14), Max: 36.7 (27 Nov 2023 05:05)  T(F): 97.8 (27 Nov 2023 20:14), Max: 98.1 (27 Nov 2023 05:05)  HR: 66 (27 Nov 2023 20:14) (61 - 73)  BP: 219/92 (27 Nov 2023 20:14) (103/73 - 219/92)  BP(mean): --  RR: 18 (27 Nov 2023 20:14) (18 - 18)  SpO2: 98% (27 Nov 2023 20:14) (95% - 100%)    Parameters below as of 27 Nov 2023 20:14  Patient On (Oxygen Delivery Method): room air        General: alert  oriented x _3___     sl cachectic with moderate temporal wasting, NAD    Palliative Performance Scale/Karnofsky Score:  40%  http://npcrc.org/files/news/palliative_performance_scale_ppsv2.pdf    HEENT:  EOMI anicteric, pharynx clear, MM moist  Lungs:  clear to auscultation, respirations unlabored, occasional congestion/secretions noted  CV: RRR,  normal S1 and S2, no murmur  GI: soft non distended non tender  + normal bowel sounds  : occ urinary incontinence  Musculoskeletal: mild weakness x4 in all extremities, mostly bedbound (++ continued orthostasis when ambulating with walker), tr edema of LE bilaterally   Skin: no abnormal skin lesions or DU noted  Neuro: no focal deficits, + mild cognitive impairment, + dysphagia  Oral intake ability:  moderate capability, on minced and moist diet with mildly thickened liquids      LABS:    11-27    143  |  111<H>  |  14  ----------------------------<  97  3.3<L>   |  27  |  0.94    Ca    7.6<L>      27 Nov 2023 06:00    Albumin: 1.7 g/dL (11.16.23 @ 05:05)        Urinalysis Basic - ( 27 Nov 2023 06:00 )    Color: x / Appearance: x / SG: x / pH: x  Gluc: 97 mg/dL / Ketone: x  / Bili: x / Urobili: x   Blood: x / Protein: x / Nitrite: x   Leuk Esterase: x / RBC: x / WBC x   Sq Epi: x / Non Sq Epi: x / Bacteria: x        RADIOLOGY & ADDITIONAL STUDIES:    ACC: [id number] EXAM:  XR CHEST PA LAT 2V   ORDERED BY:  KENY CHAVARRIA     PROCEDURE DATE:  11/15/2023          INTERPRETATION:  EXAM:XR CHEST PA AND LATERAL.     CLINICAL INDICATION: generalized weakness .     TECHNIQUE: AP and lateral views.     PRIOR EXAM: 09/20/2023.     FINDINGS:     Streaky opacities in the left retrocardiac region. Rest of the   visualized lung fields are clear. Magnified cardiac silhouette is stable.   No significant bony abnormality.      IMPRESSION:    Streaky leftretrocardiac opacities that may represent an infiltrate or   atelectasis.    --- End of Report ---    ACC: [id number] EXAM:  CT CERVICAL SPINE   ORDERED BY:  KENY CHAVARRIA     ACC: [id number] EXAM:  CT BRAIN   ORDERED BY:  KENY CHAVARRIA     PROCEDURE DATE:  11/15/2023          INTERPRETATION:  CLINICAL STATEMENT: Closed head injury.    TECHNIQUE: Noncontrast CT of the brain and cervical spine was performed.   Beam hardening artifact from the petrous pyramid limits evaluation of the   brainstem and posterior fossa. KV and MA adjusted according to patient's   body habitus. Sagittal and coronal reformatted images provided. 3-D,   volume rendered imaging of the cervical spine was performed.  COMPARISON: CT brain 11/1/2022.    FINDINGS:    CT BRAIN    Paranasal sinus mucosal thickening with partial opacification right   sphenoid sinus. No air-fluid levels. Bilateral mastoid air cells and   middle ear regions well-aerated. No acute calvarial fracture visualized.    Mild, age-appropriate atrophy with patchy periventricular   hypoattenuation; a nonspecific finding which statistically reflects   chronic microvascular ischemic change, as on the prior.    No evidence of extra-axial collection, intracranial hemorrhage, mass or   mass effect. Gray-white matter differentiation appears preserved.    CT CERVICAL SPINE    No apical lung mass or pneumothorax. No gross intrathecal or paraspinal   mass, however suboptimally evaluated on noncontrast CT. Likewise, no   definitive prevertebral or retropharyngeal fluid collection is evident.   Enlarged, multinodular thyroid with partially imaged left-sided   intrathoracic extension. Calcified plaque left greater than right carotid   bifurcations.    No evidence of prevertebral soft tissue swelling or acute fracture.   Minimal grade 1 retrolisthesis C3 over C4 and grade 1 anterolisthesis C7   over T1. Straightening of lordosis. Atlantodental arthrosis. Multilevel   disc space narrowing with cystic and sclerotic changes in the endplates   at multiple levels. Fusion of the left C2-C3 posterior facet   articulation. Multilevel facet arthrosis, severe in degree at multiple   levels.    Although suboptimally evaluated on this exam, there is evidence of   multilevel cervical disc bulges and/or herniations, impinging upon the   thecal sac, and likely upon the ventral cord, at multiple levels,  contributing to multilevel central canal and neural foramen stenosis with   uncovertebral spurring and facet arthrosis.    IMPRESSION:    CT BRAIN  1. No acute calvarial fracture or intracranial hemorrhage, as on the   prior.  2. Additional findings described in detail above.    CT CERVICAL SPINE  1. No evidence of acute fracture.  2. Straightening of lordosis may reflect muscle spasm. Multilevel   malalignment as above.  3. Additional findings, including those degenerative, described in detail  above.    --- End of Report ---        ACC: [id number] EXAM:  MR ORBITS ONLY WAWIC   ORDERED BY: EASTON BLAKELY     ACC: [id number] EXAM:  MR BRAIN WAW IC   ORDERED BY: EASTON BLAKELY     PROCEDURE DATE:  11/16/2023          INTERPRETATION:  CLINICAL INDICATIONS: Near syncope    COMPARISON: Head CT dated 11/15/2023.    TECHNIQUE: MRI brain and orbits: Multiplanar, multisequence MR imaging of   the brain are obtained with and the administration of 7.5 cc intravenous   Gadavist contrast. 0 cc of contrast was discarded.    FINDINGS:    MRI orbits:  The patient is status post bilateral ocular lens replacement surgery. The   globes are intact. No retrobulbar mass. Extraocular muscles are   symmetric. No abnormal optic nerve enhancement. Optic chiasm is   unremarkable.    MRI BRAIN:  There isno abnormal restricted diffusion to suggest acute infarction.   Scattered periventricular and subcortical white matter T2 /FLAIR   hyperintensities are seen without mass effect, nonspecific, likely   representing mild chronic microvascular changes. Normal T2 flow-voids are   seen within  the intracranial vasculature. The lateral ventricles and   cortical sulci are age-appropriate in size and configuration. There is no   mass, mass effect, or extra-axial fluid collection. 5 mm susceptibility   artifact in left parietal lobe on image 53 of series 8 and may suggest a   small cavernoma.. Midline structures are normal.   Mild inflammatory   mucosal changes are seen throughout the various portions of the paranasal   sinuses. The mastoid air cells are unremarkable.      IMPRESSION: Mild chronic microvascular changes. No acute infarction.   Unremarkable orbits.    --- End of Report ---    ACC: [id number] EXAM:  US ABDOMEN LIMITED   ORDERED BY: KORTNEY HERNANDEZ     PROCEDURE DATE:  11/16/2023          INTERPRETATION:  CLINICAL INFORMATION: Evaluate ascites    COMPARISON: None available.    TECHNIQUE: Limited ultrasound of the abdomen to evaluate for ascites    There is a small amount of ascites in the right upper quadrant moderate   amount of ascites in the right lower and left lower quadrant and a   moderate to large amount of ascites in the left upper quadrant      IMPRESSION:  Ascites as discussed above.    --- End of Report ---

## 2023-11-27 NOTE — CONSULT NOTE ADULT - PROBLEM SELECTOR RECOMMENDATION 3
As above, prior Neurology notes appreciated.  Also with possible component of vascular dementia given history of CVA and prior CT/MRI findings.  Continue supportive care

## 2023-11-27 NOTE — CONSULT NOTE ADULT - PROBLEM SELECTOR RECOMMENDATION 5
Daughter reports chronic dysphagia, slowly progressive over last 2 years, with mild to moderate oropharyngeal dysphagia confirmed by recent outpatient MBS in late Sept. 2023, also with prominent secretions, on long term glycopyrrolate    Having difficulty tolerating soft and bite sized diet, recently downgraded to mince and moist, with thickened liquids  Continue oral glycopyrrolate 1 mg BID  ? consider SLP reeval

## 2023-11-28 NOTE — PROGRESS NOTE ADULT - NS ATTEND AMEND GEN_ALL_CORE FT
Patient seen at bedside, standing up with assistance of PTs. BP remarkably low to 59/44 with dizziness, put back in bed. Denies any pain.  VS reviewed, BP remarkably fluctuating, sBP up to 210s at rest.  On exam, patient is AOx3, NAD, cardiopulmonary exams unremarkable, abdomen soft, NT/ND, extremities with mild edema.   Labs reviewed, CBC, BMP with normal Cr.    Assessment and plan:  83yo M with HTN, HLD, prior strokes, orthostatic hypotension, cirrhosis with recent paracentesis in 10/2023, p/w 2-week history of worsening dizziness on standing with low BP and chronically worsening bilaterally blurry vision with presentation concerning for Lewy Body dementia and dysautonomia. Found to have L sided opacity on CXR, treated for PNA.  At this time, course most concerning for profound orthostatic hypotension that has proved to be very difficult to control. Continuing to titrate medications, now on max dose of midodrine and droxidopa, however continues to have episodes of severe orthostatic hypotension with dizziness. Given inability to control symptoms and DC patient, involving Palliative Care on 11/27 to assist with conversations around disease trajectory.    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    - neurology recs appreciated  - patient was previously on three agent including fludrocortisone for the orthostatic hypotension, but it was discontinued given the ineffectiveness and high BP at rest combined with hypokalemia  - BP persistently high at rest, will decrease droxidopa to 100mg TID  - PT recs appreciated, home PT recommended   - appreciate palliative care input 11/27, pall care SW involved, appreciate the assistance with dispo plan  - Lasix discontinued on 11/23 given the persistent orthostatic hypotension    # Pneumonia, likely 2/2 aspiration, resolved    - CXR 11/15 with L sided opacity, possibly 2/2 aspiration iso chronic dysphagia as below    - s/p 5-day course of CTX    # Cirrhosis, 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 Daughter reports all the workup was negative as the etiology of cirrhosis    -completed paracentesis on 11/22    # Hx of stroke    # Chronic dysphagia, gait disturbance    - Found to have stroke in 2022, and it was deemed the cause of his dysphagia, gait disturbance    - Brain MRI only notable for chronic cerebral atrophy   - Continue home aspirin, statin      # DVT ppx    - Lovenox

## 2023-11-28 NOTE — PROGRESS NOTE ADULT - PROBLEM SELECTOR PLAN 5
pt has chronic dysphagia   seen by speech and swallow reccs noted  CXR Left retrocardiac opacity, new from previous imaging   likely aspiration  complete 5 days CFTx  s/p Rocephin and azithromycin in ED  aspiration precautions  MBS order d/elsi

## 2023-11-28 NOTE — PROGRESS NOTE ADULT - PROBLEM SELECTOR PROBLEM 10
Telephone Encounter by Deedee Trujillo RN at 03/08/18 02:03 PM     Author:  Deedee Trujillo RN Service:  (none) Author Type:  Registered Nurse     Filed:  03/08/18 02:28 PM Encounter Date:  3/8/2018 Status:  Signed     :  Deedee Trujillo RN (Registered Nurse)            Jimena from Dr Dreyer's office calling to have patient worked in. Patient has a left displaced distal radius fracture    I offered appointment with Dr Melvin Kebede for 3/12 at 3:00pm.  I asked that patient arrive 15 minutes early and advised to have patient bring the [facility] xray disk. Jimena accepted appointment, voiced understanding.  booked. I also advised Jimena, per Liliya in the business office, a referral would not be needed for patient to have an appointment with Orthopedics, however, due to patient's insurance, since Orthopedics is a specialty department, a prior authorization may be needed. Jimena voiced understanding and said that she will look into it. [JB1.1M]  Close encounter.[JB1.1T]          Revision History        User Key Date/Time User Provider Type Action    > JB1.1 03/08/18 02:28 PM Deedee Trujillo RN Registered Nurse Sign    M - Manual, T - Template Glaucoma

## 2023-11-28 NOTE — PROGRESS NOTE ADULT - PROBLEM SELECTOR PLAN 1
p/w worsening orthostatic hypotension   GOC DNR/DNI, DEBBIE in chart   No Droxidopa in hospital available  Daughter brought in medication droxidopa on 11/24/23  Home med droxidopa 100mg q8h (Elevating the head of the bed lessens the risk of supine hypertension, and blood pressure should be measured in this position)  c/w home midodrine 20mg TID  dc fludrocortisone on 11/24/23   c/w ace bandage   Palliative Care Dr. Rocha consulted, apprec recs   Neurology Dr. Black followed

## 2023-11-28 NOTE — PROGRESS NOTE ADULT - ASSESSMENT
84 y/o Male  with HTN, HLD, prior strokes, orthostatic hypotension, ETOH abuse, cirrhosis, hx slurred speech and gait instability presenting to the ED with worsening orthostatic hypotension for the past 3 days. He was recently seen by his cardiologist for Dr. Margarito Obando who wanted to add Droxidopa with midodrine to sustain blood pressure.     His CXR revealed left retrocardiac opacity admitted for aspiration pneumonia and orthostatic hypotension.   Patient completed 5 day course of antibiotics ceftriaxone.   Pt also had a Ultrasound Abd that revealed moderate ascites 2/2 Liver Cirrhosis, s/p paracentesis 11/20 and removed 2L.     Pt was also started on fludrocortisone since 11/16/23, however pt still has symptomatic orthostatic hypotension despite increased dosage.   Daughter was able to bring home medication droxidopa, now started on 11/24/23. Dose increased  Palliative Care was consulted for worsening orthostatic hypotension.     Awaiting further improvement of Orthostatic hypotension.   Once optimized pt is for home physical therapy.     11/28- Pt with labile BP up to 200's/90's while on 3 agent for orthostatic hypotension.  In light of his elevated BP, correct home dose Droxidopa 100mg TID confirmed with daughter and reinstated.  Ordered for MBS d/elsi after discussion with daughter Viji King at .  Per daughter, pt had MBS done at OSH and does not wish to have study repeated and that her father would not want any aggressive measures such as feeding tubes in the event he is not able to swallow.

## 2023-11-28 NOTE — PROGRESS NOTE ADULT - SUBJECTIVE AND OBJECTIVE BOX
NP Note discussed with  Primary Attending    Patient is a 83y old  Male who presents with a chief complaint of orthostatic hypotension & gait instability (27 Nov 2023 15:57)      INTERVAL HPI/OVERNIGHT EVENTS: no new complaints    MEDICATIONS  (STANDING):  aspirin enteric coated 81 milliGRAM(s) Oral daily  atorvastatin 40 milliGRAM(s) Oral at bedtime  dorzolamide 2% Ophthalmic Solution 1 Drop(s) Both EYES three times a day  droxidopa 200 milliGRAM(s) Oral three times a day  enoxaparin Injectable 40 milliGRAM(s) SubCutaneous every 24 hours  fludroCORTISONE 0.2 milliGRAM(s) Oral daily  folic acid 1 milliGRAM(s) Oral daily  glycopyrrolate 1 milliGRAM(s) Oral two times a day  latanoprost 0.005% Ophthalmic Solution 1 Drop(s) Both EYES at bedtime  midodrine. 20 milliGRAM(s) Oral three times a day  nystatin Powder 1 Application(s) Topical every 12 hours  pantoprazole    Tablet 40 milliGRAM(s) Oral before breakfast  senna 2 Tablet(s) Oral at bedtime  thiamine 100 milliGRAM(s) Oral daily    MEDICATIONS  (PRN):  melatonin 5 milliGRAM(s) Oral at bedtime PRN Insomnia  ondansetron Injectable 4 milliGRAM(s) IV Push every 8 hours PRN Nausea and/or Vomiting      __________________________________________________  REVIEW OF SYSTEMS:    CONSTITUTIONAL: No fever,   EYES: no acute visual disturbances  NECK: No pain or stiffness  RESPIRATORY: No cough; No shortness of breath  CARDIOVASCULAR: No chest pain, no palpitations  GASTROINTESTINAL: No pain. No nausea or vomiting; No diarrhea   NEUROLOGICAL: No headache or numbness, no tremors  MUSCULOSKELETAL: No joint pain, no muscle pain  GENITOURINARY: no dysuria, no frequency, no hesitancy  PSYCHIATRY: no depression , no anxiety  ALL OTHER  ROS negative        Vital Signs Last 24 Hrs  T(C): 36.6 (28 Nov 2023 05:00), Max: 36.6 (27 Nov 2023 20:14)  T(F): 97.8 (28 Nov 2023 05:00), Max: 97.8 (27 Nov 2023 20:14)  HR: 61 (28 Nov 2023 05:00) (61 - 73)  BP: 219/92 (27 Nov 2023 20:14) (103/73 - 219/92)  BP(mean): --  RR: 20 (28 Nov 2023 05:00) (18 - 20)  SpO2: 95% (28 Nov 2023 05:00) (95% - 100%)    Parameters below as of 28 Nov 2023 05:00  Patient On (Oxygen Delivery Method): room air        ________________________________________________  PHYSICAL EXAM:  GENERAL: NAD  HEENT: Normocephalic;  conjunctivae and sclerae clear; moist mucous membranes;   NECK : supple  CHEST/LUNG: Clear to auscultation bilaterally with good air entry   HEART: S1 S2  regular; no murmurs, gallops or rubs  ABDOMEN: Soft, Nontender, Nondistended; Bowel sounds present  EXTREMITIES: no cyanosis; no edema; no calf tenderness  SKIN: warm and dry; no rash  NERVOUS SYSTEM:  Awake and alert; Oriented  to place, person and time ; no new deficits    _________________________________________________  LABS:                        10.3   6.20  )-----------( 162      ( 28 Nov 2023 06:25 )             32.2     11-28    142  |  111<H>  |  14  ----------------------------<  91  3.6   |  29  |  0.93    Ca    7.8<L>      28 Nov 2023 06:25        Urinalysis Basic - ( 28 Nov 2023 06:25 )    Color: x / Appearance: x / SG: x / pH: x  Gluc: 91 mg/dL / Ketone: x  / Bili: x / Urobili: x   Blood: x / Protein: x / Nitrite: x   Leuk Esterase: x / RBC: x / WBC x   Sq Epi: x / Non Sq Epi: x / Bacteria: x      CAPILLARY BLOOD GLUCOSE            RADIOLOGY & ADDITIONAL TESTS:    Imaging  Reviewed:  YES/NO    Consultant(s) Notes Reviewed:   YES/ No      Plan of care was discussed with patient and /or primary care giver; all questions and concerns were addressed  NP Note discussed with Primary Attending    Patient is a 83y old  Male who presents with a chief complaint of orthostatic hypotension & gait instability (28 Nov 2023 10:25AM)      INTERVAL HPI/OVERNIGHT EVENTS: Reports feeling drowsy, ongoing for 5 months per pt. Denies HA, no nausea or vomiting, no cp or sob.     MEDICATIONS  (STANDING):  aspirin enteric coated 81 milliGRAM(s) Oral daily  atorvastatin 40 milliGRAM(s) Oral at bedtime  dorzolamide 2% Ophthalmic Solution 1 Drop(s) Both EYES three times a day  droxidopa 200 milliGRAM(s) Oral three times a day  enoxaparin Injectable 40 milliGRAM(s) SubCutaneous every 24 hours  fludroCORTISONE 0.2 milliGRAM(s) Oral daily  folic acid 1 milliGRAM(s) Oral daily  glycopyrrolate 1 milliGRAM(s) Oral two times a day  latanoprost 0.005% Ophthalmic Solution 1 Drop(s) Both EYES at bedtime  midodrine. 20 milliGRAM(s) Oral three times a day  nystatin Powder 1 Application(s) Topical every 12 hours  pantoprazole    Tablet 40 milliGRAM(s) Oral before breakfast  senna 2 Tablet(s) Oral at bedtime  thiamine 100 milliGRAM(s) Oral daily    MEDICATIONS  (PRN):  melatonin 5 milliGRAM(s) Oral at bedtime PRN Insomnia  ondansetron Injectable 4 milliGRAM(s) IV Push every 8 hours PRN Nausea and/or Vomiting      __________________________________________________  REVIEW OF SYSTEMS:    CONSTITUTIONAL: No fever,   EYES: no acute visual disturbances  NECK: No pain or stiffness  RESPIRATORY: No cough; No shortness of breath  CARDIOVASCULAR: No chest pain, no palpitations  GASTROINTESTINAL: No pain. No nausea or vomiting; No diarrhea   NEUROLOGICAL: No headache or numbness, no tremors, + drowsy    MUSCULOSKELETAL: No joint pain, no muscle pain  GENITOURINARY: no dysuria, no frequency, no hesitancy  PSYCHIATRY: no depression , no anxiety  ALL OTHER  ROS negative        Vital Signs Last 24 Hrs  T(C): 36.6 (28 Nov 2023 05:00), Max: 36.6 (27 Nov 2023 20:14)  T(F): 97.8 (28 Nov 2023 05:00), Max: 97.8 (27 Nov 2023 20:14)  HR: 61 (28 Nov 2023 05:00) (61 - 73)  BP: 219/92 (27 Nov 2023 20:14) (103/73 - 219/92)  BP(mean): --  RR: 20 (28 Nov 2023 05:00) (18 - 20)  SpO2: 95% (28 Nov 2023 05:00) (95% - 100%)    Parameters below as of 28 Nov 2023 05:00  Patient On (Oxygen Delivery Method): room air        ________________________________________________  PHYSICAL EXAM:  GENERAL: NAD, resting comfortably in bed  HEENT: Normocephalic; conjunctivae and sclerae clear; moist mucous membranes;   NECK : supple  CHEST/LUNG: Clear to auscultation bilaterally with good air entry   HEART: S1 S2  regular; no murmurs, gallops or rubs  ABDOMEN: Soft, Nontender, Nondistended; Bowel sounds present  EXTREMITIES: no cyanosis; no edema; no calf tenderness  SKIN: warm and dry; no rash  NERVOUS SYSTEM:  Awake and alert; Oriented  to place, person and time ; no new deficits    _________________________________________________  LABS:                        10.3   6.20  )-----------( 162      ( 28 Nov 2023 06:25 )             32.2     11-28    142  |  111<H>  |  14  ----------------------------<  91  3.6   |  29  |  0.93    Ca    7.8<L>      28 Nov 2023 06:25        Urinalysis Basic - ( 28 Nov 2023 06:25 )    Color: x / Appearance: x / SG: x / pH: x  Gluc: 91 mg/dL / Ketone: x  / Bili: x / Urobili: x   Blood: x / Protein: x / Nitrite: x   Leuk Esterase: x / RBC: x / WBC x   Sq Epi: x / Non Sq Epi: x / Bacteria: x      CAPILLARY BLOOD GLUCOSE            RADIOLOGY & ADDITIONAL TESTS:    Imaging  Reviewed:  YES/NO    Consultant(s) Notes Reviewed:   YES/ No      Plan of care was discussed with patient and /or primary care giver; all questions and concerns were addressed

## 2023-11-29 NOTE — PROGRESS NOTE ADULT - PROBLEM SELECTOR PLAN 3
multifactorial, likely in setting of vascular dementia, chronic alcoholism, orthostatic hypotension  MRI 2022 multiple chronic cerebellar infarcts  Brain and orbits MRI showed - notable for chronic cerebral atrophy, most prominent posteriorly  neurology following  PT rec home PT multifactorial, likely in setting of vascular dementia, chronic alcoholism, orthostatic hypotension  - MRI 2022 multiple chronic cerebellar infarcts  - Brain and orbits MRI showed - notable for mild chronic microvascular changes, no acute infarction. unremarkable orbits  - neurology followed  - PT rec home PT

## 2023-11-29 NOTE — PROGRESS NOTE ADULT - PROBLEM SELECTOR PLAN 12
pending further improvement on droxidopa  cw ace bandage on LE - orthostatics improved   recommend family to buy compression stockings  once optimized PT rec home PT  palliative care onboard, poor prognosis, anticipating home hospice
pending further improvement on droxidopa  once optimized PT rec home PT  f/u palliative care recs
pending further improvement on droxidopa  cw ace bandage on LE - orthostatics improved   recommend family to buy compression stockings  once optimized PT rec home PT  palliative care onboard, poor prognosis, anticipating home hospice
pending further improvement on droxidopa  cw ace bandage on LE - orthostatics improved   recommend family to buy compression stockings  once optimized PT rec home PT  f/u palliative care recs- poor prognosis
waiting for daughter to bring in Droxidopa on 11/24/23, if pt still orthostatic recommend palliative consult.

## 2023-11-29 NOTE — PROGRESS NOTE ADULT - NS ATTEND AMEND GEN_ALL_CORE FT
Patient seen at bedside, lying in bed, states he feels good, denies dizziness.  VS reviewed, sBP ranging 110-140s.  On exam, patient is AOx3, NAD, cardiopulmonary exams unremarkable, abdomen soft, NT/ND, extremities with mild edema.   Labs reviewed, CBC, BMP with normal Cr.    Assessment and plan:  81yo M with HTN, HLD, prior strokes, orthostatic hypotension, cirrhosis with recent paracentesis in 10/2023, p/w 2-week history of worsening dizziness on standing with low BP and chronically worsening bilaterally blurry vision with presentation concerning for Lewy Body dementia and dysautonomia. Found to have L sided opacity on CXR, treated for PNA.  At this time, course most concerning for profound orthostatic hypotension that has proved to be very difficult to control. Continuing to titrate medications, now on max dose of midodrine and droxidopa, however continues to have episodes of severe orthostatic hypotension with dizziness. Given inability to control symptoms and DC patient, involving Palliative Care on 11/27 to assist with conversations around disease trajectory.    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    - neurology recs appreciated  - patient was previously on three agent including fludrocortisone for the orthostatic hypotension, but it was discontinued given the ineffectiveness and high BP at rest combined with hypokalemia  - BP at rest improving after decreasing droxidopa 200mg ->100mg TID  - PT recs appreciated, home PT recommended   - appreciate palliative care input 11/27, pall care SW involved, appreciate the assistance with dispo plan  - Lasix discontinued on 11/23 given the persistent orthostatic hypotension    # Pneumonia, likely 2/2 aspiration, resolved    - CXR 11/15 with L sided opacity, possibly 2/2 aspiration iso chronic dysphagia as below    - s/p 5-day course of CTX    # Cirrhosis, 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 Daughter reports all the workup was negative as the etiology of cirrhosis    -completed paracentesis on 11/22    # Hx of stroke    # Chronic dysphagia, gait disturbance    - Found to have stroke in 2022, and it was deemed the cause of his dysphagia, gait disturbance    - Brain MRI only notable for chronic cerebral atrophy   - Continue home aspirin, statin      # DVT ppx    - Lovenox.

## 2023-11-29 NOTE — PROGRESS NOTE ADULT - PROBLEM SELECTOR PLAN 5
pt has chronic dysphagia   seen by speech and swallow reccs noted  CXR Left retrocardiac opacity, new from previous imaging   likely aspiration  complete 5 days CFTx  s/p Rocephin and azithromycin in ED  aspiration precautions  MBS order d/elsi pt has chronic dysphagia   - seen by speech and swallow reccs noted  - CXR Left retrocardiac opacity, new from previous imaging -- likely aspiration  complete 5 days CFTx  s/p Rocephin and azithromycin in ED  aspiration precautions  MBS order d/elsi pt has chronic dysphagia   - seen by speech and swallow reccs noted  - CXR Left retrocardiac opacity, new from previous imaging -- likely aspiration  - completed 5 days CFTx  - aspiration precautions  - pt had MBS OP who recs soft and bite sized with mildly thickened liquids

## 2023-11-29 NOTE — PROGRESS NOTE ADULT - PROBLEM SELECTOR PLAN 5
Daughter reports chronic dysphagia, slowly progressive over last 2 years, with mild to moderate oropharyngeal dysphagia confirmed by recent outpatient MBS in late Sept. 2023, also with prominent secretions, on long term glycopyrrolate    Having difficulty tolerating soft and bite sized diet, recently downgraded to mince and moist, with thickened liquids  Continue oral glycopyrrolate 1 mg BID  ? consider SLP reeval.

## 2023-11-29 NOTE — PROGRESS NOTE ADULT - PROBLEM SELECTOR PLAN 4
hgb 10.4  stable no signs of active bleeding   c/w home med folic acid 1 mg daily hgb 10.4 --> 10.7  - no signs of active bleeding   - c/w home med folic acid 1 mg daily

## 2023-11-29 NOTE — PROGRESS NOTE ADULT - PROBLEM SELECTOR PLAN 7
As above.  84 yo male with multiple medical comorbidities, including refractory orthostasis related to Lewy Body dementia, prior CVA, chronic dysphagia, and advanced cirrhosis.  Given his chronic/intractable orthostasis, chronic dysphagia with risk for ongoing aspiration, expected decline 2/2 Lewy Body dementia, and emerging protein-calorie malnutrition, in combination with his advanced cirrhosis, patient appears to be hospice appropriate with a general prognosis in the range of 6-12 months.    See previous [facility] discussion from 11/27--disease trajectory reviewed with patient and daughter in detail, anticipatory guidance given.  Daughter amenable to home hospice, but declined by [facility], will pursue referral to visiting home MD program    Otherwise continue management as per primary medical team  Ongoing collaboration with Pall Care team SW  MOLST orders in place with DNR/DNI/no PEG  Discussed with medical team and Dr. Turner in detail  Palliative Care team will continue to follow.

## 2023-11-29 NOTE — PROGRESS NOTE ADULT - PROBLEM SELECTOR PLAN 11
dvt - Lovenox  PPI c/w ace bandage on LE - orthostatics improved  once optimized PT rec home PT  palliative care onboard  F/U with palliative SW

## 2023-11-29 NOTE — PROGRESS NOTE ADULT - PROBLEM SELECTOR PLAN 3
As above, prior Neurology notes appreciated.  Also with possible component of vascular dementia given history of CVA and prior CT/MRI findings.  Continue supportive care.

## 2023-11-29 NOTE — PROGRESS NOTE ADULT - SUBJECTIVE AND OBJECTIVE BOX
follow up on:  complex medical decision making related to goals of care    [facility] Geriatric and Palliative Consult Service:  Gloria Dolan DO: cell ([phone])  Ramon Rocha MD: cell ([phone])  Rigoberto Merchant NP: cell ([phone])   Colby Marsh LMSW: cell ([phone])   Anahi Alexandre NP: via [employer]    Can contact any Palliative Team member via [employer] for consults and questions      OVERNIGHT EVENTS:    Present Symptoms: Mild, Moderate, Severe  Pain:             Location -                               Aggravating factors -             Quality -             Radiation -             Timing-             Severity (0-10 scale):             Minimal acceptable level (0-10 scale):  Fatigue:  Nausea:  Lack of Appetite:   SOB:  Depression:  Anxiety:  Review of Systems: [All others negative or Unable to obtain due to poor mentation]    CPOT:    https://ccs-st.org/resources/Documents/Sedation%20Analgesia%20Delirium%20in%20CC/CCS%20STH%20Guideline%20template%20CPOT.pdf  PAIN AD Score:   http://geriatrictoolkit.[facility]/cog/painad.pdf (press ctrl +  left click to view)MEDICATIONS  (STANDING):  aspirin enteric coated 81 milliGRAM(s) Oral daily  atorvastatin 40 milliGRAM(s) Oral at bedtime  dorzolamide 2% Ophthalmic Solution 1 Drop(s) Both EYES three times a day  droxidopa 100 milliGRAM(s) Oral three times a day  enoxaparin Injectable 40 milliGRAM(s) SubCutaneous every 24 hours  folic acid 1 milliGRAM(s) Oral daily  glycopyrrolate 1 milliGRAM(s) Oral two times a day  latanoprost 0.005% Ophthalmic Solution 1 Drop(s) Both EYES at bedtime  midodrine. 20 milliGRAM(s) Oral three times a day  nystatin Powder 1 Application(s) Topical every 12 hours  pantoprazole    Tablet 40 milliGRAM(s) Oral before breakfast  senna 2 Tablet(s) Oral at bedtime  thiamine 100 milliGRAM(s) Oral daily    MEDICATIONS  (PRN):  melatonin 5 milliGRAM(s) Oral at bedtime PRN Insomnia  ondansetron Injectable 4 milliGRAM(s) IV Push every 8 hours PRN Nausea and/or Vomiting      PHYSICAL EXAM:  Vital Signs Last 24 Hrs  T(C): 36.7 (29 Nov 2023 20:29), Max: 37.1 (29 Nov 2023 04:45)  T(F): 98 (29 Nov 2023 20:29), Max: 98.8 (29 Nov 2023 04:45)  HR: 66 (29 Nov 2023 20:29) (63 - 69)  BP: 102/65 (29 Nov 2023 20:29) (96/63 - 119/58)  BP(mean): --  RR: 18 (29 Nov 2023 20:29) (18 - 18)  SpO2: 95% (29 Nov 2023 20:29) (95% - 97%)    Parameters below as of 29 Nov 2023 20:29  Patient On (Oxygen Delivery Method): room air        General: alert  oriented x ____    lethargic distressed cachexia  verbal nonverbal  unarousable     Palliative Performance Scale/Karnofsky Score:  http://npcrc.org/files/news/palliative_performance_scale_ppsv2.pdf    HEENT: no abnormal lesion, dry mouth  ET tube/trach oral lesions:  Lungs: tachypnea/labored breathing, audible excessive secretions  CV: RRR, S1S2, tachycardia  GI: soft non distended non tender  incontinent               PEG/NG/OG tube  constipation  last BM:   : incontinent  oliguria/anuria  salinas  Musculoskeletal: weakness x4 edema x4    ambulatory with assistance   mostly/fully bedbound/wheelchair bound  Skin: no abnormal skin lesions, poor skin turgor, pressure ulcers stage:   Neuro: no deficits, mild cognitive impairment dsyphagia/dysarthria paresis  Oral intake ability: unable/only mouth care, minimal moderate full capability    LABS:                          10.7   5.81  )-----------( 159      ( 29 Nov 2023 06:05 )             32.3     11-29    143  |  112<H>  |  13  ----------------------------<  91  3.5   |  29  |  0.85    Ca    7.7<L>      29 Nov 2023 06:05      Urinalysis Basic - ( 29 Nov 2023 06:05 )    Color: x / Appearance: x / SG: x / pH: x  Gluc: 91 mg/dL / Ketone: x  / Bili: x / Urobili: x   Blood: x / Protein: x / Nitrite: x   Leuk Esterase: x / RBC: x / WBC x   Sq Epi: x / Non Sq Epi: x / Bacteria: x        RADIOLOGY & ADDITIONAL STUDIES: follow up on:  complex medical decision making related to goals of care    [facility] Geriatric and Palliative Consult Service:  Gloria Dolan DO: cell ([phone])  Ramon Rocha MD: cell ([phone])  Rigoberto Merchant NP: cell ([phone])   Colby Marsh LMSW: Dtime ([phone])   Anahi Alexandre NP: via [employer] Teams    Can contact any Palliative Team member via [employer] Teams for consults and questions      INTERIM HISTORY/OVERNIGHT EVENTS:  Patient declined for hospice by [facility].  Otherwise none.    Patient examined at bedside earlier this morning.  Remains alert and oriented x 2-3.  Reports feeling somewhat better, still with ++ fatigue, but feeling less dizzy.  Denies chest pain, SOB, nausea, or vomiting.  Otherwise has no acute complaints.      Present Symptoms: Mild, Moderate, Severe  Pain:  Denies, 0/10  Fatigue: moderate  Nausea:  denies  Lack of Appetite:   denies  SOB:  denies  Depression:  denies  Anxiety:  denies  Review of Systems:  All other systems reviewed and are negative      MEDICATIONS  (STANDING):  aspirin enteric coated 81 milliGRAM(s) Oral daily  atorvastatin 40 milliGRAM(s) Oral at bedtime  dorzolamide 2% Ophthalmic Solution 1 Drop(s) Both EYES three times a day  droxidopa 100 milliGRAM(s) Oral three times a day  enoxaparin Injectable 40 milliGRAM(s) SubCutaneous every 24 hours  folic acid 1 milliGRAM(s) Oral daily  glycopyrrolate 1 milliGRAM(s) Oral two times a day  latanoprost 0.005% Ophthalmic Solution 1 Drop(s) Both EYES at bedtime  midodrine. 20 milliGRAM(s) Oral three times a day  nystatin Powder 1 Application(s) Topical every 12 hours  pantoprazole    Tablet 40 milliGRAM(s) Oral before breakfast  senna 2 Tablet(s) Oral at bedtime  thiamine 100 milliGRAM(s) Oral daily    MEDICATIONS  (PRN):  melatonin 5 milliGRAM(s) Oral at bedtime PRN Insomnia  ondansetron Injectable 4 milliGRAM(s) IV Push every 8 hours PRN Nausea and/or Vomiting      PHYSICAL EXAM:  Vital Signs Last 24 Hrs  T(C): 36.7 (29 Nov 2023 20:29), Max: 37.1 (29 Nov 2023 04:45)  T(F): 98 (29 Nov 2023 20:29), Max: 98.8 (29 Nov 2023 04:45)  HR: 66 (29 Nov 2023 20:29) (63 - 69)  BP: 102/65 (29 Nov 2023 20:29) (96/63 - 119/58)  BP(mean): --  RR: 18 (29 Nov 2023 20:29) (18 - 18)  SpO2: 95% (29 Nov 2023 20:29) (95% - 97%)    Parameters below as of 29 Nov 2023 20:29  Patient On (Oxygen Delivery Method): room air        General: alert  oriented x _2 to 3___     sl cachectic with temporal wasting, NAD    Palliative Performance Scale/Karnofsky Score:  30%  http://npcrc.org/files/news/palliative_performance_scale_ppsv2.pdf    HEENT:  EOMI anicteric, pharynx clear, MM moist  Lungs:  clear to auscultation, respirations unlabored  CV: RRR, normal S1 and S2, no murmur  GI: soft non distended non tender + normal bowel sounds  Musculoskeletal: mild weakness x4 in all extremities, mostly bedbound, tr edema of LE bilaterally   Skin: no abnormal skin lesions or DU noted  Neuro: no focal deficits, + mild cognitive impairment, + dysphagia  Oral intake ability:  moderate capability, on minced and moist diet with mildly thickened liquids    LABS:                          10.7   5.81  )-----------( 159      ( 29 Nov 2023 06:05 )             32.3     11-29    143  |  112<H>  |  13  ----------------------------<  91  3.5   |  29  |  0.85    Ca    7.7<L>      29 Nov 2023 06:05    Albumin: 1.7 g/dL (11.16.23 @ 05:05)      Urinalysis Basic - ( 29 Nov 2023 06:05 )    Color: x / Appearance: x / SG: x / pH: x  Gluc: 91 mg/dL / Ketone: x  / Bili: x / Urobili: x   Blood: x / Protein: x / Nitrite: x   Leuk Esterase: x / RBC: x / WBC x   Sq Epi: x / Non Sq Epi: x / Bacteria: x        RADIOLOGY & ADDITIONAL STUDIES:

## 2023-11-29 NOTE — PROGRESS NOTE ADULT - PROBLEM SELECTOR PLAN 1
p/w worsening orthostatic hypotension   - GOC DNR/DNI, DEBBIE in chart   - Daughter brought in medication droxidopa on 11/24/23, prescribed by his cardiologist  - Home med droxidopa 100mg q8h (Elevating the head of the bed lessens the risk of supine hypertension, and blood pressure should be measured in this position)  - c/w home midodrine 20mg TID  - c/w ace bandage   - Palliative Care Dr. Aj del valle, apprec recs   - Neurology Dr. Wayne weir

## 2023-11-29 NOTE — PROGRESS NOTE ADULT - PROBLEM SELECTOR PLAN 6
Clinical evidence indicates that the patient has Severe protein calorie malnutrition/ 3rd degree    In context of     Acute Illness/Injury (>7days)    vs    Chronic Illness (>1 month)--worsening cirrhosis combined with progression of underlying Lewy Body dementia with worsening dysphagia, as evidenced    Energy/Food intake <50% of estimated energy requirement >5 days  Weight loss: Moderate - severe (patient reports 30-40 lbs weight loss over last several months)  Body Fat loss: Severe   (slightly cachectic with mild to moderate temporal wasting)  Muscle mass loss: Severe    albumin 1.7  Fluid Accumulation: Severe (Fluid overload with recurrent ascites, + peripheral edema)   Strength: weakened severe (bedbound)    Recommend:   pleasure feeds with mince and moist diet w/ mildly thickend liquids as tolerated - aspiration precautions, careful hand-feeding, teaching to caregivers  Dietary consult appreciated, on supplementation with Two Oleg HN daily    Per GOC discussion on 11/27, patient does not want a feeding tube.

## 2023-11-29 NOTE — PROGRESS NOTE ADULT - SUBJECTIVE AND OBJECTIVE BOX
NP Note discussed with  Primary Attending    Patient is a 83y old  Male who presents with a chief complaint of orthostatic hypotension & gait instability (28 Nov 2023 08:35)      INTERVAL HPI/OVERNIGHT EVENTS:  82 y/o Male with PMHx of HTN, HLD, prior strokes, orthostatic hypotension, ETOH abuse, cirrhosis, hx slurred speech and gait instability presenting with worsening orthostatic hypotension for the past 3 days. Patient has a HHA who measures his BP x3 per day, his SBP would drop from 180 to 80 mmHg he would get associated dizziness, blurry vision, pre-syncope episodes with "almost fall episodes". Patient used to ambulate with a cane and now uses a walker for balance. He was recently seen by his cardiologist Dr. Margarito Obando who wanted to add Droxidopa with midodrine to sustain blood pressure.     Patient's neurologist is Dr. Shaka Up who referred him to neuro-ophthalmology evaluation for blurry vision, patient had a full work up and was prescribed eye glasses however still endorses vison problems. Patient was admitted back in November 2022 for gait instability and slurred speech, he had an MRI showed chronic appearing infarcts/ neuro called for evaluation. Patient had a speech and swallow evaluation this September, recommendations were soft and bite sized with mildly thickened liquids.     CTH, CT- C spine no acute pathology     His CXR revealed left retrocardiac opacity admitted for aspiration pneumonia and orthostatic hypotension. Patient completed 5 day course of antibiotics ceftriaxone.   Pt also had a Ultrasound of Abd that revealed moderate ascites 2/2 Liver Cirrhosis, s/p paracentesis 11/20 and removed 2L. Pt was also started on fludrocortisone since 11/16/23, however pt still has symptomatic orthostatic hypotension despite increased dosage. Daughter was able to bring home medication droxidopa, now started on 11/24/23.  Palliative Care was consulted for worsening orthostatic hypotension.     Once optimized pt is for home physical therapy.      MEDICATIONS  (STANDING):  aspirin enteric coated 81 milliGRAM(s) Oral daily  atorvastatin 40 milliGRAM(s) Oral at bedtime  dorzolamide 2% Ophthalmic Solution 1 Drop(s) Both EYES three times a day  droxidopa 100 milliGRAM(s) Oral three times a day  enoxaparin Injectable 40 milliGRAM(s) SubCutaneous every 24 hours  folic acid 1 milliGRAM(s) Oral daily  glycopyrrolate 1 milliGRAM(s) Oral two times a day  latanoprost 0.005% Ophthalmic Solution 1 Drop(s) Both EYES at bedtime  midodrine. 20 milliGRAM(s) Oral three times a day  nystatin Powder 1 Application(s) Topical every 12 hours  pantoprazole    Tablet 40 milliGRAM(s) Oral before breakfast  senna 2 Tablet(s) Oral at bedtime  thiamine 100 milliGRAM(s) Oral daily    MEDICATIONS  (PRN):  melatonin 5 milliGRAM(s) Oral at bedtime PRN Insomnia  ondansetron Injectable 4 milliGRAM(s) IV Push every 8 hours PRN Nausea and/or Vomiting      __________________________________________________  REVIEW OF SYSTEMS:    CONSTITUTIONAL: No fever,   EYES: no acute visual disturbances  NECK: No pain or stiffness  RESPIRATORY: No cough; No shortness of breath  CARDIOVASCULAR: No chest pain, no palpitations  GASTROINTESTINAL: No pain. No nausea or vomiting; No diarrhea   NEUROLOGICAL: No headache or numbness, no tremors  MUSCULOSKELETAL: No joint pain, no muscle pain  GENITOURINARY: no dysuria, no frequency, no hesitancy  PSYCHIATRY: no depression , no anxiety  ALL OTHER  ROS negative        Vital Signs Last 24 Hrs  T(C): 37.1 (29 Nov 2023 04:45), Max: 37.1 (28 Nov 2023 20:21)  T(F): 98.8 (29 Nov 2023 04:45), Max: 98.8 (29 Nov 2023 04:45)  HR: 67 (29 Nov 2023 04:45) (67 - 77)  BP: 119/58 (29 Nov 2023 04:45) (112/71 - 141/69)  BP(mean): --  RR: 18 (29 Nov 2023 04:45) (18 - 18)  SpO2: 97% (29 Nov 2023 04:45) (97% - 97%)    Parameters below as of 29 Nov 2023 04:45  Patient On (Oxygen Delivery Method): room air        ________________________________________________  PHYSICAL EXAM:  GENERAL: NAD, chronically-ill appearing  HEENT: Normocephalic;  conjunctivae and sclerae clear; moist mucous membranes;   NECK : supple  CHEST/LUNG: Clear to auscultation bilaterally with good air entry   HEART: S1 S2  regular;  ABDOMEN: Soft, Nontender, Nondistended; Bowel sounds present  EXTREMITIES: no cyanosis; no edema; no calf tenderness  SKIN: warm and dry; no rash  NERVOUS SYSTEM:  Awake and alert; Oriented x 2-3    _________________________________________________  LABS:                        10.7   5.81  )-----------( 159      ( 29 Nov 2023 06:05 )             32.3     11-29    143  |  112<H>  |  13  ----------------------------<  91  3.5   |  29  |  0.85    Ca    7.7<L>      29 Nov 2023 06:05        Urinalysis Basic - ( 29 Nov 2023 06:05 )    Color: x / Appearance: x / SG: x / pH: x  Gluc: 91 mg/dL / Ketone: x  / Bili: x / Urobili: x   Blood: x / Protein: x / Nitrite: x   Leuk Esterase: x / RBC: x / WBC x   Sq Epi: x / Non Sq Epi: x / Bacteria: x      CAPILLARY BLOOD GLUCOSE            RADIOLOGY & ADDITIONAL TESTS:  < from: MR Head w/wo IV Cont (11.16.23 @ 17:30) >    ACC: [id number] EXAM:  MR ORBITS ONLY WAWIC   ORDERED BY: EASTON BLAKELY     ACC: [id number] EXAM:  MR BRAIN WAW IC   ORDERED BY: EASTON BLAKELY     PROCEDURE DATE:  11/16/2023          INTERPRETATION:  CLINICAL INDICATIONS: Near syncope    COMPARISON: Head CT dated 11/15/2023.    TECHNIQUE: MRI brain and orbits: Multiplanar, multisequence MR imaging of   the brain are obtained with and the administration of 7.5 cc intravenous   Gadavist contrast. 0 cc of contrast was discarded.    FINDINGS:    MRI orbits:  The patient is status post bilateral ocular lens replacement surgery. The   globes are intact. No retrobulbar mass. Extraocular muscles are   symmetric. No abnormal optic nerve enhancement. Optic chiasm is   unremarkable.    MRI BRAIN:  There isno abnormal restricted diffusion to suggest acute infarction.   Scattered periventricular and subcortical white matter T2 /FLAIR   hyperintensities are seen without mass effect, nonspecific, likely   representing mild chronic microvascular changes. Normal T2 flow-voids are   seen within  the intracranial vasculature. The lateral ventricles and   cortical sulci are age-appropriate in size and configuration. There is no   mass, mass effect, or extra-axial fluid collection. 5 mm susceptibility   artifact in left parietal lobe on image 53 of series 8 and may suggest a   small cavernoma.. Midline structures are normal.   Mild inflammatory   mucosal changes are seen throughout the various portions of the paranasal   sinuses. The mastoid air cells are unremarkable.      IMPRESSION: Mild chronic microvascular changes. No acute infarction.   Unremarkable orbits.    --- End of Report ---            TREMAYNE PAREDES MD; Attending Radiologist  This document has been electronically signed. Nov 17 2023  9:27AM    < end of copied text >    Imaging Personally Reviewed:  YES/NO    Consultant(s) Notes Reviewed:   YES/ No    Care Discussed with Consultants :     Plan of care was discussed with patient and /or primary care giver; all questions and concerns were addressed and care was aligned with patient's wishes.

## 2023-11-29 NOTE — PROGRESS NOTE ADULT - PROBLEM SELECTOR PLAN 1
Patient with advanced cirrhosis, now with recurrent ascites requiring frequent paracentesis, and worsening hypoalbuminemia.  Not a candidate for diuretics due to his chronic orthostatic hypotension.  In addition, cirrhosis likely to further exacerbate patient's orthostasis and lead to additional debility and functional decline over the next 6-12 months, including increased risk of reinfection, falls resulting in injury, recurrent hospitalizations, encephalopathy, and death.  Given his chronic/intractable orthostasis, chronic dysphagia with risk for ongoing aspiration, expected decline 2/2 Lewy Body dementia, and emerging protein-calorie malnutrition, in combination with his advanced cirrhosis, patient appears to be hospice appropriate with a general prognosis in the range of 6-12 months.    Continue supportive care  Daughter updated via phone, aware that patient was not accepted by hospice, in agreement with ongoing medical management and pursuing home visiting MD referral

## 2023-11-29 NOTE — PROGRESS NOTE ADULT - ASSESSMENT
82 y/o Male with PMHx of HTN, HLD, prior strokes, orthostatic hypotension, ETOH abuse, cirrhosis, hx slurred speech and gait instability presenting with worsening orthostatic hypotension. Pt is admitted for orthostatic hypotension management and aspiration PNA. Hospital course complicated by moderate ascites 2/2 Liver Cirrhosis, s/p paracentesis 11/20 and removed 2L.

## 2023-11-29 NOTE — PROGRESS NOTE ADULT - ASSESSMENT
82 y/o Male  with HTN, HLD, prior strokes, orthostatics hypotension, prior ETOH abuse, advanced cirrhosis (with prior outpatient paracentesis in Oct. 2022), prior CVA by MRI (2022), hx slurred speech and gait instability, originally presenting to [facility] ED on 11/15 with severe worsening orthostatic hypotension  x 3 days.  Patient was admitted for orthostatic hypotension, presumed aspiration pneumonia in the setting of chronic dysphagia, and cirrhosis.  Received IV azithromycin x 1 and treated with 5 day course of IV Rocephin.  Abdominal sonogram (11/16) demonstrated moderate to large ascites in all 4 quadrants, Underwent repeat paracentesis via IR 11/21, 2000 ml serous ascites drained, cultures negative.  Patient with continued symptomatic orthostasis despite titration to max dose of 3 medications (midodrine 20 mg TID + fludrocortisone 0.2 mg daily + droxidopa 200 mg TID), with little improvement in symptoms, now complicated by frequent BP lability and supine HTN.  Seen by Neurology, who opined patient to have neurogenic orthostatic hypotension in the setting of likely Lewy body dementia without behavioral disturbance.

## 2023-11-29 NOTE — PROGRESS NOTE ADULT - PROBLEM SELECTOR PLAN 2
hx cirrhosis   abdomen US 11/17 revealed moderate ascites  s/p paracentesis 11/20/23- 2liters fluid removed  asymptomatic hx cirrhosis   - abdomen US 11/17 revealed moderate ascites  - s/p paracentesis 11/20/23- 2liters fluid removed

## 2023-11-29 NOTE — PROGRESS NOTE ADULT - PROBLEM SELECTOR PLAN 7
assist with feedings, toileting  aspiration and fall precautions - aspiration and fall precautions  - supportive measures

## 2023-11-29 NOTE — PROGRESS NOTE ADULT - PROBLEM SELECTOR PLAN 2
Likely neurogenic in context of Lewy Body dementia, aggravated by cirrhosis.  Remains grossly symptomatic despite maximal doses of multiple meds, therapy becoming limited by supine HTN    Symptoms somewhat improved today  Continue titration of  midodrine and droxidopa as per primary medical team

## 2023-11-29 NOTE — PROGRESS NOTE ADULT - PROBLEM SELECTOR PLAN 8
hx of stroke 2022  MRI noted-Mild chronic microvascular changes. No acute infarction.   add aspirin 81 mg ec daily  c/w atorvastatin 40 mg daily hx of stroke 2022  - MRI notable for mild chronic microvascular changes, no acute infarction. unremarkable orbits  - continue aspirin 81 mg ec daily  - c/w atorvastatin 40 mg daily

## 2023-11-30 NOTE — PROGRESS NOTE ADULT - ASSESSMENT
84 y/o Male  with HTN, HLD, prior strokes, orthostatics hypotension, prior ETOH abuse, advanced cirrhosis (with prior outpatient paracentesis in Oct. 2022), prior CVA by MRI (2022), hx slurred speech and gait instability, originally presenting to [facility] ED on 11/15 with severe worsening orthostatic hypotension  x 3 days.  Patient was admitted for orthostatic hypotension, presumed aspiration pneumonia in the setting of chronic dysphagia, and cirrhosis.  Received IV azithromycin x 1 and treated with 5 day course of IV Rocephin.  Abdominal sonogram (11/16) demonstrated moderate to large ascites in all 4 quadrants, Underwent repeat paracentesis via IR 11/21, 2000 ml serous ascites drained, cultures negative.  Patient with continued symptomatic orthostasis despite titration to max dose of 3 medications (midodrine 20 mg TID + fludrocortisone 0.2 mg daily + droxidopa 200 mg TID), with little improvement in symptoms, now complicated by frequent BP lability and supine HTN.  Seen by Neurology, who opined patient to have neurogenic orthostatic hypotension in the setting of likely Lewy body dementia without behavioral disturbance.

## 2023-11-30 NOTE — PROGRESS NOTE ADULT - PROBLEM SELECTOR PLAN 2
hx cirrhosis   - abdomen US 11/17 revealed moderate ascites  - s/p paracentesis 11/20/23- 2liters fluid removed

## 2023-11-30 NOTE — PROGRESS NOTE ADULT - PROBLEM SELECTOR PLAN 8
hx of stroke 2022  - MRI notable for mild chronic microvascular changes, no acute infarction. unremarkable orbits  - continue aspirin 81 mg ec daily  - c/w atorvastatin 40 mg daily

## 2023-11-30 NOTE — PROGRESS NOTE ADULT - PROBLEM SELECTOR PLAN 11
c/w ace bandage on LE - orthostatics improved  once optimized PT rec home PT  palliative care onboard  F/U with palliative SW c/w ace bandage on LE - orthostatics improved  once optimized PT rec home PT  palliative care onboard  F/U with palliative SW -- possible [facility] home hospice

## 2023-11-30 NOTE — PROGRESS NOTE ADULT - PROBLEM SELECTOR PLAN 5
pt has chronic dysphagia   - seen by speech and swallow reccs noted  - CXR Left retrocardiac opacity, new from previous imaging -- likely aspiration  - completed 5 days CFTx  - aspiration precautions  - pt had MBS OP who recs soft and bite sized with mildly thickened liquids

## 2023-11-30 NOTE — PROGRESS NOTE ADULT - NS ATTEND AMEND GEN_ALL_CORE FT
Patient seen at bedside, states he feels good, was able to stand and ambulate to the bathroom with assistance this morning.  VS reviewed, sBP noted to be low to 90s today.  On exam, patient is AOx3, NAD, cardiopulmonary exams unremarkable, abdomen soft, NT/ND, extremities with mild edema.   Labs reviewed, CBC, BMP with normal Cr.    Assessment and plan:  83yo M with HTN, HLD, prior strokes, orthostatic hypotension, cirrhosis with recent paracentesis in 10/2023, p/w 2-week history of worsening dizziness on standing with low BP and chronically worsening bilaterally blurry vision with presentation concerning for Lewy Body dementia and dysautonomia. Found to have L sided opacity on CXR, treated for PNA.  At this time, course most concerning for profound orthostatic hypotension that has proved to be very difficult to control. Continuing to titrate medications, now on max dose of midodrine and droxidopa, however continues to have episodes of severe orthostatic hypotension with dizziness. Given inability to control symptoms and DC patient, involving Palliative Care on 11/27 to assist with conversations around disease trajectory.  Now patient is deemed medically stable for discharge, awaiting SW for safe discharge plan.    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    - neurology recs appreciated  - patient was previously on three agent including fludrocortisone for the orthostatic hypotension, but it was discontinued given the ineffectiveness and high BP at rest combined with hypokalemia  - BP at rest improving after decreasing droxidopa 200mg ->100mg TID  - PT recs appreciated, home PT recommended   - appreciate palliative care input 11/27, pall care SW involved, appreciate the assistance with dispo plan  - Lasix discontinued on 11/23 given the persistent orthostatic hypotension    # Pneumonia, likely 2/2 aspiration, resolved    - CXR 11/15 with L sided opacity, possibly 2/2 aspiration iso chronic dysphagia as below    - s/p 5-day course of CTX    # Cirrhosis, 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 Daughter reports all the workup was negative as the etiology of cirrhosis    -completed paracentesis on 11/22    # Hx of stroke    # Chronic dysphagia, gait disturbance    - Found to have stroke in 2022, and it was deemed the cause of his dysphagia, gait disturbance    - Brain MRI only notable for chronic cerebral atrophy   - Continue home aspirin, statin      # DVT ppx    - Lovenox.

## 2023-11-30 NOTE — PROGRESS NOTE ADULT - PROBLEM SELECTOR PLAN 7
As above.  82 yo male with multiple medical comorbidities, including refractory orthostasis related to Lewy Body dementia, prior CVA, chronic dysphagia, and advanced cirrhosis.  Given his chronic/intractable orthostasis, chronic dysphagia with risk for ongoing aspiration, expected decline 2/2 Lewy Body dementia, and emerging protein-calorie malnutrition, in combination with his advanced cirrhosis, patient appears to be hospice appropriate with a general prognosis in the range of 6-12 months.    [place] [facility] [place]disease trajectory reviewed with patient and daughter in detail, anticipatory guidance given.  Daughter amenable to home hospice, but declined by [facility], will pursue referral to visiting home MD program    Otherwise continue management as per primary medical team, orthostasis symptoms appear to be improving  Ongoing collaboration with Pall Care team SW  MOLST orders in place with DNR/DNI/no PEG  Discussed with medical team and Dr. Turner in detail  Palliative Care team will continue to follow.

## 2023-11-30 NOTE — PROGRESS NOTE ADULT - PROBLEM SELECTOR PLAN 6
Clinical evidence indicates that the patient has Severe protein calorie malnutrition/ 3rd degree    In context of     Acute Illness/Injury (>7days)    vs    Chronic Illness (>1 month)--worsening cirrhosis combined with progression of underlying Lewy Body dementia with worsening dysphagia, as evidenced    Energy/Food intake <50% of estimated energy requirement >5 days  Weight loss: Moderate - severe (patient reports 30-40 lbs weight loss over last several months)  Body Fat loss: Severe   (slightly cachectic with mild to moderate temporal wasting)  Muscle mass loss: Severe    albumin 1.7  Fluid Accumulation: Severe (Fluid overload with recurrent ascites, + peripheral edema)   Strength: weakened severe (bedbound)    Recommend:   pleasure feeds with mince and moist diet w/ mildly thickened liquids as tolerated - aspiration precautions, careful hand-feeding, teaching to caregivers  Dietary consult appreciated, on supplementation with Two Oleg HN daily    Per [facility] discussion on 11/27, patient does not want a feeding tube.

## 2023-11-30 NOTE — PROGRESS NOTE ADULT - PROBLEM SELECTOR PLAN 1
Patient with advanced cirrhosis, now with recurrent ascites requiring frequent paracentesis, and worsening hypoalbuminemia.  Not a candidate for diuretics due to his chronic orthostatic hypotension.  In addition, cirrhosis likely to further exacerbate patient's orthostasis and lead to additional debility and functional decline over the next 6-12 months, including increased risk of reinfection, falls resulting in injury, recurrent hospitalizations, encephalopathy, and death.  Given his chronic/intractable orthostasis, chronic dysphagia with risk for ongoing aspiration, expected decline 2/2 Lewy Body dementia, and emerging protein-calorie malnutrition, in combination with his advanced cirrhosis, patient appears to be hospice appropriate with a general prognosis in the range of 6-12 months.    Continue supportive care  Patient not accepted by hospice, family in agreement to pursue home visiting MD referral

## 2023-11-30 NOTE — PROGRESS NOTE ADULT - SUBJECTIVE AND OBJECTIVE BOX
NP Note discussed with  Primary Attending    Patient is a 83y old  Male who presents with a chief complaint of orthostatic hypotension & gait instability (29 Nov 2023 13:35)      INTERVAL HPI/OVERNIGHT EVENTS:  84 y/o Male with PMHx of HTN, HLD, prior strokes, orthostatic hypotension, ETOH abuse, cirrhosis, hx slurred speech and gait instability presenting with worsening orthostatic hypotension for the past 3 days. Patient has a HHA who measures his BP x3 per day, his SBP would drop from 180 to 80 mmHg he would get associated dizziness, blurry vision, pre-syncope episodes with "almost fall episodes". Patient used to ambulate with a cane and now uses a walker for balance. He was recently seen by his cardiologist Dr. Margarito Obando who wanted to add Droxidopa with midodrine to sustain blood pressure.     Patient's neurologist is Dr. Shaka Up who referred him to neuro-ophthalmology evaluation for blurry vision, patient had a full work up and was prescribed eye glasses however still endorses vison problems. Patient was admitted back in November 2022 for gait instability and slurred speech, he had an MRI showed chronic appearing infarcts/ neuro called for evaluation. Patient had a speech and swallow evaluation this September, recommendations were soft and bite sized with mildly thickened liquids.     CTH, CT- C spine no acute pathology     His CXR revealed left retrocardiac opacity admitted for aspiration pneumonia and orthostatic hypotension. Patient completed 5 day course of antibiotics ceftriaxone.   Pt also had a Ultrasound of Abd that revealed moderate ascites 2/2 Liver Cirrhosis, s/p paracentesis 11/20 and removed 2L. Pt was also started on fludrocortisone since 11/16/23, however pt still has symptomatic orthostatic hypotension despite increased dosage. Daughter was able to bring home medication droxidopa, now started on 11/24/23.  Palliative Care was consulted for worsening orthostatic hypotension.     Once optimized pt is for home physical therapy.      MEDICATIONS  (STANDING):  aspirin enteric coated 81 milliGRAM(s) Oral daily  atorvastatin 40 milliGRAM(s) Oral at bedtime  dorzolamide 2% Ophthalmic Solution 1 Drop(s) Both EYES three times a day  droxidopa 100 milliGRAM(s) Oral three times a day  enoxaparin Injectable 40 milliGRAM(s) SubCutaneous every 24 hours  folic acid 1 milliGRAM(s) Oral daily  glycopyrrolate 1 milliGRAM(s) Oral two times a day  latanoprost 0.005% Ophthalmic Solution 1 Drop(s) Both EYES at bedtime  midodrine. 20 milliGRAM(s) Oral three times a day  nystatin Powder 1 Application(s) Topical every 12 hours  pantoprazole    Tablet 40 milliGRAM(s) Oral before breakfast  senna 2 Tablet(s) Oral at bedtime  thiamine 100 milliGRAM(s) Oral daily    MEDICATIONS  (PRN):  melatonin 5 milliGRAM(s) Oral at bedtime PRN Insomnia  ondansetron Injectable 4 milliGRAM(s) IV Push every 8 hours PRN Nausea and/or Vomiting      __________________________________________________  REVIEW OF SYSTEMS:    CONSTITUTIONAL: No fever,   EYES: no acute visual disturbances  NECK: No pain or stiffness  RESPIRATORY: No cough; No shortness of breath  CARDIOVASCULAR: No chest pain, no palpitations  GASTROINTESTINAL: No pain. No nausea or vomiting; No diarrhea   NEUROLOGICAL: No headache or numbness, no tremors  MUSCULOSKELETAL: No joint pain, no muscle pain  GENITOURINARY: no dysuria, no frequency, no hesitancy  PSYCHIATRY: no depression , no anxiety  ALL OTHER  ROS negative        Vital Signs Last 24 Hrs  T(C): 36.8 (30 Nov 2023 12:46), Max: 37.1 (29 Nov 2023 17:59)  T(F): 98.2 (30 Nov 2023 12:46), Max: 98.7 (29 Nov 2023 17:59)  HR: 69 (30 Nov 2023 12:46) (61 - 69)  BP: 97/58 (30 Nov 2023 12:46) (96/63 - 134/64)  BP(mean): --  RR: 18 (30 Nov 2023 12:46) (18 - 18)  SpO2: 97% (30 Nov 2023 12:46) (95% - 97%)    Parameters below as of 30 Nov 2023 12:46  Patient On (Oxygen Delivery Method): room air        ________________________________________________  PHYSICAL EXAM:  GENERAL: NAD, cachectic  HEENT: Normocephalic;  conjunctivae and sclerae clear; moist mucous membranes;   NECK : supple  CHEST/LUNG: Rhonchi  HEART: S1 S2  regular;  ABDOMEN: Soft, Nontender, Nondistended; Bowel sounds present  EXTREMITIES: no cyanosis; + B/L LE edema; no calf tenderness  SKIN: warm and dry; no rash  NERVOUS SYSTEM:  Awake and alert; Oriented  to place, person and time ; no new deficits    _________________________________________________  LABS:                        10.7   5.81  )-----------( 159      ( 29 Nov 2023 06:05 )             32.3     11-29    143  |  112<H>  |  13  ----------------------------<  91  3.5   |  29  |  0.85    Ca    7.7<L>      29 Nov 2023 06:05        Urinalysis Basic - ( 29 Nov 2023 06:05 )    Color: x / Appearance: x / SG: x / pH: x  Gluc: 91 mg/dL / Ketone: x  / Bili: x / Urobili: x   Blood: x / Protein: x / Nitrite: x   Leuk Esterase: x / RBC: x / WBC x   Sq Epi: x / Non Sq Epi: x / Bacteria: x      CAPILLARY BLOOD GLUCOSE            RADIOLOGY & ADDITIONAL TESTS:  no new imaging    Imaging Personally Reviewed:  YES/NO    Consultant(s) Notes Reviewed:   YES/ No    Care Discussed with Consultants :     Plan of care was discussed with patient and /or primary care giver; all questions and concerns were addressed and care was aligned with patient's wishes.

## 2023-11-30 NOTE — PROGRESS NOTE ADULT - PROBLEM SELECTOR PLAN 3
multifactorial, likely in setting of vascular dementia, chronic alcoholism, orthostatic hypotension  - MRI 2022 multiple chronic cerebellar infarcts  - Brain and orbits MRI showed - notable for mild chronic microvascular changes, no acute infarction. unremarkable orbits  - neurology followed  - PT rec home PT

## 2023-11-30 NOTE — PROGRESS NOTE ADULT - PROBLEM SELECTOR PLAN 2
Likely neurogenic in context of Lewy Body dementia, aggravated by cirrhosis.  Remains grossly symptomatic despite maximal doses of multiple meds, therapy becoming limited by supine HTN    Symptoms improved over last 1-2 days  Continue titration of  midodrine and droxidopa as per primary medical team

## 2023-12-01 NOTE — DISCHARGE NOTE NURSING/CASE MANAGEMENT/SOCIAL WORK - PATIENT PORTAL LINK FT
You can access the FollowMyHealth Patient Portal offered by [facility] by registering at the following website: [email or web address]. By joining [employer]’s FollowMyHealth portal, you will also be able to view your health information using other applications (apps) compatible with our system. You can access the FollowMyHealth Patient Portal offered by [facility] by registering at the following website: [email or web address]. By joining [employer]’s FollowMyHealth portal, you will also be able to view your health information using other applications (apps) compatible with our system. You can access the FollowMyHealth Patient Portal offered by [facility] by registering at the following website: [email or web address]. By joining [employer]’s FollowMyHealth portal, you will also be able to view your health information using other applications (apps) compatible with our system.

## 2023-12-01 NOTE — PROGRESS NOTE ADULT - NS ATTEND OPT1 GEN_ALL_CORE
I independently performed the documented:

## 2023-12-01 NOTE — PROGRESS NOTE ADULT - ASSESSMENT
84 y/o Male  with HTN, HLD, prior strokes, orthostatic hypotension, ETOH abuse, cirrhosis, hx slurred speech and gait instability presenting to the ED with worsening orthostatic hypotension for the past 3 days. He was recently seen by his cardiologist for Dr. Margarito Obando who wanted to add Droxidopa with midodrine to sustain blood pressure.     His CXR revealed left retrocardiac opacity admitted for aspiration pneumonia and orthostatic hypotension.   Patient completed 5 day course of antibiotics ceftriaxone.   Pt also had a Ultrasound Abd that revealed moderate ascites 2/2 Liver Cirrhosis, s/p paracentesis 11/20 and removed 2L.     Pt was also started on fludrocortisone since 11/16/23, however pt still has symptomatic orthostatic hypotension despite increased dosage.   Daughter was able to bring home medication droxidopa, now started on 11/24/23. Dose increased  Palliative Care was consulted for worsening orthostatic hypotension.     Awaiting further improvement of Orthostatic hypotension.   Once optimized pt is for home physical therapy.     11/28- Pt with labile BP up to 200's/90's while on 3 agent for orthostatic hypotension.  In light of his elevated BP, correct home dose Droxidopa 100mg TID confirmed with daughter and reinstated.  Ordered for MBS d/elsi after discussion with daughter Viji King at .  Per daughter, pt had MBS done at OSH and does not wish to have study repeated and that her father would not want any aggressive measures such as feeding tubes in the event he is not able to swallow.    12/1-  D/c planning ongoing. Per palliative note pt is declined for home hospice by [facility].  Daughter in agreement for d/c to home.  CM and palliative SW assisting.  Daughter is making arrangement for d/c to home on [place] 12/3.

## 2023-12-01 NOTE — PROGRESS NOTE ADULT - PROBLEM/PLAN-2
DISPLAY PLAN FREE TEXT
good minus
DISPLAY PLAN FREE TEXT

## 2023-12-01 NOTE — PROGRESS NOTE ADULT - PROBLEM SELECTOR PROBLEM 1
Chronic orthostatic hypotension
Cirrhosis
Chronic orthostatic hypotension
Chronic orthostatic hypotension
Cirrhosis
Chronic orthostatic hypotension
restless
Chronic orthostatic hypotension

## 2023-12-01 NOTE — PROGRESS NOTE ADULT - SUBJECTIVE AND OBJECTIVE BOX
NP Note discussed with  Primary Attending    Patient is a 83y old  Male who presents with a chief complaint of orthostatic hypotension & gait instability (30 Nov 2023 15:01)      INTERVAL HPI/OVERNIGHT EVENTS: no new complaints    MEDICATIONS  (STANDING):  aspirin enteric coated 81 milliGRAM(s) Oral daily  atorvastatin 40 milliGRAM(s) Oral at bedtime  dorzolamide 2% Ophthalmic Solution 1 Drop(s) Both EYES three times a day  droxidopa 100 milliGRAM(s) Oral three times a day  enoxaparin Injectable 40 milliGRAM(s) SubCutaneous every 24 hours  folic acid 1 milliGRAM(s) Oral daily  glycopyrrolate 1 milliGRAM(s) Oral two times a day  latanoprost 0.005% Ophthalmic Solution 1 Drop(s) Both EYES at bedtime  midodrine. 20 milliGRAM(s) Oral three times a day  nystatin Powder 1 Application(s) Topical every 12 hours  pantoprazole    Tablet 40 milliGRAM(s) Oral before breakfast  senna 2 Tablet(s) Oral at bedtime  thiamine 100 milliGRAM(s) Oral daily    MEDICATIONS  (PRN):  melatonin 5 milliGRAM(s) Oral at bedtime PRN Insomnia  ondansetron Injectable 4 milliGRAM(s) IV Push every 8 hours PRN Nausea and/or Vomiting      __________________________________________________  REVIEW OF SYSTEMS:    CONSTITUTIONAL: No fever,   EYES: no acute visual disturbances  NECK: No pain or stiffness  RESPIRATORY: No cough; No shortness of breath  CARDIOVASCULAR: No chest pain, no palpitations  GASTROINTESTINAL: No pain. No nausea or vomiting; No diarrhea   NEUROLOGICAL: No headache or numbness, no tremors  MUSCULOSKELETAL: No joint pain, no muscle pain  GENITOURINARY: no dysuria, no frequency, no hesitancy  PSYCHIATRY: no depression , no anxiety  ALL OTHER  ROS negative        Vital Signs Last 24 Hrs  T(C): 36.4 (01 Dec 2023 05:14), Max: 37.1 (30 Nov 2023 20:21)  T(F): 97.6 (01 Dec 2023 05:14), Max: 98.7 (30 Nov 2023 20:21)  HR: 62 (01 Dec 2023 05:14) (59 - 69)  BP: 134/70 (01 Dec 2023 05:14) (97/58 - 164/62)  BP(mean): --  RR: 18 (01 Dec 2023 05:14) (18 - 18)  SpO2: 97% (01 Dec 2023 05:14) (97% - 99%)    Parameters below as of 01 Dec 2023 05:14  Patient On (Oxygen Delivery Method): room air        ________________________________________________  PHYSICAL EXAM:  GENERAL: NAD  HEENT: Normocephalic;  conjunctivae and sclerae clear; moist mucous membranes;   NECK : supple  CHEST/LUNG: Clear to auscultation bilaterally with good air entry   HEART: S1 S2  regular; no murmurs, gallops or rubs  ABDOMEN: Soft, Nontender, Nondistended; Bowel sounds present  EXTREMITIES: no cyanosis; no edema; no calf tenderness  SKIN: warm and dry; no rash  NERVOUS SYSTEM:  Awake and alert; Oriented  to place, person and time ; no new deficits    _________________________________________________  LABS:              CAPILLARY BLOOD GLUCOSE            RADIOLOGY & ADDITIONAL TESTS:    Imaging  Reviewed:  YES/NO    Consultant(s) Notes Reviewed:   YES/ No      Plan of care was discussed with patient and /or primary care giver; all questions and concerns were addressed  NP Note discussed with  Primary Attending    Patient is a 83y old  Male who presents with a chief complaint of orthostatic hypotension & gait instability (01 Dec 2023 10:30)      INTERVAL HPI/OVERNIGHT EVENTS: no new complaints    MEDICATIONS  (STANDING):  aspirin enteric coated 81 milliGRAM(s) Oral daily  atorvastatin 40 milliGRAM(s) Oral at bedtime  dorzolamide 2% Ophthalmic Solution 1 Drop(s) Both EYES three times a day  droxidopa 100 milliGRAM(s) Oral three times a day  enoxaparin Injectable 40 milliGRAM(s) SubCutaneous every 24 hours  folic acid 1 milliGRAM(s) Oral daily  glycopyrrolate 1 milliGRAM(s) Oral two times a day  latanoprost 0.005% Ophthalmic Solution 1 Drop(s) Both EYES at bedtime  midodrine. 20 milliGRAM(s) Oral three times a day  nystatin Powder 1 Application(s) Topical every 12 hours  pantoprazole    Tablet 40 milliGRAM(s) Oral before breakfast  senna 2 Tablet(s) Oral at bedtime  thiamine 100 milliGRAM(s) Oral daily    MEDICATIONS  (PRN):  melatonin 5 milliGRAM(s) Oral at bedtime PRN Insomnia  ondansetron Injectable 4 milliGRAM(s) IV Push every 8 hours PRN Nausea and/or Vomiting      __________________________________________________  REVIEW OF SYSTEMS:    CONSTITUTIONAL: No fever,   EYES: no acute visual disturbances  NECK: No pain or stiffness  RESPIRATORY: No cough; No shortness of breath  CARDIOVASCULAR: No chest pain, no palpitations  GASTROINTESTINAL: No pain. No nausea or vomiting; No diarrhea   NEUROLOGICAL: No headache or numbness, no tremors  MUSCULOSKELETAL: No joint pain, no muscle pain  GENITOURINARY: no dysuria, no frequency, no hesitancy  PSYCHIATRY: no depression , no anxiety  ALL OTHER  ROS negative        Vital Signs Last 24 Hrs  T(C): 36.4 (01 Dec 2023 05:14), Max: 37.1 (30 Nov 2023 20:21)  T(F): 97.6 (01 Dec 2023 05:14), Max: 98.7 (30 Nov 2023 20:21)  HR: 62 (01 Dec 2023 05:14) (59 - 69)  BP: 134/70 (01 Dec 2023 05:14) (97/58 - 164/62)  BP(mean): --  RR: 18 (01 Dec 2023 05:14) (18 - 18)  SpO2: 97% (01 Dec 2023 05:14) (97% - 99%)    Parameters below as of 01 Dec 2023 05:14  Patient On (Oxygen Delivery Method): room air        ________________________________________________  PHYSICAL EXAM:  GENERAL: NAD  HEENT: Normocephalic;  conjunctivae and sclerae clear; moist mucous membranes;   NECK : supple  CHEST/LUNG: Clear to auscultation bilaterally with good air entry   HEART: S1 S2  regular; no murmurs, gallops or rubs  ABDOMEN: Soft, Nontender, Nondistended; Bowel sounds present  EXTREMITIES: no cyanosis; no edema; no calf tenderness  SKIN: warm and dry; no rash  NERVOUS SYSTEM:  Awake and alert; Oriented  to place, person and time ; no new deficits    _________________________________________________  LABS:              CAPILLARY BLOOD GLUCOSE            RADIOLOGY & ADDITIONAL TESTS:    Imaging  Reviewed:  YES/NO    Consultant(s) Notes Reviewed:   YES/ No      Plan of care was discussed with patient and /or primary care giver; all questions and concerns were addressed

## 2023-12-01 NOTE — PROGRESS NOTE ADULT - NS ATTEND OPT1A GEN_ALL_CORE
History/Exam/Medical decision making
Medical decision making
History/Exam/Medical decision making
Medical decision making

## 2023-12-01 NOTE — PROGRESS NOTE ADULT - PROBLEM SELECTOR PROBLEM 2
Chronic orthostatic hypotension
Cirrhosis
Chronic orthostatic hypotension
Cirrhosis
Cirrhosis
Pneumonia, aspiration
Cirrhosis
Pneumonia, aspiration
Cirrhosis
Cirrhosis

## 2023-12-01 NOTE — PROGRESS NOTE ADULT - NS ATTEND AMEND GEN_ALL_CORE FT
Patient seen at bedside, states he feels fine, denies dizziness.  VS reviewed, sBP fluctuating but no significant hyper/hypotension noted.  On exam, patient is AOx3, NAD, cardiopulmonary exams unremarkable, abdomen soft, NT/ND, extremities with mild edema.   No labs available for review today.     Assessment and plan:  83yo M with HTN, HLD, prior strokes, orthostatic hypotension, cirrhosis with recent paracentesis in 10/2023, p/w 2-week history of worsening dizziness on standing with low BP and chronically worsening bilaterally blurry vision with presentation concerning for Lewy Body dementia and dysautonomia. Found to have L sided opacity on CXR, treated for PNA.  At this time, course most concerning for profound orthostatic hypotension that has proved to be very difficult to control. Continuing to titrate medications, now on max dose of midodrine and droxidopa, however continues to have episodes of severe orthostatic hypotension with dizziness. Given inability to control symptoms and DC patient, involving Palliative Care on 11/27 to assist with conversations around disease trajectory.  Now patient is deemed medically stable for discharge, awaiting  for safe discharge plan.    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    - neurology recs appreciated  - patient was previously on three agent including fludrocortisone for the orthostatic hypotension, but it was discontinued given the ineffectiveness and high BP at rest combined with hypokalemia  - BP at rest improving after decreasing droxidopa 200mg ->100mg TID  - PT recs appreciated, home PT recommended   - appreciate palliative care input 11/27, pall care SW involved, appreciate the assistance with dispo plan  - Lasix discontinued on 11/23 given the persistent orthostatic hypotension    # Pneumonia, likely 2/2 aspiration, resolved    - CXR 11/15 with L sided opacity, possibly 2/2 aspiration iso chronic dysphagia as below    - s/p 5-day course of CTX    # Cirrhosis, 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 Daughter reports all the workup was negative as the etiology of cirrhosis    - completed paracentesis on 11/22    # Hx of stroke    # Chronic dysphagia, gait disturbance    - Found to have stroke in 2022, and it was deemed the cause of his dysphagia, gait disturbance    - Brain MRI only notable for chronic cerebral atrophy   - Continue home aspirin, statin      # DVT ppx    - Lovenox

## 2023-12-01 NOTE — PROGRESS NOTE ADULT - PROBLEM SELECTOR PLAN 11
c/w ace bandage on LE - orthostatics improved  once optimized PT rec home PT  palliative care onboard

## 2023-12-01 NOTE — DISCHARGE NOTE NURSING/CASE MANAGEMENT/SOCIAL WORK - NSDCPEFALRISK_GEN_ALL_CORE
For information on Fall & Injury Prevention, visit: [email or web address]/[email or web address]-[email or web address]-maintains-health-[email or web address]-mobility OR  [email or web address]://[email or web address]/[email or web address]-prevention-tips-[email or web address]-avoid-injury OR  https://www.cdc.gov/steadi/patient.html For information on Fall & Injury Prevention, visit: [email or web address]://[email or web address].[email or web address]/[email or web address]-prevention-protects-[email or web address]-maintains-health-[email or web address]-mobility OR  https://www.[email or web address]/news/fall-prevention-tips-to-avoid-injury OR  https://www.cdc.gov/steadi/patient.html For information on Fall & Injury Prevention, visit: [email or web address]://www.[facility]/news/fall-prevention-protects-and-maintains-health-and-mobility OR  https://www.[facility]/news/fall-prevention-tips-to-avoid-injury OR  https://www.cdc.gov/steadi/patient.html

## 2023-12-01 NOTE — DISCHARGE NOTE NURSING/CASE MANAGEMENT/SOCIAL WORK - CASE MANAGER'S NAME
Alee Chaney [facility]  Alee Chaney [facility]  Alee Chaney [facility]

## 2023-12-02 NOTE — PROGRESS NOTE ADULT - SUBJECTIVE AND OBJECTIVE BOX
[facility] Medicine  Patient is a 83y old  Male who presents with a chief complaint of orthostatic hypotension & gait instability (01 Dec 2023 08:13)      SUBJECTIVE / OVERNIGHT EVENTS:  No acute events over night.   Patient states he feels good, no dizziness when sitting in bed.    MEDICATIONS  (STANDING):  aspirin enteric coated 81 milliGRAM(s) Oral daily  atorvastatin 40 milliGRAM(s) Oral at bedtime  dorzolamide 2% Ophthalmic Solution 1 Drop(s) Both EYES three times a day  droxidopa 100 milliGRAM(s) Oral three times a day  enoxaparin Injectable 40 milliGRAM(s) SubCutaneous every 24 hours  folic acid 1 milliGRAM(s) Oral daily  glycopyrrolate 1 milliGRAM(s) Oral two times a day  latanoprost 0.005% Ophthalmic Solution 1 Drop(s) Both EYES at bedtime  midodrine. 20 milliGRAM(s) Oral three times a day  nystatin Powder 1 Application(s) Topical every 12 hours  pantoprazole    Tablet 40 milliGRAM(s) Oral before breakfast  senna 2 Tablet(s) Oral at bedtime  thiamine 100 milliGRAM(s) Oral daily    MEDICATIONS  (PRN):  melatonin 5 milliGRAM(s) Oral at bedtime PRN Insomnia  ondansetron Injectable 4 milliGRAM(s) IV Push every 8 hours PRN Nausea and/or Vomiting          OBJECTIVE:  Vital Signs Last 24 Hrs  T(C): 36.3 (02 Dec 2023 11:56), Max: 37.6 (01 Dec 2023 20:30)  T(F): 97.3 (02 Dec 2023 11:56), Max: 99.6 (01 Dec 2023 20:30)  HR: 68 (02 Dec 2023 11:56) (62 - 84)  BP: 138/67 (02 Dec 2023 05:00) (138/67 - 180/77)  BP(mean): --  RR: 18 (02 Dec 2023 11:56) (17 - 18)  SpO2: 96% (02 Dec 2023 11:56) (94% - 96%)    Parameters below as of 02 Dec 2023 11:56  Patient On (Oxygen Delivery Method): room air        PHYSICAL EXAM:  GENERAL: NAD  HEAD:  Atraumatic, Normocephalic  EYES: conjunctiva and sclera clear  NECK: Supple, No JVD  CHEST/LUNG: Clear to auscultation bilaterally; No wheeze  HEART: Regular rate and rhythm; No murmurs, rubs, or gallops  ABDOMEN: Soft, Nontender, Nondistended; Bowel sounds present  EXTREMITIES:  No clubbing, cyanosis, or edema  PSYCH: AAOx3  NEUROLOGY: non-focal  SKIN: No rashes or lesions      CAPILLARY BLOOD GLUCOSE        I&O's Summary    01 Dec 2023 07:01  -  02 Dec 2023 07:00  --------------------------------------------------------  IN: 0 mL / OUT: 875 mL / NET: -875 mL              LABS:                        RADIOLOGY & ADDITIONAL TESTS:

## 2023-12-02 NOTE — PROGRESS NOTE ADULT - ASSESSMENT
83yo M with HTN, HLD, prior strokes, orthostatic hypotension, cirrhosis with recent paracentesis in 10/2023, p/w 2-week history of worsening dizziness on standing with low BP and chronically worsening bilaterally blurry vision with presentation concerning for Lewy Body dementia and dysautonomia. Found to have L sided opacity on CXR, treated for PNA.  At this time, course most concerning for profound orthostatic hypotension that has proved to be very difficult to control. Continuing to titrate medications, now on max dose of midodrine and droxidopa, however continues to have episodes of severe orthostatic hypotension with dizziness. Given inability to control symptoms and DC patient, involving Palliative Care on 11/27 to assist with conversations around disease trajectory.  Now patient is deemed medically stable for discharge, awaiting SW/KIMBERLI for safe discharge plan.    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    - neurology recs appreciated  - patient was previously on three agent including fludrocortisone for the orthostatic hypotension, but it was discontinued given the ineffectiveness and high BP at rest combined with hypokalemia  - BP at rest improving after decreasing droxidopa 200mg ->100mg TID  - PT recs appreciated, home PT recommended   - appreciate palliative care input 11/27, pall care SW involved, appreciate the assistance with dispo plan  - Lasix discontinued on 11/23 given the persistent orthostatic hypotension    # Pneumonia, likely 2/2 aspiration, resolved    - CXR 11/15 with L sided opacity, possibly 2/2 aspiration iso chronic dysphagia as below    - s/p 5-day course of CTX    # Cirrhosis, 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 Daughter reports all the workup was negative as the etiology of cirrhosis    - completed paracentesis on 11/22    # Hx of stroke    # Chronic dysphagia, gait disturbance    - Found to have stroke in 2022, and it was deemed the cause of his dysphagia, gait disturbance    - Brain MRI only notable for chronic cerebral atrophy   - Continue home aspirin, statin      # DVT ppx    - Lovenox.

## 2023-12-03 NOTE — PROGRESS NOTE ADULT - SUBJECTIVE AND OBJECTIVE BOX
[facility] Medicine  Patient is a 83y old  Male who presents with a chief complaint of orthostatic hypotension & gait instability (02 Dec 2023 14:11)      SUBJECTIVE / OVERNIGHT EVENTS:  Patient was noted to be hypertensive to sBP 190 at rest, droxidopa dose decreased to 100 TID->BID.  Patient states that he feels good, denies recurrence of dizziness.    MEDICATIONS  (STANDING):  aspirin enteric coated 81 milliGRAM(s) Oral daily  atorvastatin 40 milliGRAM(s) Oral at bedtime  dorzolamide 2% Ophthalmic Solution 1 Drop(s) Both EYES three times a day  droxidopa 100 milliGRAM(s) Oral <User Schedule>  enoxaparin Injectable 40 milliGRAM(s) SubCutaneous every 24 hours  folic acid 1 milliGRAM(s) Oral daily  glycopyrrolate 1 milliGRAM(s) Oral two times a day  latanoprost 0.005% Ophthalmic Solution 1 Drop(s) Both EYES at bedtime  midodrine. 20 milliGRAM(s) Oral three times a day  nystatin Powder 1 Application(s) Topical every 12 hours  pantoprazole    Tablet 40 milliGRAM(s) Oral before breakfast  senna 2 Tablet(s) Oral at bedtime  thiamine 100 milliGRAM(s) Oral daily    MEDICATIONS  (PRN):  melatonin 5 milliGRAM(s) Oral at bedtime PRN Insomnia  ondansetron Injectable 4 milliGRAM(s) IV Push every 8 hours PRN Nausea and/or Vomiting          OBJECTIVE:  Vital Signs Last 24 Hrs  T(C): 36.8 (03 Dec 2023 12:21), Max: 36.8 (03 Dec 2023 12:21)  T(F): 98.3 (03 Dec 2023 12:21), Max: 98.3 (03 Dec 2023 12:21)  HR: 62 (03 Dec 2023 12:21) (57 - 66)  BP: 181/80 (03 Dec 2023 12:21) (123/58 - 190/81)  BP(mean): 62 (03 Dec 2023 12:21) (62 - 95)  RR: 18 (03 Dec 2023 12:21) (18 - 18)  SpO2: 96% (03 Dec 2023 12:21) (96% - 99%)    Parameters below as of 03 Dec 2023 12:21  Patient On (Oxygen Delivery Method): room air        PHYSICAL EXAM:  GENERAL: NAD  HEAD:  Atraumatic, Normocephalic  EYES: conjunctiva and sclera clear  NECK: Supple, No JVD  CHEST/LUNG: Clear to auscultation bilaterally; No wheeze  HEART: Regular rate and rhythm; No murmurs, rubs, or gallops  ABDOMEN: Soft, Nontender, Nondistended; Bowel sounds present  EXTREMITIES:  No clubbing, cyanosis, or edema  PSYCH: AAOx3  NEUROLOGY: non-focal  SKIN: No rashes or lesions      CAPILLARY BLOOD GLUCOSE        I&O's Summary            LABS:                        RADIOLOGY & ADDITIONAL TESTS:       [facility] Medicine  Patient is a 83y old  Male who presents with a chief complaint of orthostatic hypotension & gait instability (02 Dec 2023 14:11)      SUBJECTIVE / OVERNIGHT EVENTS:  Patient was noted to be hypertensive to sBP 190 at rest, droxidopa dose decreased to 100 TID->BID.  Patient states that he feels good, denies recurrence of dizziness.    MEDICATIONS  (STANDING):  aspirin enteric coated 81 milliGRAM(s) Oral daily  atorvastatin 40 milliGRAM(s) Oral at bedtime  dorzolamide 2% Ophthalmic Solution 1 Drop(s) Both EYES three times a day  droxidopa 100 milliGRAM(s) Oral <User Schedule>  enoxaparin Injectable 40 milliGRAM(s) SubCutaneous every 24 hours  folic acid 1 milliGRAM(s) Oral daily  glycopyrrolate 1 milliGRAM(s) Oral two times a day  latanoprost 0.005% Ophthalmic Solution 1 Drop(s) Both EYES at bedtime  midodrine. 20 milliGRAM(s) Oral three times a day  nystatin Powder 1 Application(s) Topical every 12 hours  pantoprazole    Tablet 40 milliGRAM(s) Oral before breakfast  senna 2 Tablet(s) Oral at bedtime  thiamine 100 milliGRAM(s) Oral daily    MEDICATIONS  (PRN):  melatonin 5 milliGRAM(s) Oral at bedtime PRN Insomnia  ondansetron Injectable 4 milliGRAM(s) IV Push every 8 hours PRN Nausea and/or Vomiting          OBJECTIVE:  Vital Signs Last 24 Hrs  T(C): 36.8 (03 Dec 2023 12:21), Max: 36.8 (03 Dec 2023 12:21)  T(F): 98.3 (03 Dec 2023 12:21), Max: 98.3 (03 Dec 2023 12:21)  HR: 62 (03 Dec 2023 12:21) (57 - 66)  BP: 181/80 (03 Dec 2023 12:21) (123/58 - 190/81)  BP(mean): 62 (03 Dec 2023 12:21) (62 - 95)  RR: 18 (03 Dec 2023 12:21) (18 - 18)  SpO2: 96% (03 Dec 2023 12:21) (96% - 99%)    Parameters below as of 03 Dec 2023 12:21  Patient On (Oxygen Delivery Method): room air        PHYSICAL EXAM:  GENERAL: NAD  HEAD:  Atraumatic, Normocephalic  EYES: conjunctiva and sclera clear  NECK: Supple, No JVD  CHEST/LUNG: Clear to auscultation bilaterally; No wheeze  HEART: Regular rate and rhythm; No murmurs, rubs, or gallops  ABDOMEN: Soft, Nontender, Nondistended; Bowel sounds present  EXTREMITIES:  No clubbing, cyanosis, or edema  PSYCH: AAOx3  NEUROLOGY: non-focal  SKIN: No rashes or lesions      CAPILLARY BLOOD GLUCOSE        I&O's Summary            LABS:                        RADIOLOGY & ADDITIONAL TESTS:

## 2023-12-03 NOTE — PROGRESS NOTE ADULT - REASON FOR ADMISSION
orthostatic hypotension & gait instability

## 2023-12-03 NOTE — PROGRESS NOTE ADULT - ASSESSMENT
81yo M with HTN, HLD, prior strokes, orthostatic hypotension, cirrhosis with recent paracentesis in 10/2023, p/w 2-week history of worsening dizziness on standing with low BP and chronically worsening bilaterally blurry vision with presentation concerning for Lewy Body dementia and dysautonomia. Found to have L sided opacity on CXR, treated for PNA.  At this time, course most concerning for profound orthostatic hypotension that has proved to be very difficult to control. Continuing to titrate medications, now on max dose of midodrine and droxidopa, however continues to have episodes of severe orthostatic hypotension with dizziness. Given inability to control symptoms and DC patient, involving Palliative Care on 11/27 to assist with conversations around disease trajectory.  Now patient is deemed medically stable for discharge, awaiting SW/CM for safe discharge plan.    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 Daughter reports all the workup was negative as the etiology of cirrhosis    - completed paracentesis on 11/22    # Hx of stroke    # Chronic dysphagia, gait disturbance    - Found to have stroke in 2022, and it was deemed the cause of his dysphagia, gait disturbance    - Brain MRI only notable for chronic cerebral atrophy   - Continue home aspirin, statin      # DVT ppx    - Lovenox.

## 2023-12-03 NOTE — PROGRESS NOTE ADULT - PROVIDER SPECIALTY LIST ADULT
Hospitalist
Hospitalist
Internal Medicine
Hospitalist
Internal Medicine
Neurology
Neurology
Palliative Care
Palliative Care
Hospitalist
Internal Medicine

## 2023-12-11 PROBLEM — K74.60 UNSPECIFIED CIRRHOSIS OF LIVER: Chronic | Status: ACTIVE | Noted: 2023-01-01

## 2023-12-14 PROBLEM — G31.83 LEWY BODY DEMENTIA: Status: RESOLVED | Noted: 2023-01-01 | Resolved: 2023-01-01

## 2023-12-14 PROBLEM — R13.10 DYSPHAGIA: Status: ACTIVE | Noted: 2023-01-01

## 2023-12-14 PROBLEM — R77.0 LOW SERUM ALBUMIN: Status: ACTIVE | Noted: 2023-01-01

## 2023-12-14 NOTE — LETTER HEADER
[Care Plan reviewed and provided to patient/caregiver] : Care plan reviewed and provided to patient/caregiver [Care Plan reviewed every ___ weeks] : Care plan reviewed every [unfilled] weeks [Initiation or substantial revisions made to care plan involving mod/high medical decision making for complex CCM] : Initiation or substantial revisions made to care plan involving mod/high medical decision making for complex CCM [Patient/Caregiver agrees to have other providers send summary of their care to this office] : Patient/caregiver agrees to have other providers send summary of their care to this office

## 2023-12-14 NOTE — HISTORY OF PRESENT ILLNESS
[A] : A [Patient is stable - had PCP appoinment] : patient is stable - had PCP appointment [In-Place] : has Home Health services in-place [PT] : PT [Patient] : patient [Family Member] : family member [FreeTextEntry4] : Hepatologist [FreeTextEntry1] : 10 hours/ 5 days a week Jose BRANTLEY  [FreeTextEntry2] : JE AKERS is a 83 year male with PMH of  with HTN, HLD, prior strokes, orthostatics hypotension, and Dementia here to review recent evaluation for cirrhosis.  BP has been running low, systolic in the 90s. He is currently on Midodrine. Fibroscan test 4/11/23 showed F2 (7.6 kPa), S3 ().  Interval Events Patient is being seen for Initial visit to enroll in house calls Program. Patient alert and oriented x 3 with period of forgetfulness. Patient needs help with all daily activities. use a rollator with steady gait.  Patient denies any chest pain, fever, cough, SOB, denies any resp distress.  Abdomen distended with + ascites, Patient was tap during last hospitalization 2.L was removed. Patient denies any discomfort at this time. [facility] Palliative Dr Aj Navarro [phone], [phone] daughter to call to follow up enrollment in palliative. Dysphagia pureed diet with thicken liquid. Rollator, Shower Chair, Reclined couch,  Subjective: -Appetite/weight: Appetite good, Weight stable -Gait/falls: No Falls, Gait unsteady -Pain: No Pain -Sleep:  sleep well, naps in daytime -Urine: No issues GI: BM 3x a week -Skin: intact -DME: Shower Chair, Rollator -Mood/memory: Mood Fair with period anxiety, Pt stated unable to get better with sickness." -Communication: Conversational -Health Maintenance:  Covid x3, Flu 10/2023, Declined covid booster, No Shingles 10hours 5 days a week -[facility]- December 4 Discharge       82 yo M with HTN, HLD, prior strokes, orthostatic hypotension, cirrhosis with recent paracentesis in 10/2023, p/w 2-week history of worsening dizziness on standing with low BP and chronically worsening bilaterally blurry vision with presentation concerning for Lewy Body dementia and dysautonomia. Found to have L sided opacity on CXR, successfully treated with 5-day course of antibiotics. He was also found to have ascites 2/2 cirrhosis, underwent therapeutic paracentesis 11/20 with 2L removal.  Medical Issues: -Orthostatic Hypotension - On Midodrine and Droxidopa -HLD-On Atorvastatin, aspirin -Cirrhosis with Ascites- On Lasix Cataract- On Latanoprost  Specialists: Dr Aziza Zapata HEP    Social hx: Caregivers: have AIDE  MOLST: DNI/DNR, no feeding tube, limited medical intervention, send to hospital. Completed 11/27/2023. HCP: Daughter Viji [phone] No PCP  Cardiology Dr Obando  last visit  Prescribe Droxidopa 100mg and is authorize at this time.

## 2023-12-14 NOTE — PHYSICAL EXAM
[No Acute Distress] : no acute distress [Normal Sclera/Conjunctiva] : normal sclera/conjunctiva [Normal Outer Ear/Nose] : the ears and nose were normal in appearance [No JVD] : no jugular venous distention [Supple] : the neck was supple [No Respiratory Distress] : no respiratory distress [Clear to Auscultation] : lungs were clear to auscultation bilaterally [Normal Rate] : heart rate was normal  [Regular Rhythm] : with a regular rhythm [Normal Bowel Sounds] : normal bowel sounds [Soft] : abdomen soft [No CVA Tenderness] : no ~M costovertebral angle tenderness [No Spinal Tenderness] : no spinal tenderness [Oriented x3] : oriented to person, place, and time [de-identified] : distended Cirrhosis [de-identified] : use Rollator, SANDEEP legs +3 edema [de-identified] : Right Hand with ecchymosis area noted [de-identified] : with forgetness

## 2023-12-14 NOTE — COUNSELING
[Normal Weight - ( BMI  <25 )] : normal weight - ( BMI  <25 ) [Non - Smoker] : non-smoker [Smoke/CO Detectors] : smoke/CO detectors [Use grab bars] : use grab bars [Use assistive device to avoid falls] : use assistive device to avoid falls [Remove clutter and unsafe carpeting to avoid falls] : remove clutter and unsafe carpeting to avoid falls [DNR] : Code Status: DNR [ - New patient with 2 or more chronic conditions; CCM discussed and patient-centered care plan established] : New patient with 2 or more chronic conditions; CCM discussed and patient-centered care plan established [Continue diet as tolerated] : continue diet as tolerated based on goals of care [] : foot exam [Completed] : Aspirin use discussion completed [DNI] : Intubation: DNI [[facility] Date: _____] [facility]/last verification date: [unfilled] [FreeTextEntry3] : Purred with thicken liquid

## 2023-12-14 NOTE — HEALTH RISK ASSESSMENT
[Independent] : feeding [Some assistance needed] : using telephone [Full assistance needed] : managing finances [No falls in past year] : Patient reported no falls in the past year [TimeGetUpGo] : 30

## 2023-12-14 NOTE — REVIEW OF SYSTEMS
[As Noted in HPI] : as noted in HPI [Muscle Weakness] : muscle weakness [Joint Swelling] : joint swelling [Skin Rash] : skin rash [Negative] : Psychiatric [FreeTextEntry7] : Abdomen distended [FreeTextEntry9] : use Rollator, Jd Legs with # edema [de-identified] : Right hand ecchymosis noted. [de-identified] : forgetful [de-identified] : Cirrhosis

## 2023-12-14 NOTE — REASON FOR VISIT
[Initial Evaluation] : an initial evaluation [Family Member] : family member [Pre-Visit Preparation] : pre-visit preparation was done [Intercurrent Specialty/Sub-specialty Visits] : the patient has intercurrent specialty/sub-specialty visits [FreeTextEntry1] : HTN, HLD, prior strokes, orthostatics hypotension, and Dementia here to review recent evaluation for cirrhosis. Since he was last seen, his BP has been running low, systolic in the 90s. He is currently on Midodrine and no longer taking furosemide. Fibroscan test 4/11/23 showed F2 (7.6 kPa), S3 (). [FreeTextEntry2] : Chart Reviwed [FreeTextEntry3] : Hepatologist

## 2024-01-01 ENCOUNTER — NON-APPOINTMENT (OUTPATIENT)
Age: 84
End: 2024-01-01

## 2024-01-01 ENCOUNTER — APPOINTMENT (OUTPATIENT)
Dept: HOME HEALTH SERVICES | Facility: HOME HEALTH | Age: 84
End: 2024-01-01

## 2024-01-01 ENCOUNTER — TRANSCRIPTION ENCOUNTER (OUTPATIENT)
Age: 84
End: 2024-01-01

## 2024-01-01 ENCOUNTER — APPOINTMENT (OUTPATIENT)
Dept: HOME HEALTH SERVICES | Facility: HOME HEALTH | Age: 84
End: 2024-01-01
Payer: MEDICARE

## 2024-01-01 ENCOUNTER — APPOINTMENT (OUTPATIENT)
Dept: NEUROLOGY | Facility: CLINIC | Age: 84
End: 2024-01-01

## 2024-01-01 ENCOUNTER — LABORATORY RESULT (OUTPATIENT)
Age: 84
End: 2024-01-01

## 2024-01-01 VITALS
DIASTOLIC BLOOD PRESSURE: 60 MMHG | TEMPERATURE: 97.88 F | OXYGEN SATURATION: 98 % | SYSTOLIC BLOOD PRESSURE: 110 MMHG | RESPIRATION RATE: 16 BRPM | HEART RATE: 70 BPM

## 2024-01-01 VITALS
TEMPERATURE: 207.14 F | RESPIRATION RATE: 17 BRPM | SYSTOLIC BLOOD PRESSURE: 116 MMHG | OXYGEN SATURATION: 99 % | DIASTOLIC BLOOD PRESSURE: 68 MMHG | HEART RATE: 70 BPM

## 2024-01-01 VITALS
DIASTOLIC BLOOD PRESSURE: 67 MMHG | TEMPERATURE: 209.84 F | RESPIRATION RATE: 17 BRPM | HEART RATE: 72 BPM | SYSTOLIC BLOOD PRESSURE: 109 MMHG | OXYGEN SATURATION: 99 %

## 2024-01-01 DIAGNOSIS — Q15.9 CONGENITAL MALFORMATION OF EYE, UNSPECIFIED: ICD-10-CM
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
## 2024-01-01 DIAGNOSIS — G23.8 MULTI-SYSTEM DEGENERATION OF THE AUTONOMIC NERVOUS SYSTEM: ICD-10-CM

## 2024-01-01 DIAGNOSIS — E78.5 HYPERLIPIDEMIA, UNSPECIFIED: ICD-10-CM

## 2024-01-01 DIAGNOSIS — T14.8XXA OTHER INJURY OF UNSPECIFIED BODY REGION, INITIAL ENCOUNTER: ICD-10-CM

## 2024-01-01 DIAGNOSIS — S41.112A LACERATION W/OUT FOREIGN BODY OF LEFT UPPER ARM, INITIAL ENCOUNTER: ICD-10-CM

## 2024-01-01 DIAGNOSIS — R60.0 LOCALIZED EDEMA: ICD-10-CM

## 2024-01-01 DIAGNOSIS — R18.8 OTHER ASCITES: ICD-10-CM

## 2024-01-01 DIAGNOSIS — I95.1 ORTHOSTATIC HYPOTENSION: ICD-10-CM

## 2024-01-01 DIAGNOSIS — K74.60 UNSPECIFIED CIRRHOSIS OF LIVER: ICD-10-CM

## 2024-01-01 DIAGNOSIS — F51.04 PSYCHOPHYSIOLOGIC INSOMNIA: ICD-10-CM

## 2024-01-01 DIAGNOSIS — R41.82 ALTERED MENTAL STATUS, UNSPECIFIED: ICD-10-CM

## 2024-01-01 DIAGNOSIS — G90.3 MULTI-SYSTEM DEGENERATION OF THE AUTONOMIC NERVOUS SYSTEM: ICD-10-CM

## 2024-01-01 LAB
ALBUMIN SERPL ELPH-MCNC: 2.2 G/DL
ALP BLD-CCNC: 80 U/L
ALT SERPL-CCNC: 11 U/L
ANION GAP SERPL CALC-SCNC: 10 MMOL/L
AST SERPL-CCNC: 19 U/L
BILIRUB SERPL-MCNC: 0.5 MG/DL
BUN SERPL-MCNC: 17 MG/DL
CALCIUM SERPL-MCNC: 8.1 MG/DL
CHLORIDE SERPL-SCNC: 101 MMOL/L
CO2 SERPL-SCNC: 28 MMOL/L
CREAT SERPL-MCNC: 1.32 MG/DL
EGFR: 53 ML/MIN/1.73M2
GLUCOSE SERPL-MCNC: 105 MG/DL
HCT VFR BLD CALC: 28.6 %
HGB BLD-MCNC: 9.6 G/DL
MCHC RBC-ENTMCNC: 33.6 GM/DL
MCHC RBC-ENTMCNC: 34.2 PG
MCV RBC AUTO: 101.8 FL
PLATELET # BLD AUTO: 130 K/UL
POTASSIUM SERPL-SCNC: 3.5 MMOL/L
PROT SERPL-MCNC: 6.6 G/DL
RBC # BLD: 2.81 M/UL
RBC # FLD: 15.1 %
SODIUM SERPL-SCNC: 138 MMOL/L
WBC # FLD AUTO: 5.6 K/UL

## 2024-01-01 PROCEDURE — 99348 HOME/RES VST EST LOW MDM 30: CPT

## 2024-01-01 PROCEDURE — 99349 HOME/RES VST EST MOD MDM 40: CPT

## 2024-01-01 RX ORDER — CYANOCOBALAMIN (VITAMIN B-12) 1000 MCG
100 TABLET, EXTENDED RELEASE ORAL DAILY
Refills: 0 | Status: ACTIVE | COMMUNITY

## 2024-01-01 RX ORDER — SENNOSIDES 8.6 MG TABLETS 8.6 MG/1
8.6 TABLET ORAL
Qty: 60 | Refills: 5 | Status: ACTIVE | COMMUNITY
Start: 2023-01-01

## 2024-01-01 RX ORDER — LATANOPROST/PF 0.005 %
0.01 DROPS OPHTHALMIC (EYE)
Refills: 0 | Status: ACTIVE | COMMUNITY

## 2024-01-01 RX ORDER — FUROSEMIDE 20 MG/1
20 TABLET ORAL DAILY
Qty: 30 | Refills: 1 | Status: COMPLETED | COMMUNITY
End: 2024-01-01

## 2024-01-01 RX ORDER — DORZOLAMIDE HYDROCHLORIDE 20 MG/ML
2 SOLUTION OPHTHALMIC TWICE DAILY
Refills: 0 | Status: ACTIVE | COMMUNITY

## 2024-01-01 RX ORDER — MIDODRINE HYDROCHLORIDE 10 MG/1
10 TABLET ORAL 3 TIMES DAILY
Refills: 0 | Status: ACTIVE | COMMUNITY

## 2024-01-01 RX ORDER — ASPIRIN 81 MG/1
81 TABLET, CHEWABLE ORAL
Refills: 0 | Status: ACTIVE | COMMUNITY

## 2024-01-01 RX ORDER — TRAZODONE HYDROCHLORIDE 50 MG/1
50 TABLET ORAL
Qty: 60 | Refills: 0 | Status: ACTIVE | COMMUNITY
Start: 2024-01-01

## 2024-01-01 RX ORDER — GLYCOPYRROLATE 1 MG/1
1 TABLET ORAL
Qty: 90 | Refills: 3 | Status: ACTIVE | COMMUNITY
Start: 2023-01-01

## 2024-01-01 RX ORDER — TORSEMIDE 20 MG/1
20 TABLET ORAL
Qty: 30 | Refills: 3 | Status: ACTIVE | COMMUNITY
Start: 2024-01-01

## 2024-01-01 RX ORDER — MUPIROCIN 20 MG/G
2 OINTMENT TOPICAL
Qty: 1 | Refills: 5 | Status: ACTIVE | COMMUNITY
Start: 2024-01-01

## 2024-01-01 RX ORDER — FOLIC ACID 1 MG/1
1 TABLET ORAL
Qty: 90 | Refills: 3 | Status: ACTIVE | COMMUNITY

## 2024-01-01 RX ORDER — ATORVASTATIN CALCIUM 40 MG/1
40 TABLET, FILM COATED ORAL
Qty: 1 | Refills: 3 | Status: ACTIVE | COMMUNITY

## 2024-01-01 NOTE — PROGRESS NOTE ADULT - ASSESSMENT
[de-identified] : crying, fussiness 81yo M pmhx of orthostatics Hypotension , Edema , HLD comes to ED w/ slurred speech, difficulty swallowing,  worsening over the past month.   overall sx started one month ago and pt was seen by neuro Dr. Navya Roblero..MRI per daughter showed "multiple strokes "   he was seen and worked up by cardio inclduing echo and 24 hr holter but jarquin "was negative "    Denies chest pain, SOB, fall, trauma, n/v/d, sick contacts, urinary symptoms.  reports cough to solids   has difficulty swallowing but Patient denies hx of CHF however takes lasix regularly for peripheral edema?     - Slurred speech/ dysphagia:  neuro checks   neuro consult   monitor BP ( elevated in ED )   cardio input     - dysphagia : s/s     - cirrhosis on CT :US noted   pt with hx of cirrhosis     - edema:? sec t chf +/- florinef   lasix ffor now   fu orthostatics      [FreeTextEntry6] : 2 month old baby 

## 2024-01-25 PROBLEM — R60.0 BILATERAL LOWER EXTREMITY EDEMA: Status: ACTIVE | Noted: 2024-01-01

## 2024-02-01 PROBLEM — G90.3 MULTIPLE SYSTEM ATROPHY: Status: ACTIVE | Noted: 2023-01-01

## 2024-02-01 PROBLEM — T14.8XXA WOUND OF SKIN: Status: ACTIVE | Noted: 2024-01-01
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
## 2024-02-05 PROBLEM — F51.04 CHRONIC INSOMNIA: Status: ACTIVE | Noted: 2024-01-01

## 2024-02-16 PROBLEM — I95.1 CHRONIC ORTHOSTATIC HYPOTENSION: Status: ACTIVE | Noted: 2023-01-01

## 2024-02-16 PROBLEM — Q15.9 EYE ABNORMALITY: Status: ACTIVE | Noted: 2024-01-01

## 2024-02-16 PROBLEM — S41.112A SKIN TEAR OF LEFT UPPER EXTREMITY: Status: ACTIVE | Noted: 2024-01-01

## 2024-02-16 PROBLEM — E78.5 HYPERLIPIDEMIA, UNSPECIFIED HYPERLIPIDEMIA TYPE: Status: ACTIVE | Noted: 2022-11-28

## 2024-02-16 PROBLEM — R18.8 ASCITES: Status: ACTIVE | Noted: 2023-01-01

## 2024-02-16 PROBLEM — K74.60 CIRRHOSIS OF LIVER: Status: ACTIVE | Noted: 2023-03-15

## 2024-02-16 NOTE — COUNSELING
[Normal Weight - ( BMI  <25 )] : normal weight - ( BMI  <25 ) [Continue diet as tolerated] : continue diet as tolerated based on goals of care [Non - Smoker] : non-smoker [Smoke/CO Detectors] : smoke/CO detectors [Use grab bars] : use grab bars [Use assistive device to avoid falls] : use assistive device to avoid falls [Remove clutter and unsafe carpeting to avoid falls] : remove clutter and unsafe carpeting to avoid falls [] : foot exam [Not Recommended] : Aspirin use not recommended due to overall prognosis [Improve mobility] : improve mobility [Decrease stress] : decrease stress [Decrease hospital use] : decrease hospital use [DNR] : Code Status: DNR [DNI] : Intubation: DNI [[facility]: _____] [facility]/last verification date: [unfilled] [FreeTextEntry3] : Purred with thicken liquid

## 2024-02-16 NOTE — REVIEW OF SYSTEMS
[Muscle Weakness] : muscle weakness [Skin Rash] : skin rash [Negative] : Psychiatric [FreeTextEntry7] : Abdomen distended [FreeTextEntry9] : Rollator use [de-identified] : Right hand ecchymosis noted. left hand dressing, Right face ecchymotic area and skin tear [de-identified] : forgetful [de-identified] : Cirrhosis / ascites

## 2024-02-16 NOTE — HEALTH RISK ASSESSMENT
[Independent] : feeding [Some assistance needed] : using telephone [Full assistance needed] : managing finances [One fall no injury in past year] : Patient reported one fall in the past year without injury [de-identified] : complete care at this time

## 2024-02-16 NOTE — REASON FOR VISIT
[Follow-Up] : a follow-up visit [Pre-Visit Preparation] : pre-visit preparation was done [Intercurrent Specialty/Sub-specialty Visits] : the patient has intercurrent specialty/sub-specialty visits [Acute] : an acute visit [FreeTextEntry2] : Chart reviewed [FreeTextEntry3] : [facility]

## 2024-02-16 NOTE — PHYSICAL EXAM
[No Acute Distress] : no acute distress [Normal Voice/Communication] : normal voice communication [Normal Sclera/Conjunctiva] : normal sclera/conjunctiva [Normal Outer Ear/Nose] : the ears and nose were normal in appearance [No JVD] : no jugular venous distention [Supple] : the neck was supple [No Respiratory Distress] : no respiratory distress [Clear to Auscultation] : lungs were clear to auscultation bilaterally [Normal Rate] : heart rate was normal  [Regular Rhythm] : with a regular rhythm [Normal Bowel Sounds] : normal bowel sounds [Soft] : abdomen soft [No CVA Tenderness] : no ~M costovertebral angle tenderness [de-identified] : distended Ascites [de-identified] : use Rollator, No Edema noted at this time. [de-identified] : Right hand ecchymosis noted. left hand dressing, Right face ecchymotic area and skin tear [de-identified] : with forgetness

## 2024-02-16 NOTE — HISTORY OF PRESENT ILLNESS
[In-Place] : has Home Health services in-place [PT] : PT [A] : A [Patient is stable - had PCP appoinment] : patient is stable - had PCP appointment [Patient] : patient [Family Member] : family member [FreeTextEntry4] : Hepatologist [FreeTextEntry1] : Hospice patient, Dementia w/ Lewy Body  [FreeTextEntry2] : JE AKERS is a 83 year male with PMH of  with HTN, HLD, prior strokes, orthostatics hypotension, and Dementia here to review recent evaluation for cirrhosis.  BP has been running low, systolic in the 90s. He is currently on Midodrine. Fibroscan test 4/11/23 showed F2 (7.6 kPa), S3 ().  Interval Events Patient is being seen for acute visit.  Patient alert and oriented x 3 with period of forgetfulness. Patient needs help with all daily activities. use a rollator with unsteady gait.  Patient denies any chest pain, fever, cough, SOB, denies any resp distress. [facility] is with [facility] [place]. Abdomen slightly distended with + ascites, No more paracentesis as per daughter as pt is in hospice now. Jd Legs no edema noted at this time. During Visit Patient sitting in W/C, very sleepy, pulse OX 88% on RA, Requested for family to put patient back to bed. Oxygen 3LNC in use once patient was in bed. Oxygen went to 93%3ln than to 95%. Patient daughter educate on how to turn oxygen on and apply on patient nose, daughter verbalized understanding. Daughter is requesting for RN form [facility] to come back to do dressing change. As daughter [facility] told her , They cannot do [facility] with hospice, She have to call back regarding [facility] for dressing change at least 2x a week. will follow up with daughter next week.   Subjective: -Appetite/weight: Appetite Fair, Weight stable -Gait/falls: No Falls, Gait unsteady -Pain: No Pain -Sleep:  sleep well, naps in daytime -Urine: No issues GI: BM 3x a week  -Mood/memory: Mood Fair with period anxiety, Memory poor -Communication: Conversational -Health Maintenance:  Covid x3, Flu 10/2023, Declined covid booster, No Shingles 10hours 5 days a week -Hospitalization- [facility] November 15- December 4 Discharge         Medical Issues: -Orthostatic Hypotension - On Midodrine and Droxidopa -HLD-On Atorvastatin, aspirin -Cirrhosis with Ascites- On Lasix Cataract- On Latanoprost  Specialists: Dr Aziza Zapata [facility]    Social hx: Caregivers: have AIDE  MOLST: DNI/DNR, no feeding tube, limited medical intervention, send to hospital. Completed 11/27/2023. HCP: Daughter Viji Nieto [phone] No PCP  Cardiology Dr Obando  last visit  Prescribe Droxidopa 100mg and is authorize at this time.  WOUND DESCRIPTION: -Left forearm two large skin tears, 6x4cm and 8x3cm,  -R elbow 4x4cm skin tear -Right [id number]  WOUND SUPPLIES: -100ml bottle sterile saline, 30 per month -[id number] nonstick pad, 90 per month -Kerlix roll, 60 per month -1 inch medical tape, 1 per month  WOUND INSTRUCTIONS: -Undress & wash wounds with sterile saline -Apply mupirocin ointment to wound bases -Cover with nonstick pads -Secure with Kerlix & medical tape Left Forearm large skin tear steri strips apply to see if wound would close. -3 visits weekly, pt does not have family in home who can be taught to change wounds. All wound dressing changes today.

## 2024-04-12 PROBLEM — R41.82 ALTERED MENTAL STATUS: Status: ACTIVE | Noted: 2024-01-01

## 2024-05-07 ENCOUNTER — LABORATORY RESULT (OUTPATIENT)
Age: 84
End: 2024-05-07

## 2025-03-02 NOTE — CONSULT NOTE ADULT - SUBJECTIVE AND OBJECTIVE BOX
Neurology Consult    Reason for Consult: Patient is a 82y old  Male who presents with a chief complaint of     HPI:  81yo M with HTN, HLD comes to ED w/ slurred speech, difficulty swallowing,  worsening over the past month.   overall sx started one month ago and pt was seen by neuro Dr. Navya Roblero..MRI per daughter showed "multiple strokes "   he was seen and worked up by cardio inclduing echo and 24 hr holter but jarquin "was negative "    Denies chest pain, SOB, fall, trauma, n/v/d, sick contacts, urinary symptoms.  reports cough to solids   has difficulty swallowing but Patient denies hx of CHF however takes lasix regularly for peripheral edema?  (01 Nov 2022 17:13)       PAST MEDICAL & SURGICAL HISTORY:  H/O orthostatic hypotension      CVA (cerebrovascular accident)      History of hip surgery          Allergies: Allergies    No Known Allergies    Intolerances        Social History: Denies toxic habits including tobacco, ETOH or illicit drugs.    Family History: FAMILY HISTORY:  No pertinent family history in first degree relatives    . No family history of strokes    Medications: MEDICATIONS  (STANDING):  aspirin enteric coated 81 milliGRAM(s) Oral daily  atorvastatin 40 milliGRAM(s) Oral at bedtime  chlorhexidine 2% Cloths 1 Application(s) Topical <User Schedule>  dorzolamide 2% Ophthalmic Solution 1 Drop(s) Both EYES three times a day  fludroCORTISONE 0.1 milliGRAM(s) Oral daily  furosemide   Injectable 40 milliGRAM(s) IV Push daily  heparin   Injectable 5000 Unit(s) SubCutaneous every 8 hours  latanoprost 0.005% Ophthalmic Solution 1 Drop(s) Both EYES at bedtime  midodrine. 5 milliGRAM(s) Oral three times a day    MEDICATIONS  (PRN):      Review of Systems:  CONSTITUTIONAL:  No weight loss, fever, chills, weakness or fatigue.  HEENT:  Eyes:  No visual loss, blurred vision, double vision or yellow sclera. Ears, Nose, Throat:  No hearing loss, sneezing, congestion, runny nose or sore throat.  SKIN:  No rash or itching.  CARDIOVASCULAR:  No chest pain, chest pressure or chest discomfort. No palpitations or edema.  RESPIRATORY:  No shortness of breath, cough or sputum.  GASTROINTESTINAL:  No anorexia, nausea, vomiting or diarrhea. No abdominal pain or blood.  GENITOURINARY:  No burning on urination or incontinence   NEUROLOGICAL:  No headache, dizziness, syncope, paralysis, ataxia, numbness or tingling in the extremities. No change in bowel or bladder control. no limb weakness. no vision changes.   MUSCULOSKELETAL:  No muscle, back pain, joint pain or stiffness.  HEMATOLOGIC:  No anemia, bleeding or bruising.  LYMPHATICS:  No enlarged nodes. No history of splenectomy.  PSYCHIATRIC:  No history of depression or anxiety.  ENDOCRINOLOGIC:  No reports of sweating, cold or heat intolerance. No polyuria or polydipsia.      Vitals:  Vital Signs Last 24 Hrs  T(C): 36.6 (02 Nov 2022 11:13), Max: 36.9 (01 Nov 2022 21:31)  T(F): 97.9 (02 Nov 2022 11:13), Max: 98.4 (01 Nov 2022 21:31)  HR: 75 (02 Nov 2022 11:13) (62 - 75)  BP: 132/73 (02 Nov 2022 18:11) (125/68 - 190/81)  BP(mean): --  RR: 18 (02 Nov 2022 11:13) (16 - 18)  SpO2: 94% (02 Nov 2022 11:13) (94% - 97%)    Parameters below as of 02 Nov 2022 11:13  Patient On (Oxygen Delivery Method): room air  Orthostatic VS    11-02-22 @ 11:13  Lying BP: 169/78 HR: 75   Sitting BP: 151/80 HR: 71  Standing BP: 112/70 HR: 75  Site: upper right arm   Mode: electronic        General Exam:   General Appearance: Appropriately dressed and in no acute distress       Head: Normocephalic, atraumatic and no dysmorphic features  Ear, Nose, and Throat: Moist mucous membranes  CVS: S1S2+  Resp: No SOB, no wheeze or rhonchi  GI: soft NT/ND  Extremities: No edema or cyanosis  Skin: No bruises or rashes     Neurological Exam:  Mental Status: Awake, alert and oriented x 3.  Able to follow simple and complex verbal commands. Able to name and repeat. fluent speech. No obvious aphasia or dysarthria noted.   Cranial Nerves: PERRL, EOMI, VFFC, sensation V1-V3 intact,  no obvious facial asymmetry, equal elevation of palate, scm/trap 5/5, tongue is midline on protrusion. no obvious papilledema on fundoscopic exam. hearing is grossly intact.   Motor: Normal bulk, tone and strength throughout. Fine finger movements were intact and symmetric. no tremors or drift noted.    Sensation: Intact to light touch and pinprick throughout. no right/left confusion. no extinction to tactile on DSS. Romberg was negative.   Reflexes: 1+ throughout at biceps, brachioradialis, triceps, patellars and ankles bilaterally and equal. No clonus. R toe and L toe were both downgoing.  Coordination: No dysmetria on FNF or HKS  Gait: Narrow based and steady. Able to tandem. no limitations in gait.     Data/Labs/Imaging which I personally reviewed.     Labs:     CBC Full  -  ( 02 Nov 2022 06:44 )  WBC Count : 4.90 K/uL  RBC Count : 4.08 M/uL  Hemoglobin : 12.8 g/dL  Hematocrit : 38.8 %  Platelet Count - Automated : 120 K/uL  Mean Cell Volume : 95.1 fl  Mean Cell Hemoglobin : 31.4 pg  Mean Cell Hemoglobin Concentration : 33.0 gm/dL  Auto Neutrophil # : 2.75 K/uL  Auto Lymphocyte # : 1.31 K/uL  Auto Monocyte # : 0.54 K/uL  Auto Eosinophil # : 0.25 K/uL  Auto Basophil # : 0.04 K/uL  Auto Neutrophil % : 56.2 %  Auto Lymphocyte % : 26.7 %  Auto Monocyte % : 11.0 %  Auto Eosinophil % : 5.1 %  Auto Basophil % : 0.8 %    11-02    143  |  106  |  9   ----------------------------<  80  3.4<L>   |  29  |  0.95    Ca    8.3<L>      02 Nov 2022 06:44  Phos  1.9     11-02  Mg     1.9     11-02    TPro  6.4  /  Alb  2.7<L>  /  TBili  1.9<H>  /  DBili  x   /  AST  28  /  ALT  21  /  AlkPhos  88  11-02    LIVER FUNCTIONS - ( 02 Nov 2022 06:44 )  Alb: 2.7 g/dL / Pro: 6.4 g/dL / ALK PHOS: 88 U/L / ALT: 21 U/L / AST: 28 U/L / GGT: x                    No

## 2025-07-16 NOTE — CONSULT NOTE ADULT - PROBLEM SELECTOR PROBLEM 1
HISTORY and PHYSICAL  [facility]       NAME:  Jay Colon  MRN: [id number]   YOB: 1962   Date: 7/17/2025   Age: 63 y.o.  Gender: male     COMPLAINT AND PRESENT HISTORY:     Jay Colon is 63 y.o., male, presents for pre-anesthesia/admission testing for Dx:  Benign prostatic hyperplasia with incomplete bladder emptying [N40.1, R39.14]  With scheduled   CYSTOSCOPY GREENLIGHT LASER VAPORIZATION OF PROSTATE per Dr. Carreon.    Office note per Dr. Carreon on  6/12/25, italicized below  HPI  Here for bph and valiente. Has been on tamsulosin but luts are worsening. Has freq and incomplete emtpying. , no dysuria    UPDATE:   Lab Results   Component Value Date    PSA 0.74 03/06/2023     Pt complains of poor flow of urine, dribbling, frequency and a sense of incomplete evacuation of the bladder. Pt admits to urinary frequency and urgency. Pt admits to having darker urine color, no hematuria.   Pt denies any  nausea,  vomiting or diaphoresis .  Denies any urinary symptoms including,  dysuria. frequency.  Denies any hx of UTIs.    No  fever or chills.    Symptoms started 6 months ago.  Patient is on Flomax.     Patient has had previous Cystoscopy done. Last one 6/26/25.    SIGNIFICANT MEDICAL HISTORY:     AFib, Paroxysmal atrial fibrillation,  CHF, CAD-  Associated medications : Eliquis, aspirin, furosemide,    Echo (TTE) complete (PRN contrast/bubble/strain/3D) 7/11/2024    Left Ventricle: Normal left ventricular systolic function with a visually estimated EF of 60 - 65%. EF by 2D Simpsons Biplane is 61%. Left ventricle size is normal. Mildly increased wall thickness. Normal wall motion. Normal diastolic function.    Aortic Valve: Trileaflet valve. Mild regurgitation.    Image quality is adequate.    HTN, HLD-  Associated medications : Metoprolol succinate, amiodarone,  Lopid, atorvastatin  BP Readings from Last 3 Encounters:   07/17/25 102/64   06/26/25 135/77   06/12/25 120/76     Diabetes type 
(LOPID) 600 MG tablet TAKE 1 TABLET BY MOUTH DAILY 90 tablet 1    finasteride (PROSCAR) 5 MG tablet TAKE 1 TABLET BY MOUTH DAILY 90 tablet 1    donepezil (ARICEPT) 5 MG tablet TAKE 1 TABLET BY MOUTH EVERY  NIGHT 90 tablet 1    levothyroxine (SYNTHROID) 175 MCG tablet TAKE 1 TABLET BY MOUTH ONCE  DAILY 90 tablet 1    budesonide-formoterol (SYMBICORT) 160-4.5 MCG/ACT AERO USE 2 INHALATIONS BY MOUTH TWICE DAILY 30.6 g 3    tamsulosin (FLOMAX) 0.4 MG capsule Take 1 capsule by mouth in the morning and at bedtime 180 capsule 3    Continuous Glucose Sensor (DEXCOM G7 SENSOR) MISC by Does not apply route      amiodarone (CORDARONE) 200 MG tablet Take 1 tablet by mouth daily 30 tablet 0    clotrimazole-betamethasone (LOTRISONE) 1-0.05 % cream APPLY TWICE DAILY TOPICALLY 90 g 1    ELIQUIS 5 MG TABS tablet TAKE 1 TABLET BY MOUTH TWICE  DAILY 180 tablet 3    ciclopirox (LOPROX) 0.77 % cream Apply topically to feet 2 times daily. 90 g 3    traZODone (DESYREL) 150 MG tablet TAKE 1 TABLET BY MOUTH EVERY  NIGHT 90 tablet 3    atorvastatin (LIPITOR) 40 MG tablet TAKE 1 TABLET BY MOUTH ONCE  DAILY 90 tablet 3    rOPINIRole (REQUIP) 2 MG tablet TAKE 1 TABLET BY MOUTH ONCE  DAILY 90 tablet 3    insulin lispro, 1 Unit Dial, (HUMALOG/ADMELOG) 100 UNIT/ML SOPN INJECT SUBCUTANEOUSLY 10 UNITS 3 TIMES DAILY BEFORE MEALS 30 mL 3    metoprolol succinate (TOPROL XL) 25 MG extended release tablet Take 1 tablet by mouth daily 90 tablet 3    furosemide (LASIX) 20 MG tablet TAKE 1 TABLET BY MOUTH ONCE  DAILY 90 tablet 3    vitamin D (ERGOCALCIFEROL) 1.25 MG (75095 UT) CAPS capsule TAKE 1 CAPSULE BY MOUTH ONCE  WEEKLY 13 capsule 3    omeprazole (PRILOSEC) 20 MG delayed release capsule TAKE 1 CAPSULE BY MOUTH DAILY 90 capsule 3    pregabalin (LYRICA) 100 MG capsule Take 1 capsule by mouth in the morning, at noon, and at bedtime for 90 days. 270 capsule 3    B-D UF III MINI PEN NEEDLES 31G X 5 MM MISC USE AS DIRECTED ONCE DAILY  TO INJECT TOUJEO 90 
Dysphagia
Generalized weakness